# Patient Record
Sex: MALE | Race: WHITE | NOT HISPANIC OR LATINO | Employment: FULL TIME | URBAN - METROPOLITAN AREA
[De-identification: names, ages, dates, MRNs, and addresses within clinical notes are randomized per-mention and may not be internally consistent; named-entity substitution may affect disease eponyms.]

---

## 2017-01-24 ENCOUNTER — OFFICE VISIT (OUTPATIENT)
Dept: ENDOCRINOLOGY | Facility: CLINIC | Age: 57
End: 2017-01-24

## 2017-01-24 VITALS
WEIGHT: 267 LBS | HEIGHT: 72 IN | SYSTOLIC BLOOD PRESSURE: 138 MMHG | DIASTOLIC BLOOD PRESSURE: 90 MMHG | BODY MASS INDEX: 36.16 KG/M2

## 2017-01-24 DIAGNOSIS — E55.9 VITAMIN D DEFICIENCY: ICD-10-CM

## 2017-01-24 DIAGNOSIS — R73.9 HYPERGLYCEMIA: Primary | ICD-10-CM

## 2017-01-24 DIAGNOSIS — E78.2 MIXED HYPERLIPIDEMIA: ICD-10-CM

## 2017-01-24 DIAGNOSIS — I10 ESSENTIAL HYPERTENSION: ICD-10-CM

## 2017-01-24 DIAGNOSIS — M54.41 ACUTE MIDLINE LOW BACK PAIN WITH RIGHT-SIDED SCIATICA: ICD-10-CM

## 2017-01-24 DIAGNOSIS — R07.2 PRECORDIAL PAIN: ICD-10-CM

## 2017-01-24 LAB
ALBUMIN SERPL-MCNC: 4.6 G/DL (ref 3.2–4.8)
ALBUMIN/GLOB SERPL: 1.6 G/DL (ref 1.5–2.5)
ALP SERPL-CCNC: 54 U/L (ref 25–100)
ALT SERPL W P-5'-P-CCNC: 39 U/L (ref 7–40)
ANION GAP SERPL CALCULATED.3IONS-SCNC: 13 MMOL/L (ref 3–11)
ARTICHOKE IGE QN: 110 MG/DL (ref 0–130)
AST SERPL-CCNC: 34 U/L (ref 0–33)
BILIRUB SERPL-MCNC: 0.6 MG/DL (ref 0.3–1.2)
BUN BLD-MCNC: 15 MG/DL (ref 9–23)
BUN/CREAT SERPL: 18.8 (ref 7–25)
CALCIUM SPEC-SCNC: 10.3 MG/DL (ref 8.7–10.4)
CHLORIDE SERPL-SCNC: 103 MMOL/L (ref 99–109)
CHOLEST SERPL-MCNC: 193 MG/DL (ref 0–200)
CO2 SERPL-SCNC: 27 MMOL/L (ref 20–31)
CREAT BLD-MCNC: 0.8 MG/DL (ref 0.6–1.3)
GFR SERPL CREATININE-BSD FRML MDRD: 101 ML/MIN/1.73
GLOBULIN UR ELPH-MCNC: 2.9 GM/DL
GLUCOSE BLD-MCNC: 123 MG/DL (ref 70–100)
HBA1C MFR BLD: 6.4 %
HDLC SERPL-MCNC: 44 MG/DL (ref 40–60)
POTASSIUM BLD-SCNC: 4.4 MMOL/L (ref 3.5–5.5)
PROT SERPL-MCNC: 7.5 G/DL (ref 5.7–8.2)
SODIUM BLD-SCNC: 143 MMOL/L (ref 132–146)
T4 FREE SERPL-MCNC: 1.18 NG/DL (ref 0.89–1.76)
TRIGL SERPL-MCNC: 233 MG/DL (ref 0–150)
TSH SERPL DL<=0.05 MIU/L-ACNC: 2.07 MIU/ML (ref 0.35–5.35)

## 2017-01-24 PROCEDURE — 80053 COMPREHEN METABOLIC PANEL: CPT | Performed by: INTERNAL MEDICINE

## 2017-01-24 PROCEDURE — 84443 ASSAY THYROID STIM HORMONE: CPT | Performed by: INTERNAL MEDICINE

## 2017-01-24 PROCEDURE — 83036 HEMOGLOBIN GLYCOSYLATED A1C: CPT | Performed by: INTERNAL MEDICINE

## 2017-01-24 PROCEDURE — 80061 LIPID PANEL: CPT | Performed by: INTERNAL MEDICINE

## 2017-01-24 PROCEDURE — 99214 OFFICE O/P EST MOD 30 MIN: CPT | Performed by: INTERNAL MEDICINE

## 2017-01-24 PROCEDURE — 84439 ASSAY OF FREE THYROXINE: CPT | Performed by: INTERNAL MEDICINE

## 2017-01-24 RX ORDER — CARVEDILOL 12.5 MG/1
12.5 TABLET ORAL 2 TIMES DAILY WITH MEALS
Qty: 180 TABLET | Refills: 1 | Status: SHIPPED | OUTPATIENT
Start: 2017-01-24 | End: 2017-07-23 | Stop reason: SDUPTHER

## 2017-01-24 RX ORDER — ROSUVASTATIN CALCIUM 10 MG/1
1 TABLET, COATED ORAL DAILY
COMMUNITY
Start: 2016-11-25 | End: 2017-03-06 | Stop reason: SDUPTHER

## 2017-01-24 NOTE — MR AVS SNAPSHOT
Buzz Elmore   1/24/2017 10:30 AM   Office Visit    Dept Phone:  [phone]   Encounter #:  [id number]    Provider:  Maida Rey MD   Department:  [facility] INTERNAL MEDICINE AND ENDOCRINOLOGY                Your Full Care Plan              Today's Medication Changes          These changes are accurate as of: 1/24/17 10:53 AM.  If you have any questions, ask your nurse or doctor.               Medication(s)that have changed:     carvedilol 12.5 MG tablet   Commonly known as:  COREG   Take 1 tablet by mouth 2 (Two) Times a Day With Meals.   What changed:    - medication strength  - how much to take       rosuvastatin 10 MG tablet   Commonly known as:  CRESTOR   Take 1 tablet by mouth Daily.   What changed:  Another medication with the same name was removed. Continue taking this medication, and follow the directions you see here.            Where to Get Your Medications      [facility] [address] [facility]  [address] [facility] [phone] [facility] [phone] [place] [address]     Phone:  [phone]     carvedilol 12.5 MG tablet                  Your Updated Medication List          This list is accurate as of: 1/24/17 10:53 AM.  Always use your most recent med list.                ASPIRIN LOW DOSE 81 MG tablet   Generic drug:  aspirin       carvedilol 12.5 MG tablet   Commonly known as:  COREG   Take 1 tablet by mouth 2 (Two) Times a Day With Meals.       fenofibrate 48 MG tablet   Commonly known as:  TRICOR       losartan-hydrochlorothiazide 100-25 MG per tablet   Commonly known as:  HYZAAR   Take 1 tablet by mouth daily.       pantoprazole 40 MG EC tablet   Commonly known as:  PROTONIX   Take 1 tablet by mouth daily.       prasugrel 10 MG tablet   Commonly known as:  EFFIENT       predniSONE 10 MG tablet   Commonly known as:  DELTASONE       raNITIdine 75 MG tablet    Commonly known as:  ZANTAC       rosuvastatin 10 MG tablet   Commonly known as:  CRESTOR       Vitamin D 2000 UNITS capsule               We Performed the Following     Comprehensive Metabolic Panel     Lipid Panel     POC Glycosylated Hemoglobin (Hb A1C)     T4, Free     TSH       You Were Diagnosed With        Codes Comments    Hyperglycemia    -  Primary ICD-10-CM: R73.9  ICD-9-CM: 790.29     Mixed hyperlipidemia     ICD-10-CM: E78.2  ICD-9-CM: 272.2     Essential hypertension     ICD-10-CM: I10  ICD-9-CM: 401.9     Vitamin D deficiency     ICD-10-CM: E55.9  ICD-9-CM: 268.9     Acute midline low back pain with right-sided sciatica     ICD-10-CM: M54.41  ICD-9-CM: 724.2, 724.3     Precordial pain     ICD-10-CM: R07.2  ICD-9-CM: 786.51       Instructions     None    Patient Instructions History      Upcoming Appointments     Visit Type Date Time Department    OFFICE VISIT 2017 10:30 AM [facility]    OFFICE VISIT 2017 10:30 AM [facility]    FOLLOW UP 2017  9:15 AM [facility]      VCE Signup     [employer] allows you to send messages to your doctor, view your test results, renew your prescriptions, schedule appointments, and more. To sign up, go to [employer] and click on the Sign Up Now link in the New User? box. Enter your VCE Activation Code exactly as it appears below along with the last four digits of your Social Security Number and your Date of Birth () to complete the sign-up process. If you do not sign up before the expiration date, you must request a new code.    VCE Activation Code: [email or web address]  Expires: 2017 10:53 AM    If you have questions, you can email [email or web address] or call [phone] to talk to our VCE staff. Remember, VCE is NOT to be used for urgent needs. For medical emergencies, dial 911.               Other Info from Your Visit           Your Appointments     May 25, 2017 10:30 AM EDT  "  Office Visit with Maida Rey MD   [facility] INTERNAL MEDICINE AND ENDOCRINOLOGY (--)    [address]  [place] [address] [phone]           Arrive 15 minutes prior to appointment.            Sep 14, 2017  9:15 AM EDT   Follow Up with Db Kitchen MD   [facility] CARDIOLOGY (--)    [address] [place] [address] [phone]           Arrive 15 minutes prior to appointment.              Allergies     Atorvastatin  Myalgia      Reason for Visit     Follow-up Hypertension, hyperlipidemia, hyperglycemia, stress test      Vital Signs     Blood Pressure Height Weight Body Mass Index Smoking Status       138/90 72\" (182.9 cm) 267 lb (121 kg) 36.21 kg/m2 Never Smoker       Problems and Diagnoses Noted     Chest pain    Hyperglycemia    High cholesterol or triglycerides    High blood pressure    Low back pain    Vitamin D deficiency      Results     POC Glycosylated Hemoglobin (Hb A1C)      Component Value Standard Range & Units    Hemoglobin A1C 6.4 %                    "

## 2017-01-24 NOTE — PROGRESS NOTES
Buzz Elmore 56 y.o.  [place]: Follow-up (Hypertension, hyperlipidemia, hyperglycemia, stress test)    [place]: Follow-up (Hypertension, hyperlipidemia, hyperglycemia, stress test)    bp is high today   Has had headache for last 2 days  Weight gain 10 lbs over last 2 months  Is off work now and has had increased lower back pain - worse with walking   Radiates down right hip   Discussed PT - he will start with chiropracter and more walking   He will let us know if he wants PT order  Blood sugar and 90 day average sugar reviewed  Results for orders placed or performed in visit on 01/24/17   POC Glycosylated Hemoglobin (Hb A1C)   Result Value Ref Range    Hemoglobin A1C 6.4 %     Has increased significantly   Discussed goals with diet, need for him to avoid processed carbs  Whole food diet encouraged, with increased activity and goal of improved fitness, weight loss     Allergies   Allergen Reactions   • Atorvastatin Myalgia       Current Outpatient Prescriptions:   •  aspirin (ASPIRIN LOW DOSE) 81 MG tablet, Take  by mouth daily., Disp: , Rfl:   •  carvedilol (COREG) 6.25 MG tablet, Take 1 tablet by mouth 2 (two) times a day with meals., Disp: 180 tablet, Rfl: 3  •  Cholecalciferol (VITAMIN D) 2000 UNITS capsule, Take  by mouth., Disp: , Rfl:   •  fenofibrate (TRICOR) 48 MG tablet, Take 48 mg by mouth Daily., Disp: , Rfl:   •  losartan-hydrochlorothiazide (HYZAAR) 100-25 MG per tablet, Take 1 tablet by mouth daily., Disp: 90 tablet, Rfl: 3  •  pantoprazole (PROTONIX) 40 MG EC tablet, Take 1 tablet by mouth daily., Disp: 30 tablet, Rfl: 5  •  prasugrel (EFFIENT) 10 MG tablet, Take 10 mg by mouth Daily., Disp: , Rfl:   •  predniSONE (DELTASONE) 10 MG tablet, Take  by mouth., Disp: , Rfl:   •  ranitidine (ZANTAC) 75 MG tablet, Take 1 tablet by mouth 2 (two) times a day., Disp: , Rfl:   •  rosuvastatin (CRESTOR) 10 MG tablet, Take 1 tablet by mouth Daily., Disp: , Rfl:   Patient Active Problem List    Diagnosis   • IHD  (ischemic heart disease) [I25.9]     Overview Note:     1. Ischemic heart disease:  a. [facility] for ACS, 07/28/2011:  PTCA/PCI to proximal RCA  LVEF 60%;    b. [facility] Cardiolite stress test, 11/22/2011.  i. Patient walked 10:00 m/s to Stage IV of Brian protocol.  ii. Achieved 12.8 METS.  iii. Negative for ischemia or scar.  iv. LVEF 76%.       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.698A]   • Atherosclerosis of coronary artery [I25.10]     Overview Note:     Impression: 06/27/2014 - sees Dr Mcgraw this fall; Description: spenser 8/13     • Hyperglycemia [R73.9]     Overview Note:     Impression: 01/28/2016 - check hgn a1c;      • Gastroesophageal reflux disease [K21.9]   • Hyperlipidemia [E78.5]     Overview Note:     Impression: 01/28/2016 - check flp  Impression: 06/08/2015 - check flp  Impression: 12/08/2014 - check flp- is back on crestor without problems  Impression: 08/21/2014 - known cad- on and off statin therapy  will need rx at some point  Impression: 06/27/2014 - check flp;      • Hypertension [I10]     Overview Note:     Impression: 01/28/2016 - bp is good now, in cardio appt   continue to monitor  Impression: 06/08/2015 - bp is good  Impression: 12/08/2014 - bp is good  Impression: 06/27/2014 - bp is higher- continue current medications and add norvasc 5 mg daily to current regimen;      • Knee pain [M25.569]     Overview Note:     Impression: 12/08/2014 - better overall, continue to monitor  Impression: 06/27/2014 - getting supartz injections; Description: Emanuel 2/14 MRI  s/p joint injection- kenalog 2/14, synvisc 5/14  Emanuel- meniscus tear post op- doing well     • Lipoma [D17.9]   • Low back pain [M54.5]     Overview Note:     Impression: 08/21/2014 - stable to improving;      • Calculus of kidney [N20.0]   • Muscle pain [M79.1]     Overview Note:     Impression: 06/08/2015 - pain gone postop knee  Impression: 12/08/2014 - update - no muscle pain currently but does have joint pain  Impression: 08/21/2014 - update cmp and ck  on high dose atorvastatin and fenofibrate  d/c atorvastatin for now  has ov 12/14- can hold until then, then try cresor alone  f/u as scheduled;      • Vitamin D deficiency [E55.9]     Overview Note:     Impression: 06/08/2015 - update levels  Impression: 12/08/2014 - update levels;        Review of Systems   Constitutional: Positive for unexpected weight change. Negative for activity change, appetite change, chills, diaphoresis, fatigue and fever.   HENT: Negative for congestion, dental problem, drooling, ear discharge, ear pain, facial swelling, hearing loss, mouth sores, nosebleeds, postnasal drip, rhinorrhea, sinus pressure, sneezing, sore throat, tinnitus, trouble swallowing and voice change.    Eyes: Negative for photophobia, pain, discharge, redness, itching and visual disturbance.   Respiratory: Negative for apnea, cough, choking, chest tightness, shortness of breath, wheezing and stridor.    Cardiovascular: Negative for chest pain, palpitations and leg swelling.        Higher bp with recent normal stress test    Gastrointestinal: Negative for abdominal distention, abdominal pain, anal bleeding, blood in stool, constipation, diarrhea, nausea, rectal pain and vomiting.   Endocrine: Negative for cold intolerance, heat intolerance, polydipsia, polyphagia and polyuria.   Genitourinary: Negative for decreased urine volume, difficulty urinating, dysuria, enuresis, flank pain, frequency, genital sores, hematuria and urgency.   Musculoskeletal: Positive for back pain. Negative for arthralgias, gait problem,  "joint swelling, myalgias, neck pain and neck stiffness.   Skin: Negative for color change, pallor, rash and wound.   Allergic/Immunologic: Negative for environmental allergies, food allergies and immunocompromised state.   Neurological: Negative for dizziness, tremors, seizures, syncope, facial asymmetry, speech difficulty, weakness, light-headedness, numbness and headaches.   Hematological: Negative for adenopathy. Does not bruise/bleed easily.   Psychiatric/Behavioral: Negative for agitation, behavioral problems, confusion, decreased concentration, dysphoric mood, hallucinations, self-injury, sleep disturbance and suicidal ideas. The patient is not nervous/anxious and is not hyperactive.      Social History     Social History   • Marital status:      Spouse name: N/A   • Number of children: N/A   • Years of education: N/A     Occupational History   • Not on file.     Social History Main Topics   • Smoking status: Never Smoker   • Smokeless tobacco: Never Used   • Alcohol use No   • Drug use: No   • Sexual activity: Defer     Other Topics Concern   • Not on file     Social History Narrative     Family History   Problem Relation Age of Onset   • Breast cancer Other    • Colon cancer Other    • Heart disease Other    • Breast cancer Mother    • Heart disease Mother    • Colon cancer Father    • Heart disease Paternal Grandfather      Visit Vitals   • /90   • Ht 72\" (182.9 cm)   • Wt 267 lb (121 kg)   • BMI 36.21 kg/m2     Physical Exam   Constitutional: He is oriented to person, place, and time. He appears well-developed and well-nourished.   HENT:   Head: Normocephalic and atraumatic.   Nose: Nose normal.   Mouth/Throat: Oropharynx is clear and moist.   Eyes: Conjunctivae, EOM and lids are normal. Pupils are equal, round, and reactive to light.   Neck: Trachea normal and normal range of motion. Neck supple. Carotid bruit is not present. No tracheal deviation present. No thyroid mass and no thyromegaly " present.   Cardiovascular: Normal rate, regular rhythm, normal heart sounds and intact distal pulses.  Exam reveals no gallop and no friction rub.    No murmur heard.  Pulmonary/Chest: Effort normal and breath sounds normal. No respiratory distress. He has no wheezes.   Musculoskeletal: Normal range of motion. He exhibits no edema or deformity.   Lymphadenopathy:     He has no cervical adenopathy.   Neurological: He is alert and oriented to person, place, and time. He has normal reflexes. He displays normal reflexes. No cranial nerve deficit.   Skin: Skin is warm and dry. No rash noted. No cyanosis or erythema. Nails show no clubbing.   Psychiatric: He has a normal mood and affect. His speech is normal and behavior is normal. Judgment and thought content normal. Cognition and memory are normal.   Nursing note and vitals reviewed.    Results for orders placed or performed during the hospital encounter of 11/30/16   Stress test with myocardial perfusion   Result Value Ref Range    BH CV STRESS PROTOCOL 1 Brian     Stage 1 1     Duration Min Stage 1 3     Duration Sec Stage 1 0     Grade Stage 1 10     Speed Stage 1 1.7     BH CV STRESS METS STAGE 1 5     Baseline HR 66 bpm    Baseline /90 mmHg    Target HR (85%) 141 bpm    Max. Pred. HR (100%) 166 bpm    HR Stage 1 101     BP Stage 1 160/94     Stage 2 2     HR Stage 2 118     BP Stage 2 166/98     Duration Min Stage 2 3     Duration Sec Stage 2 0     Grade Stage 2 12     Speed Stage 2 2.5     BH CV STRESS METS STAGE 2 7.5     Stage 3 3     HR Stage 3 137     BP Stage 3 180/100     Duration Min Stage 3 3     Duration Sec Stage 3 0     Grade Stage 3 14     Speed Stage 3 3.4     BH CV STRESS METS STAGE 3 10.0     Stage 4 4     HR Stage 4 146     BP Stage 4 200/60     Duration Min Stage 4 1     Duration Sec Stage 4 20     Grade Stage 4 16     Speed Stage 4 4.2     BH CV STRESS METS STAGE 4 13.5     Peak /60 mmHg    Exercise duration (min) 10 min    Exercise  duration (sec) 20 sec    Estimated workload 10.8 METS    Peak  bpm    Percent Max Pred HR 87.95 %    Percent Target  %    Recovery  bpm    Recovery /80 mmHg    Nuc Stress EF 71 %     Problem List Items Addressed This Visit        Cardiovascular and Mediastinum    Hyperlipidemia     Is on statin therapy, known cad  Check flp and ast          Relevant Medications    rosuvastatin (CRESTOR) 10 MG tablet    Other Relevant Orders    TSH    T4, Free    Lipid Panel    Hypertension     High bp - increase coreg to 12.5 mg twice daily   Check bp at home and call if over 145/85            Relevant Medications    carvedilol (COREG) 12.5 MG tablet       Digestive    Vitamin D deficiency     On supplement  Check levels yearly             Nervous and Auditory    Chest pain     Recent neg stress test Dr Kitchen  Is 6 years out from stent          Low back pain     Due to inactivity and weight gain  Discussed cleaning up diet, work on weight loss  Discussed increase walking/ physical activity  Offered referral to PT but he would prefer to try chiropracter and above measures  F/u 3-4 month s            Other    Hyperglycemia - Primary     Blood sugar and 90 day average sugar discussed with him  Results for orders placed or performed in visit on 01/24/17   POC Glycosylated Hemoglobin (Hb A1C)   Result Value Ref Range    Hemoglobin A1C 6.4 %     Is near diabetic   See above plan for increased activity and weight loss  If not consider adding metformin          Relevant Orders    POC Glycosylated Hemoglobin (Hb A1C) (Completed)    Comprehensive Metabolic Panel        Return in about 4 months (around 5/24/2017) for Recheck 30 min .      Blanca Chavez MA

## 2017-03-07 RX ORDER — ROSUVASTATIN CALCIUM 10 MG/1
TABLET, COATED ORAL
Qty: 90 TABLET | Refills: 4 | Status: SHIPPED | OUTPATIENT
Start: 2017-03-07 | End: 2018-03-09 | Stop reason: SDUPTHER

## 2017-03-20 RX ORDER — PANTOPRAZOLE SODIUM 40 MG/1
TABLET, DELAYED RELEASE ORAL
Qty: 30 TABLET | Refills: 5 | Status: SHIPPED | OUTPATIENT
Start: 2017-03-20 | End: 2017-05-17 | Stop reason: SDUPTHER

## 2017-03-21 RX ORDER — FENOFIBRATE 48 MG/1
TABLET, COATED ORAL
Qty: 30 TABLET | Refills: 5 | Status: SHIPPED | OUTPATIENT
Start: 2017-03-21 | End: 2017-09-14 | Stop reason: SDUPTHER

## 2017-05-19 RX ORDER — PANTOPRAZOLE SODIUM 40 MG/1
TABLET, DELAYED RELEASE ORAL
Qty: 30 TABLET | Refills: 5 | Status: SHIPPED | OUTPATIENT
Start: 2017-05-19 | End: 2017-10-30 | Stop reason: SDUPTHER

## 2017-07-24 RX ORDER — CARVEDILOL 12.5 MG/1
TABLET ORAL
Qty: 180 TABLET | Refills: 0 | Status: SHIPPED | OUTPATIENT
Start: 2017-07-24 | End: 2017-10-19 | Stop reason: SDUPTHER

## 2017-09-14 RX ORDER — FENOFIBRATE 48 MG/1
TABLET, COATED ORAL
Qty: 30 TABLET | Refills: 6 | Status: SHIPPED | OUTPATIENT
Start: 2017-09-14 | End: 2018-04-16 | Stop reason: SDUPTHER

## 2017-10-04 RX ORDER — PRASUGREL HCL 10 MG
TABLET ORAL
Qty: 30 TABLET | Refills: 0 | Status: SHIPPED | OUTPATIENT
Start: 2017-10-04 | End: 2017-11-02 | Stop reason: SDUPTHER

## 2017-10-19 RX ORDER — CARVEDILOL 12.5 MG/1
TABLET ORAL
Qty: 30 TABLET | Refills: 0 | Status: SHIPPED | OUTPATIENT
Start: 2017-10-19 | End: 2017-11-04 | Stop reason: SDUPTHER

## 2017-10-30 ENCOUNTER — OFFICE VISIT (OUTPATIENT)
Dept: ENDOCRINOLOGY | Facility: CLINIC | Age: 57
End: 2017-10-30

## 2017-10-30 ENCOUNTER — OFFICE VISIT (OUTPATIENT)
Dept: CARDIOLOGY | Facility: CLINIC | Age: 57
End: 2017-10-30

## 2017-10-30 VITALS
OXYGEN SATURATION: 98 % | SYSTOLIC BLOOD PRESSURE: 144 MMHG | BODY MASS INDEX: 34.43 KG/M2 | DIASTOLIC BLOOD PRESSURE: 96 MMHG | HEIGHT: 73 IN | WEIGHT: 259.8 LBS | HEART RATE: 63 BPM

## 2017-10-30 VITALS
HEART RATE: 64 BPM | DIASTOLIC BLOOD PRESSURE: 72 MMHG | OXYGEN SATURATION: 98 % | BODY MASS INDEX: 34.59 KG/M2 | SYSTOLIC BLOOD PRESSURE: 124 MMHG | WEIGHT: 261 LBS | HEIGHT: 73 IN

## 2017-10-30 DIAGNOSIS — I10 ESSENTIAL HYPERTENSION: ICD-10-CM

## 2017-10-30 DIAGNOSIS — I25.118 ATHEROSCLEROSIS OF NATIVE CORONARY ARTERY OF NATIVE HEART WITH OTHER FORM OF ANGINA PECTORIS (HCC): ICD-10-CM

## 2017-10-30 DIAGNOSIS — E55.9 VITAMIN D DEFICIENCY: ICD-10-CM

## 2017-10-30 DIAGNOSIS — I25.9 IHD (ISCHEMIC HEART DISEASE): Primary | ICD-10-CM

## 2017-10-30 DIAGNOSIS — E78.2 MIXED HYPERLIPIDEMIA: ICD-10-CM

## 2017-10-30 DIAGNOSIS — R73.9 HYPERGLYCEMIA: Primary | ICD-10-CM

## 2017-10-30 LAB
25(OH)D3 SERPL-MCNC: 27.4 NG/ML
ALBUMIN SERPL-MCNC: 4.5 G/DL (ref 3.2–4.8)
ALBUMIN/GLOB SERPL: 1.7 G/DL (ref 1.5–2.5)
ALP SERPL-CCNC: 52 U/L (ref 25–100)
ALT SERPL W P-5'-P-CCNC: 44 U/L (ref 7–40)
ANION GAP SERPL CALCULATED.3IONS-SCNC: 6 MMOL/L (ref 3–11)
ARTICHOKE IGE QN: 109 MG/DL (ref 0–130)
AST SERPL-CCNC: 36 U/L (ref 0–33)
BILIRUB SERPL-MCNC: 0.8 MG/DL (ref 0.3–1.2)
BUN BLD-MCNC: 17 MG/DL (ref 9–23)
BUN/CREAT SERPL: 15.5 (ref 7–25)
CALCIUM SPEC-SCNC: 9.5 MG/DL (ref 8.7–10.4)
CHLORIDE SERPL-SCNC: 102 MMOL/L (ref 99–109)
CHOLEST SERPL-MCNC: 164 MG/DL (ref 0–200)
CO2 SERPL-SCNC: 28 MMOL/L (ref 20–31)
CREAT BLD-MCNC: 1.1 MG/DL (ref 0.6–1.3)
GFR SERPL CREATININE-BSD FRML MDRD: 69 ML/MIN/1.73
GLOBULIN UR ELPH-MCNC: 2.7 GM/DL
GLUCOSE BLD-MCNC: 86 MG/DL (ref 70–100)
GLUCOSE BLDC GLUCOMTR-MCNC: 80 MG/DL (ref 70–130)
HBA1C MFR BLD: 6.1 %
HDLC SERPL-MCNC: 44 MG/DL (ref 40–60)
POTASSIUM BLD-SCNC: 4.1 MMOL/L (ref 3.5–5.5)
PROT SERPL-MCNC: 7.2 G/DL (ref 5.7–8.2)
SODIUM BLD-SCNC: 136 MMOL/L (ref 132–146)
T4 FREE SERPL-MCNC: 1.24 NG/DL (ref 0.89–1.76)
TRIGL SERPL-MCNC: 167 MG/DL (ref 0–150)
TSH SERPL DL<=0.05 MIU/L-ACNC: 1.65 MIU/ML (ref 0.35–5.35)

## 2017-10-30 PROCEDURE — 82306 VITAMIN D 25 HYDROXY: CPT | Performed by: INTERNAL MEDICINE

## 2017-10-30 PROCEDURE — 84443 ASSAY THYROID STIM HORMONE: CPT | Performed by: INTERNAL MEDICINE

## 2017-10-30 PROCEDURE — 80061 LIPID PANEL: CPT | Performed by: INTERNAL MEDICINE

## 2017-10-30 PROCEDURE — 99213 OFFICE O/P EST LOW 20 MIN: CPT | Performed by: INTERNAL MEDICINE

## 2017-10-30 PROCEDURE — 83036 HEMOGLOBIN GLYCOSYLATED A1C: CPT | Performed by: INTERNAL MEDICINE

## 2017-10-30 PROCEDURE — 99214 OFFICE O/P EST MOD 30 MIN: CPT | Performed by: INTERNAL MEDICINE

## 2017-10-30 PROCEDURE — 82947 ASSAY GLUCOSE BLOOD QUANT: CPT | Performed by: INTERNAL MEDICINE

## 2017-10-30 PROCEDURE — 84439 ASSAY OF FREE THYROXINE: CPT | Performed by: INTERNAL MEDICINE

## 2017-10-30 PROCEDURE — 80053 COMPREHEN METABOLIC PANEL: CPT | Performed by: INTERNAL MEDICINE

## 2017-10-30 RX ORDER — FLUOROURACIL 50 MG/G
CREAM TOPICAL AS NEEDED
COMMUNITY
Start: 2017-08-29 | End: 2019-04-08

## 2017-10-30 RX ORDER — PANTOPRAZOLE SODIUM 40 MG/1
TABLET, DELAYED RELEASE ORAL
Qty: 90 TABLET | Refills: 3 | Status: SHIPPED | OUTPATIENT
Start: 2017-10-30 | End: 2018-11-05 | Stop reason: SDUPTHER

## 2017-10-30 RX ORDER — TRIAMCINOLONE ACETONIDE 1 MG/G
CREAM TOPICAL AS NEEDED
COMMUNITY
Start: 2017-08-29 | End: 2019-04-08

## 2017-10-30 NOTE — PROGRESS NOTES
Subjective:     Encounter Date:10/30/2017      Patient ID: Buzz Elmore is a 57 y.o. male.    Chief Complaint: ischemic heart disease and Hypertension    PROBLEM LIST:  1. Ischemic heart disease  a. [facility] for ACS, 07/28/2011: PTCA/PCI to  proximal RCA  LVEF 60%.  2. Coronary artery disease  a. Stress Test, 12/02/2016: EF > 70%. No ischemia  3. Hyperlipidemia  4. Hypertension  5. Chronic low back pain  6. GERD  7. Obesity    History of Present Illness  Buzz Elmore returns today for a 12 month follow up with a history of coronary artery disease among other cardiac risk factors. Since last visit he has been doing well from a cardiovascular standpoint. He has been stable over the past year, and presents today with no new medical problems. Denies any exertional chest pain, shortness of breath, orthopnea, PND, or palpitations. 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     Allergies   Allergen Reactions   • Atorvastatin Myalgia       Current Outpatient Prescriptions:   •  aspirin (ASPIRIN LOW DOSE) 81 MG tablet, Take  by mouth daily., Disp: , Rfl:   •  carvedilol (COREG) 12.5 MG tablet, TAKE ONE TABLET BY MOUTH TWICE A DAY WITH MEALS, Disp: 30 tablet, Rfl: 0  •  Cholecalciferol (VITAMIN D) 2000 UNITS capsule, Take  by mouth Daily., Disp: , Rfl:   •  EFFIENT 10 MG tablet, TAKE ONE TABLET BY MOUTH DAILY, Disp: 30 tablet, Rfl: 0  •  fenofibrate (TRICOR) 48 MG tablet, TAKE ONE TABLET BY MOUTH DAILY, Disp: 30 tablet, Rfl: 6  •  fluorouracil (EFUDEX) 5 % cream, As Needed., Disp: , Rfl:   •  losartan-hydrochlorothiazide (HYZAAR) 100-25 MG per tablet, Take 1 tablet by mouth daily., Disp: 90 tablet, Rfl: 3  •  pantoprazole (PROTONIX) 40 MG EC tablet, Take 1 tablet by mouth once daily., Disp: 30 tablet, Rfl: 5  •  rosuvastatin (CRESTOR) 10 MG tablet, TAKE ONE TABLET BY MOUTH DAILY, Disp: 90 tablet, Rfl: 4  •  triamcinolone (KENALOG) 0.1 % cream, As Needed., Disp: , Rfl:     The following portions of the  "patient's history were reviewed and updated as appropriate: allergies, current medications, past family history, past medical history, past social history, past surgical history and problem list.    Review of Systems   Constitution: Negative.   Cardiovascular: Negative.    Respiratory: Negative.    Hematologic/Lymphatic: Negative for bleeding problem. Does not bruise/bleed easily.   Skin: Negative for rash.   Musculoskeletal: Positive for joint pain. Negative for muscle weakness and myalgias.   Gastrointestinal: Negative for heartburn, nausea and vomiting.   Neurological: Negative.         Objective:     Vitals:    10/30/17 1117   BP: 144/96   BP Location: Right arm   Patient Position: Sitting   Pulse: 63   SpO2: 98%   Weight: 259 lb 12.8 oz (118 kg)   Height: 73\" (185.4 cm)       Physical Exam   Constitutional: He is oriented to person, place, and time. He appears well-developed and well-nourished.   HENT:   Mouth/Throat: Oropharynx is clear and moist.   Neck: No JVD present. Carotid bruit is not present. No thyromegaly present.   Cardiovascular: Regular rhythm, S1 normal, S2 normal, normal heart sounds and intact distal pulses.  Exam reveals no gallop, no S3 and no S4.    No murmur heard.  Pulses:       Carotid pulses are 2+ on the right side, and 2+ on the left side.       Radial pulses are 2+ on the right side, and 2+ on the left side.   Pulmonary/Chest: Breath sounds normal.   Abdominal: Soft. Bowel sounds are normal. He exhibits no mass. There is no tenderness.   Musculoskeletal: He exhibits no edema.   Neurological: He is alert and oriented to person, place, and time.   Skin: Skin is warm and dry. No rash noted.     Lab Review:    Procedures        Assessment:   Buzz was seen today for ischemic heart disease and hypertension.    Diagnoses and all orders for this visit:    IHD (ischemic heart disease)    Mixed hyperlipidemia    Essential hypertension    Atherosclerosis of native coronary artery of native " heart with other form of angina pectoris      Impression:  1. Ischemic heart disease, stable without angina  2. Hyperlipidemia is controlled with statin therapy.  3. Hypertension is well-controlled.    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 Continue current medications.  3. Revisit in 12 MO, or sooner as needed.    Scribed for Db Kitchen MD by Jennifer Crowe. 10/30/2017  11:36 AM    I, Db Kitchen MD, personally performed the services described in this documentation as scribed by the above individual in my presence, and it is both accurate and complete      Please note that portions of this note may have been completed with a voice recognition program. Efforts were made to edit the dictations, but occasionally words are mistranscribed.

## 2017-10-30 NOTE — ASSESSMENT & PLAN NOTE
Blood sugar and 90 day average sugar reviewed   Results for orders placed or performed in visit on 10/30/17   POC Glycosylated Hemoglobin (Hb A1C)   Result Value Ref Range    Hemoglobin A1C 6.1 %   POC Glucose Fingerstick   Result Value Ref Range    Glucose 80 70 - 130 mg/dL     Discussed diet and exercise  Goals less than 5.7% discussed   Recommended he give up sugared drinks

## 2017-10-30 NOTE — PROGRESS NOTES
Buzz Elmore 57 y.o.  CC:Follow-up; Hypertension; Hyperlipidemia; and Hyperglycemia      [place]: Follow-up; Hypertension; Hyperlipidemia; and Hyperglycemia    Blood sugar and 90 day average sugar reviewed  Results for orders placed or performed in visit on 10/30/17   POC Glycosylated Hemoglobin (Hb A1C)   Result Value Ref Range    Hemoglobin A1C 6.1 %   POC Glucose Fingerstick   Result Value Ref Range    Glucose 80 70 - 130 mg/dL     Recent visit Dr Elisa davalos  Discussed diet with him - avoid sugared drinks   bp is good   Is following a low fat diet   In interim has seen derm  - ointment for peeling hands, also gave him effudex for ears      Allergies   Allergen Reactions   • Atorvastatin Myalgia       Current Outpatient Prescriptions:   •  aspirin (ASPIRIN LOW DOSE) 81 MG tablet, Take  by mouth daily., Disp: , Rfl:   •  carvedilol (COREG) 12.5 MG tablet, TAKE ONE TABLET BY MOUTH TWICE A DAY WITH MEALS, Disp: 30 tablet, Rfl: 0  •  Cholecalciferol (VITAMIN D) 2000 UNITS capsule, Take  by mouth Daily., Disp: , Rfl:   •  EFFIENT 10 MG tablet, TAKE ONE TABLET BY MOUTH DAILY, Disp: 30 tablet, Rfl: 0  •  fenofibrate (TRICOR) 48 MG tablet, TAKE ONE TABLET BY MOUTH DAILY, Disp: 30 tablet, Rfl: 6  •  losartan-hydrochlorothiazide (HYZAAR) 100-25 MG per tablet, Take 1 tablet by mouth daily., Disp: 90 tablet, Rfl: 3  •  pantoprazole (PROTONIX) 40 MG EC tablet, Take 1 tablet by mouth once daily., Disp: 30 tablet, Rfl: 5  •  rosuvastatin (CRESTOR) 10 MG tablet, TAKE ONE TABLET BY MOUTH DAILY, Disp: 90 tablet, Rfl: 4  •  triamcinolone (KENALOG) 0.1 % cream, As Needed., Disp: , Rfl:   •  fluorouracil (EFUDEX) 5 % cream, As Needed., Disp: , Rfl:   Patient Active Problem List    Diagnosis   • IHD (ischemic heart disease) [I25.9]     Overview Note:     1. Ischemic heart disease:  a. [facility] for ACS, 07/28/2011:  PTCA/PCI to proximal RCA  LVEF 60%;    b. GXT Cardiolite stress test, 11/22/2011.  i. Patient walked 10:00 m/s to Stage IV  of Brian protocol.  ii. Achieved 12.8 METS.  iii. Negative for ischemia or scar.  iv. LVEF 76%.       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]   • Atherosclerosis of coronary artery [I25.10]     Overview Note:     Impression: 06/27/2014 - sees Dr Mcgraw this fall; Description: [facility] 8/13     • Hyperglycemia [R73.9]     Overview Note:     Impression: 01/28/2016 - check hgn a1c;      • Gastroesophageal reflux disease [K21.9]   • Hyperlipidemia [E78.5]     Overview Note:     Impression: 01/28/2016 - check flp  Impression: 06/08/2015 - check flp  Impression: 12/08/2014 - check flp- is back on crestor without problems  Impression: 08/21/2014 - known cad- on and off statin therapy  will need rx at some point  Impression: 06/27/2014 - check flp;      • Hypertension [I10]     Overview Note:     Impression: 01/28/2016 - bp is good now, in cardio appt   continue to monitor  Impression: 06/08/2015 - bp is good  Impression: 12/08/2014 - bp is good  Impression: 06/27/2014 - bp is higher- continue current medications and add norvasc 5 mg daily to current regimen;      • Knee pain [M25.569]     Overview Note:     Impression: 12/08/2014 - better overall, continue to monitor  Impression: 06/27/2014 - getting supartz injections; Description: [facility] 2/14 MRI  s/p joint injection- kenalog 2/14, synvisc 5/14 [facility]- meniscus tear post op- doing well     • Lipoma [D17.9]   • Low back pain [M54.5]     Overview Note:     Impression: 08/21/2014 - stable to improving;      • Calculus of kidney [N20.0]   • Muscle pain [M79.1]     Overview Note:      Impression: 06/08/2015 - pain gone postop knee  Impression: 12/08/2014 - update - no muscle pain currently but does have joint pain  Impression: 08/21/2014 - update cmp and ck  on high dose atorvastatin and fenofibrate  d/c atorvastatin for now  has ov 12/14- can hold until then, then try cresor alone  f/u as scheduled;      • Vitamin D deficiency [E55.9]     Overview Note:     Impression: 06/08/2015 - update levels  Impression: 12/08/2014 - update levels;        Review of Systems   Constitutional: Negative for activity change, appetite change, chills, diaphoresis, fatigue, fever and unexpected weight change.   HENT: Negative for congestion, dental problem, drooling, ear discharge, ear pain, facial swelling, hearing loss, mouth sores, nosebleeds, postnasal drip, rhinorrhea, sinus pressure, sneezing, sore throat, tinnitus, trouble swallowing and voice change.    Eyes: Negative for photophobia, pain, discharge, redness, itching and visual disturbance.   Respiratory: Negative for apnea, cough, choking, chest tightness, shortness of breath, wheezing and stridor.    Cardiovascular: Negative for chest pain, palpitations and leg swelling.   Gastrointestinal: Negative for abdominal distention, abdominal pain, anal bleeding, blood in stool, constipation, diarrhea, nausea, rectal pain and vomiting.   Endocrine: Negative for cold intolerance, heat intolerance, polydipsia, polyphagia and polyuria.   Genitourinary: Negative for decreased urine volume, difficulty urinating, dysuria, enuresis, flank pain, frequency, genital sores, hematuria and urgency.   Musculoskeletal: Negative for arthralgias, back pain, gait problem, joint swelling, myalgias, neck pain and neck stiffness.   Skin: Positive for rash. Negative for color change, pallor and wound.   Allergic/Immunologic: Negative for environmental allergies, food allergies and immunocompromised state.   Neurological: Negative for dizziness, tremors, seizures, syncope, facial  "asymmetry, speech difficulty, weakness, light-headedness, numbness and headaches.   Hematological: Negative for adenopathy. Does not bruise/bleed easily.   Psychiatric/Behavioral: Negative for agitation, behavioral problems, confusion, decreased concentration, dysphoric mood, hallucinations, self-injury, sleep disturbance and suicidal ideas. The patient is not nervous/anxious and is not hyperactive.      Social History     Social History   • Marital status:      Spouse name: N/A   • Number of children: N/A   • Years of education: N/A     Occupational History   • Not on file.     Social History Main Topics   • Smoking status: Never Smoker   • Smokeless tobacco: Never Used   • Alcohol use No   • Drug use: No   • Sexual activity: Defer     Other Topics Concern   • Not on file     Social History Narrative     Family History   Problem Relation Age of Onset   • Breast cancer Other    • Colon cancer Other    • Heart disease Other    • Breast cancer Mother    • Heart disease Mother    • Colon cancer Father    • Heart disease Paternal Grandfather      /72  Pulse 64  Ht 73\" (185.4 cm)  Wt 261 lb (118 kg)  SpO2 98%  BMI 34.43 kg/m2  Physical Exam   Constitutional: He is oriented to person, place, and time. He appears well-developed and well-nourished.   HENT:   Head: Normocephalic and atraumatic.   Nose: Nose normal.   Mouth/Throat: Oropharynx is clear and moist.   Eyes: Conjunctivae, EOM and lids are normal. Pupils are equal, round, and reactive to light.   Neck: Trachea normal and normal range of motion. Neck supple. Carotid bruit is not present. No tracheal deviation present. No thyroid mass and no thyromegaly present.   Cardiovascular: Normal rate, regular rhythm, normal heart sounds and intact distal pulses.  Exam reveals no gallop and no friction rub.    No murmur heard.  Pulmonary/Chest: Effort normal and breath sounds normal. No respiratory distress. He has no wheezes.   Musculoskeletal: Normal range " of motion. He exhibits no edema or deformity.   Lymphadenopathy:     He has no cervical adenopathy.   Neurological: He is alert and oriented to person, place, and time. He has normal reflexes. He displays normal reflexes. No cranial nerve deficit.   Skin: Skin is warm and dry. No rash noted. No cyanosis or erythema. Nails show no clubbing.   Psychiatric: He has a normal mood and affect. His speech is normal and behavior is normal. Judgment and thought content normal. Cognition and memory are normal.   Nursing note and vitals reviewed.    Results for orders placed or performed in visit on 01/24/17   TSH   Result Value Ref Range    TSH 2.069 0.350 - 5.350 mIU/mL   T4, Free   Result Value Ref Range    Free T4 1.18 0.89 - 1.76 ng/dL   Comprehensive Metabolic Panel   Result Value Ref Range    Glucose 123 (H) 70 - 100 mg/dL    BUN 15 9 - 23 mg/dL    Creatinine 0.80 0.60 - 1.30 mg/dL    Sodium 143 132 - 146 mmol/L    Potassium 4.4 3.5 - 5.5 mmol/L    Chloride 103 99 - 109 mmol/L    CO2 27.0 20.0 - 31.0 mmol/L    Calcium 10.3 8.7 - 10.4 mg/dL    Total Protein 7.5 5.7 - 8.2 g/dL    Albumin 4.60 3.20 - 4.80 g/dL    ALT (SGPT) 39 7 - 40 U/L    AST (SGOT) 34 (H) 0 - 33 U/L    Alkaline Phosphatase 54 25 - 100 U/L    Total Bilirubin 0.6 0.3 - 1.2 mg/dL    eGFR Non African Amer 101 >60 mL/min/1.73    Globulin 2.9 gm/dL    A/G Ratio 1.6 1.5 - 2.5 g/dL    BUN/Creatinine Ratio 18.8 7.0 - 25.0    Anion Gap 13.0 (H) 3.0 - 11.0 mmol/L   Lipid Panel   Result Value Ref Range    Total Cholesterol 193 0 - 200 mg/dL    Triglycerides 233 (H) 0 - 150 mg/dL    HDL Cholesterol 44 40 - 60 mg/dL    LDL Cholesterol  110 0 - 130 mg/dL   POC Glycosylated Hemoglobin (Hb A1C)   Result Value Ref Range    Hemoglobin A1C 6.4 %     Buzz was seen today for follow-up, hypertension, hyperlipidemia and hyperglycemia.    Diagnoses and all orders for this visit:    Hyperglycemia  -     POC Glycosylated Hemoglobin (Hb A1C)  -     POC Glucose  Fingerstick      Problem List Items Addressed This Visit        Cardiovascular and Mediastinum    Hyperlipidemia     Check flp          Relevant Orders    TSH    T4, Free    Comprehensive Metabolic Panel    Lipid Panel    Vitamin D 25 Hydroxy    Hypertension     bp is good          Relevant Orders    TSH    T4, Free    Comprehensive Metabolic Panel    Lipid Panel    Vitamin D 25 Hydroxy       Digestive    Vitamin D deficiency     Update vitamin d         Relevant Orders    TSH    T4, Free    Comprehensive Metabolic Panel    Lipid Panel    Vitamin D 25 Hydroxy       Other    Hyperglycemia - Primary     Blood sugar and 90 day average sugar reviewed   Results for orders placed or performed in visit on 10/30/17   POC Glycosylated Hemoglobin (Hb A1C)   Result Value Ref Range    Hemoglobin A1C 6.1 %   POC Glucose Fingerstick   Result Value Ref Range    Glucose 80 70 - 130 mg/dL     Discussed diet and exercise  Goals less than 5.7% discussed   Recommended he give up sugared drinks          Relevant Orders    POC Glycosylated Hemoglobin (Hb A1C) (Completed)    POC Glucose Fingerstick (Completed)    TSH    T4, Free    Comprehensive Metabolic Panel    Lipid Panel    Vitamin D 25 Hydroxy        Eczema hands   Return in about 6 months (around 4/30/2018) for Recheck 30 min .    Mayte Herrera MA  Signed Maida Rey MD

## 2017-11-02 RX ORDER — PRASUGREL 10 MG/1
10 TABLET, FILM COATED ORAL DAILY
Qty: 90 TABLET | Refills: 3 | Status: SHIPPED | OUTPATIENT
Start: 2017-11-02 | End: 2018-10-25 | Stop reason: SDUPTHER

## 2017-11-06 RX ORDER — CARVEDILOL 12.5 MG/1
TABLET ORAL
Qty: 60 TABLET | Refills: 5 | Status: SHIPPED | OUTPATIENT
Start: 2017-11-06 | End: 2018-05-13 | Stop reason: SDUPTHER

## 2017-11-27 RX ORDER — LOSARTAN POTASSIUM AND HYDROCHLOROTHIAZIDE 25; 100 MG/1; MG/1
TABLET ORAL
Qty: 90 TABLET | Refills: 4 | Status: SHIPPED | OUTPATIENT
Start: 2017-11-27 | End: 2019-01-04 | Stop reason: SDUPTHER

## 2017-12-14 ENCOUNTER — TELEPHONE (OUTPATIENT)
Dept: INTERNAL MEDICINE | Facility: CLINIC | Age: 57
End: 2017-12-14

## 2017-12-15 RX ORDER — OSELTAMIVIR PHOSPHATE 75 MG/1
75 CAPSULE ORAL DAILY
Qty: 5 CAPSULE | Refills: 0 | Status: SHIPPED | OUTPATIENT
Start: 2017-12-15 | End: 2018-04-14

## 2018-03-09 ENCOUNTER — TELEPHONE (OUTPATIENT)
Dept: INTERNAL MEDICINE | Facility: CLINIC | Age: 58
End: 2018-03-09

## 2018-03-09 RX ORDER — ROSUVASTATIN CALCIUM 10 MG/1
TABLET, COATED ORAL
Qty: 90 TABLET | Refills: 3 | Status: SHIPPED | OUTPATIENT
Start: 2018-03-09 | End: 2018-12-03

## 2018-03-09 NOTE — TELEPHONE ENCOUNTER
PHARMACY CALLED REGARDING PATIENTS MEDICATIONS... THEY SAID THE FDA DOESN'T RECOMMEND THAT THE PATIENT IS ON CRESTOR AND FENOFIBRATE AT THE SAME TIME AND THE PHARMACY WANTS TO MAKE SURE IT IS OKAY WITH DR. LUQUE THAT THE PATIENT IS OKAY TO BE TAKING BOTH MEDICATIONS. PLEASE ADVISE AND GIVE PHARMACY A CALL. THANKS.

## 2018-03-09 NOTE — TELEPHONE ENCOUNTER
Yes, I am ok with this.  We will let him know warning signs of liver or muscle problems   He has been on both for a year without issue  Thanks, amarjit

## 2018-04-13 ENCOUNTER — TELEPHONE (OUTPATIENT)
Dept: INTERNAL MEDICINE | Facility: CLINIC | Age: 58
End: 2018-04-13

## 2018-04-13 NOTE — TELEPHONE ENCOUNTER
Gabrielle states pt has been having dysuria and some bleeding with urination.  States he is scheduled to come into tomorrow here but states this is the second occurrence and the first time he had a prostate infection and she thinks he may need a urologist  She was offered OV today with APRN however she states he is working and does not know what time he will be home so she will keep OV tomorrow  She was advised he will need to be seen here first and then referral to urology can be made if needed

## 2018-04-13 NOTE — TELEPHONE ENCOUNTER
PT'S WIFE WOULD LIKE TO KNOW A UROLOGIST THAT DR. LUQUE RECOMMENDS HER  GO SEE. PLEASE GIVE PT A CALL. THANKS.

## 2018-04-14 ENCOUNTER — OFFICE VISIT (OUTPATIENT)
Dept: INTERNAL MEDICINE | Facility: CLINIC | Age: 58
End: 2018-04-14

## 2018-04-14 VITALS
HEART RATE: 68 BPM | BODY MASS INDEX: 34.19 KG/M2 | SYSTOLIC BLOOD PRESSURE: 130 MMHG | HEIGHT: 73 IN | WEIGHT: 258 LBS | OXYGEN SATURATION: 98 % | DIASTOLIC BLOOD PRESSURE: 78 MMHG

## 2018-04-14 DIAGNOSIS — R30.9 PAINFUL URINATION: ICD-10-CM

## 2018-04-14 DIAGNOSIS — R30.0 DYSURIA: Primary | ICD-10-CM

## 2018-04-14 DIAGNOSIS — Z87.438 HISTORY OF PROSTATITIS: ICD-10-CM

## 2018-04-14 PROCEDURE — 87086 URINE CULTURE/COLONY COUNT: CPT | Performed by: NURSE PRACTITIONER

## 2018-04-14 PROCEDURE — 99213 OFFICE O/P EST LOW 20 MIN: CPT | Performed by: NURSE PRACTITIONER

## 2018-04-14 PROCEDURE — 81003 URINALYSIS AUTO W/O SCOPE: CPT | Performed by: NURSE PRACTITIONER

## 2018-04-14 RX ORDER — PHENAZOPYRIDINE HYDROCHLORIDE 200 MG/1
200 TABLET, FILM COATED ORAL 3 TIMES DAILY PRN
Qty: 6 TABLET | Refills: 0 | Status: SHIPPED | OUTPATIENT
Start: 2018-04-14 | End: 2018-04-16

## 2018-04-14 NOTE — PROGRESS NOTES
"CHIEF COMPLAINT  Chief Complaint   Patient presents with   • Urinary Tract Infection     burning with urinationg.little blood as well. pt states that this has been going on for about a week.        HPI  Buzz Elmore is a 57 y.o. male  presents with complaint of 1 week burning with urination; has seen small spot of blood on underwear; denies frequency, urgency, flank pain, fever; hx of prostatitis--has not seen urology        Past Medical History:   Diagnosis Date   • Chronic low back pain    • GERD (gastroesophageal reflux disease)    • Hyperlipidemia    • Hypertension    • IHD (ischemic heart disease)    • Obesity        Family History   Problem Relation Age of Onset   • Breast cancer Other    • Colon cancer Other    • Heart disease Other    • Breast cancer Mother    • Heart disease Mother    • Colon cancer Father    • Heart disease Paternal Grandfather        Social History     Social History   • Marital status:      Spouse name: N/A   • Number of children: N/A   • Years of education: N/A     Occupational History   • Not on file.     Social History Main Topics   • Smoking status: Never Smoker   • Smokeless tobacco: Never Used   • Alcohol use No   • Drug use: No   • Sexual activity: Defer     Other Topics Concern   • Not on file     Social History Narrative   • No narrative on file       ROS  Review of Systems   Constitutional: Negative for activity change, appetite change and fever.   Genitourinary: Positive for dysuria. Negative for decreased urine volume, discharge, flank pain, frequency, hematuria, scrotal swelling, testicular pain and urgency.   All other systems reviewed and are negative.      /78   Pulse 68   Ht 185.4 cm (73\")   Wt 117 kg (258 lb)   SpO2 98%   BMI 34.04 kg/m²     PHYSICAL EXAM  Physical Exam   Constitutional: He is oriented to person, place, and time. He appears well-developed and well-nourished.   HENT:   Head: Normocephalic and atraumatic.   Eyes: Conjunctivae are " normal.   Neck: Normal range of motion. Neck supple.   Cardiovascular: Normal rate, regular rhythm and normal heart sounds.    No murmur heard.  Pulmonary/Chest: Effort normal and breath sounds normal.   Abdominal: Soft. Normal appearance and bowel sounds are normal. He exhibits no distension. There is no hepatosplenomegaly. There is tenderness in the left lower quadrant. There is no rigidity, no rebound, no guarding, no CVA tenderness, no tenderness at McBurney's point and negative Carrion's sign. No hernia.   Genitourinary:   Genitourinary Comments: Patient refused prostate exam--requests urology consult instead   Neurological: He is alert and oriented to person, place, and time.   Skin: Skin is warm, dry and intact.   Vitals reviewed.    Brief Urine Lab Results  (Last result in the past 365 days)      Color   Clarity   Blood   Leuk Est   Nitrite   Protein   CREAT   Urine HCG        04/22/18 1026 Yellow Clear Negative Negative Negative Negative                 ASSESSMENT/PLAN  1. Dysuria  -will notify when result of urine culture is back  - Ambulatory Referral to Urology    2. Painful urination  -referral to urology for further evaluation  - POCT urinalysis dipstick, automated  - Urine Culture - Urine, Urine, Clean Catch    3. History of prostatitis  -refused prostate exam  - Ambulatory Referral to Urology      FOLLOW-UP  Next scheduled appt with Dr. Rey    RTC sooner as needed.    Patient verbalizes understanding of medication dosage, comfort measures, instructions for treatment and follow-up.    Ama Borja, TYSON  04/14/2018

## 2018-04-16 LAB — BACTERIA SPEC AEROBE CULT: NORMAL

## 2018-04-16 RX ORDER — FENOFIBRATE 48 MG/1
TABLET, COATED ORAL
Qty: 30 TABLET | Refills: 5 | Status: SHIPPED | OUTPATIENT
Start: 2018-04-16 | End: 2018-10-16 | Stop reason: SDUPTHER

## 2018-04-22 LAB
BILIRUB BLD-MCNC: NEGATIVE MG/DL
CLARITY, POC: CLEAR
COLOR UR: YELLOW
GLUCOSE UR STRIP-MCNC: NEGATIVE MG/DL
KETONES UR QL: NEGATIVE
LEUKOCYTE EST, POC: NEGATIVE
NITRITE UR-MCNC: NEGATIVE MG/ML
PH UR: 6 [PH] (ref 5–8)
PROT UR STRIP-MCNC: NEGATIVE MG/DL
RBC # UR STRIP: NEGATIVE /UL
SP GR UR: 1.02 (ref 1–1.03)
UROBILINOGEN UR QL: NORMAL

## 2018-05-13 RX ORDER — CARVEDILOL 12.5 MG/1
TABLET ORAL
Qty: 60 TABLET | Refills: 4 | Status: SHIPPED | OUTPATIENT
Start: 2018-05-13 | End: 2018-11-20 | Stop reason: SDUPTHER

## 2018-10-16 RX ORDER — FENOFIBRATE 48 MG/1
TABLET, COATED ORAL
Qty: 30 TABLET | Refills: 4 | Status: SHIPPED | OUTPATIENT
Start: 2018-10-16 | End: 2018-12-03 | Stop reason: ALTCHOICE

## 2018-10-25 RX ORDER — PRASUGREL 10 MG/1
TABLET, FILM COATED ORAL
Qty: 90 TABLET | Refills: 1 | Status: SHIPPED | OUTPATIENT
Start: 2018-10-25 | End: 2019-04-22 | Stop reason: SDUPTHER

## 2018-11-05 RX ORDER — PANTOPRAZOLE SODIUM 40 MG/1
TABLET, DELAYED RELEASE ORAL
Qty: 90 TABLET | Refills: 0 | Status: SHIPPED | OUTPATIENT
Start: 2018-11-05 | End: 2019-02-15 | Stop reason: SDUPTHER

## 2018-11-20 RX ORDER — CARVEDILOL 12.5 MG/1
TABLET ORAL
Qty: 60 TABLET | Refills: 3 | Status: SHIPPED | OUTPATIENT
Start: 2018-11-20 | End: 2019-03-20 | Stop reason: SDUPTHER

## 2018-11-26 ENCOUNTER — OFFICE VISIT (OUTPATIENT)
Dept: ENDOCRINOLOGY | Facility: CLINIC | Age: 58
End: 2018-11-26

## 2018-11-26 VITALS
WEIGHT: 253.1 LBS | OXYGEN SATURATION: 99 % | HEIGHT: 73 IN | DIASTOLIC BLOOD PRESSURE: 80 MMHG | HEART RATE: 59 BPM | SYSTOLIC BLOOD PRESSURE: 122 MMHG | BODY MASS INDEX: 33.54 KG/M2

## 2018-11-26 DIAGNOSIS — E78.2 MIXED HYPERLIPIDEMIA: ICD-10-CM

## 2018-11-26 DIAGNOSIS — I10 ESSENTIAL HYPERTENSION: ICD-10-CM

## 2018-11-26 DIAGNOSIS — R73.9 HYPERGLYCEMIA: Primary | ICD-10-CM

## 2018-11-26 DIAGNOSIS — E55.9 VITAMIN D DEFICIENCY: ICD-10-CM

## 2018-11-26 LAB
ALBUMIN SERPL-MCNC: 4.51 G/DL (ref 3.2–4.8)
ALBUMIN/GLOB SERPL: 1.7 G/DL (ref 1.5–2.5)
ALP SERPL-CCNC: 47 U/L (ref 25–100)
ALT SERPL W P-5'-P-CCNC: 28 U/L (ref 7–40)
ANION GAP SERPL CALCULATED.3IONS-SCNC: 7 MMOL/L (ref 3–11)
ARTICHOKE IGE QN: 130 MG/DL (ref 0–130)
AST SERPL-CCNC: 27 U/L (ref 0–33)
BASOPHILS # BLD AUTO: 0.02 10*3/MM3 (ref 0–0.2)
BASOPHILS NFR BLD AUTO: 0.2 % (ref 0–1)
BILIRUB SERPL-MCNC: 0.6 MG/DL (ref 0.3–1.2)
BUN BLD-MCNC: 25 MG/DL (ref 9–23)
BUN/CREAT SERPL: 24.5 (ref 7–25)
CALCIUM SPEC-SCNC: 9.8 MG/DL (ref 8.7–10.4)
CHLORIDE SERPL-SCNC: 102 MMOL/L (ref 99–109)
CHOLEST SERPL-MCNC: 185 MG/DL (ref 0–200)
CO2 SERPL-SCNC: 30 MMOL/L (ref 20–31)
CREAT BLD-MCNC: 1.02 MG/DL (ref 0.6–1.3)
DEPRECATED RDW RBC AUTO: 46.3 FL (ref 37–54)
EOSINOPHIL # BLD AUTO: 0.12 10*3/MM3 (ref 0–0.3)
EOSINOPHIL NFR BLD AUTO: 1.4 % (ref 0–3)
ERYTHROCYTE [DISTWIDTH] IN BLOOD BY AUTOMATED COUNT: 14 % (ref 11.3–14.5)
GFR SERPL CREATININE-BSD FRML MDRD: 76 ML/MIN/1.73
GLOBULIN UR ELPH-MCNC: 2.6 GM/DL
GLUCOSE BLD-MCNC: 127 MG/DL (ref 70–100)
GLUCOSE BLDC GLUCOMTR-MCNC: 144 MG/DL (ref 70–130)
HBA1C MFR BLD: 6.2 %
HCT VFR BLD AUTO: 44.9 % (ref 38.9–50.9)
HDLC SERPL-MCNC: 48 MG/DL (ref 40–60)
HGB BLD-MCNC: 15.1 G/DL (ref 13.1–17.5)
IMM GRANULOCYTES # BLD: 0.02 10*3/MM3 (ref 0–0.03)
IMM GRANULOCYTES NFR BLD: 0.2 % (ref 0–0.6)
LYMPHOCYTES # BLD AUTO: 4.04 10*3/MM3 (ref 0.6–4.8)
LYMPHOCYTES NFR BLD AUTO: 46 % (ref 24–44)
MCH RBC QN AUTO: 31.4 PG (ref 27–31)
MCHC RBC AUTO-ENTMCNC: 33.6 G/DL (ref 32–36)
MCV RBC AUTO: 93.3 FL (ref 80–99)
MONOCYTES # BLD AUTO: 0.68 10*3/MM3 (ref 0–1)
MONOCYTES NFR BLD AUTO: 7.7 % (ref 0–12)
NEUTROPHILS # BLD AUTO: 3.92 10*3/MM3 (ref 1.5–8.3)
NEUTROPHILS NFR BLD AUTO: 44.7 % (ref 41–71)
PLATELET # BLD AUTO: 208 10*3/MM3 (ref 150–450)
PMV BLD AUTO: 10.6 FL (ref 6–12)
POTASSIUM BLD-SCNC: 4.3 MMOL/L (ref 3.5–5.5)
PROT SERPL-MCNC: 7.1 G/DL (ref 5.7–8.2)
RBC # BLD AUTO: 4.81 10*6/MM3 (ref 4.2–5.76)
SODIUM BLD-SCNC: 139 MMOL/L (ref 132–146)
T4 FREE SERPL-MCNC: 1.03 NG/DL (ref 0.89–1.76)
TRIGL SERPL-MCNC: 139 MG/DL (ref 0–150)
TSH SERPL DL<=0.05 MIU/L-ACNC: 2.46 MIU/ML (ref 0.35–5.35)
WBC NRBC COR # BLD: 8.78 10*3/MM3 (ref 3.5–10.8)

## 2018-11-26 PROCEDURE — 85025 COMPLETE CBC W/AUTO DIFF WBC: CPT | Performed by: INTERNAL MEDICINE

## 2018-11-26 PROCEDURE — 80053 COMPREHEN METABOLIC PANEL: CPT | Performed by: INTERNAL MEDICINE

## 2018-11-26 PROCEDURE — 84443 ASSAY THYROID STIM HORMONE: CPT | Performed by: INTERNAL MEDICINE

## 2018-11-26 PROCEDURE — 90471 IMMUNIZATION ADMIN: CPT | Performed by: INTERNAL MEDICINE

## 2018-11-26 PROCEDURE — 82962 GLUCOSE BLOOD TEST: CPT | Performed by: INTERNAL MEDICINE

## 2018-11-26 PROCEDURE — 84439 ASSAY OF FREE THYROXINE: CPT | Performed by: INTERNAL MEDICINE

## 2018-11-26 PROCEDURE — 82043 UR ALBUMIN QUANTITATIVE: CPT | Performed by: INTERNAL MEDICINE

## 2018-11-26 PROCEDURE — 80061 LIPID PANEL: CPT | Performed by: INTERNAL MEDICINE

## 2018-11-26 PROCEDURE — 99214 OFFICE O/P EST MOD 30 MIN: CPT | Performed by: INTERNAL MEDICINE

## 2018-11-26 PROCEDURE — 90472 IMMUNIZATION ADMIN EACH ADD: CPT | Performed by: INTERNAL MEDICINE

## 2018-11-26 PROCEDURE — 90632 HEPA VACCINE ADULT IM: CPT | Performed by: INTERNAL MEDICINE

## 2018-11-26 PROCEDURE — 82570 ASSAY OF URINE CREATININE: CPT | Performed by: INTERNAL MEDICINE

## 2018-11-26 PROCEDURE — 90674 CCIIV4 VAC NO PRSV 0.5 ML IM: CPT | Performed by: INTERNAL MEDICINE

## 2018-11-26 PROCEDURE — 83036 HEMOGLOBIN GLYCOSYLATED A1C: CPT | Performed by: INTERNAL MEDICINE

## 2018-11-26 RX ORDER — LORATADINE 10 MG/1
10 TABLET ORAL DAILY
COMMUNITY
End: 2018-11-26 | Stop reason: SDUPTHER

## 2018-11-26 RX ORDER — FLUTICASONE PROPIONATE 50 MCG
2 SPRAY, SUSPENSION (ML) NASAL DAILY
Qty: 3 BOTTLE | Refills: 3 | Status: SHIPPED | OUTPATIENT
Start: 2018-11-26 | End: 2019-12-09 | Stop reason: SDUPTHER

## 2018-11-26 RX ORDER — FLUTICASONE PROPIONATE 50 MCG
2 SPRAY, SUSPENSION (ML) NASAL DAILY
COMMUNITY
End: 2018-11-26 | Stop reason: SDUPTHER

## 2018-11-26 RX ORDER — LORATADINE 10 MG/1
10 TABLET ORAL DAILY
Qty: 90 TABLET | Refills: 3 | Status: SHIPPED | OUTPATIENT
Start: 2018-11-26 | End: 2019-12-09 | Stop reason: SDUPTHER

## 2018-11-26 NOTE — PROGRESS NOTES
Buzz A Catarina 58 y.o.  CC:Hyperglycemia; Hypertension; and Hyperlipidemia        [place]: Hyperglycemia; Hypertension; and Hyperlipidemia    Blood sugar and 90 day average sugar reviewed  Results for orders placed or performed in visit on 11/26/18   POC Glycosylated Hemoglobin (Hb A1C)   Result Value Ref Range    Hemoglobin A1C 6.2 %   POC Glucose Fingerstick   Result Value Ref Range    Glucose 144 (A) 70 - 130 mg/dL     Average sugar is 120  Is on low fat diet  Has lost 5 lbs   Commended effort at weight loss  No longer drinking sweet tea  Working full time  Wife has put him on diet    Allergies   Allergen Reactions   • Atorvastatin Myalgia       Current Outpatient Medications:   •  aspirin (ASPIRIN LOW DOSE) 81 MG tablet, Take  by mouth daily., Disp: , Rfl:   •  carvedilol (COREG) 12.5 MG tablet, TAKE ONE TABLET BY MOUTH TWICE A DAY WITH MEALS, Disp: 60 tablet, Rfl: 3  •  Cholecalciferol (VITAMIN D) 2000 UNITS capsule, Take  by mouth Daily., Disp: , Rfl:   •  fenofibrate (TRICOR) 48 MG tablet, TAKE ONE TABLET BY MOUTH DAILY, Disp: 30 tablet, Rfl: 4  •  fluticasone (FLONASE) 50 MCG/ACT nasal spray, 2 sprays into the nostril(s) as directed by provider Daily., Disp: 3 bottle, Rfl: 3  •  loratadine (CLARITIN) 10 MG tablet, Take 1 tablet by mouth Daily., Disp: 90 tablet, Rfl: 3  •  losartan-hydrochlorothiazide (HYZAAR) 100-25 MG per tablet, TAKE ONE TABLET BY MOUTH DAILY, Disp: 90 tablet, Rfl: 4  •  pantoprazole (PROTONIX) 40 MG EC tablet, TAKE ONE TABLET BY MOUTH DAILY, Disp: 90 tablet, Rfl: 0  •  prasugrel (EFFIENT) 10 MG tablet, TAKE ONE TABLET BY MOUTH DAILY, Disp: 90 tablet, Rfl: 1  •  rosuvastatin (CRESTOR) 10 MG tablet, TAKE ONE TABLET BY MOUTH DAILY, Disp: 90 tablet, Rfl: 3  •  fluorouracil (EFUDEX) 5 % cream, As Needed., Disp: , Rfl:   •  triamcinolone (KENALOG) 0.1 % cream, As Needed., Disp: , Rfl:   Patient Active Problem List    Diagnosis   • IHD (ischemic heart disease) [I25.9]   • Obesity [E66.9]   • GERD  (gastroesophageal reflux disease) [K21.9]   • Chronic low back pain [M54.5, G89.29]   • Coagulation disorder (CMS/HCC) [D68.9]   • Benign prostatic hyperplasia with urinary obstruction [N40.1, N13.8]   • Chest pain [R07.9]   • Mechanical complication due to tissue graft [T85.698A]   • Atherosclerosis of coronary artery [I25.10]   • Hyperglycemia [R73.9]   • Gastroesophageal reflux disease [K21.9]   • Hyperlipidemia [E78.5]   • Hypertension [I10]   • Knee pain [M25.569]   • Lipoma [D17.9]   • Low back pain [M54.5]   • Calculus of kidney [N20.0]   • Muscle pain [M79.10]   • Vitamin D deficiency [E55.9]     Review of Systems   Constitutional: Negative for activity change, appetite change, chills, diaphoresis, fatigue, fever and unexpected weight change.   HENT: Negative for congestion, dental problem, drooling, ear discharge, ear pain, facial swelling, hearing loss, mouth sores, nosebleeds, postnasal drip, rhinorrhea, sinus pressure, sneezing, sore throat, tinnitus, trouble swallowing and voice change.    Eyes: Negative for photophobia, pain, discharge, redness, itching and visual disturbance.   Respiratory: Negative for apnea, cough, choking, chest tightness, shortness of breath, wheezing and stridor.    Cardiovascular: Negative for chest pain, palpitations and leg swelling.   Gastrointestinal: Negative for abdominal distention, abdominal pain, anal bleeding, blood in stool, constipation, diarrhea, nausea, rectal pain and vomiting.   Endocrine: Negative for cold intolerance, heat intolerance, polydipsia, polyphagia and polyuria.   Genitourinary: Negative for decreased urine volume, difficulty urinating, dysuria, enuresis, flank pain, frequency, genital sores, hematuria and urgency.   Musculoskeletal: Positive for arthralgias. Negative for back pain, gait problem, joint swelling, myalgias, neck pain and neck stiffness.   Skin: Negative for color change, pallor, rash and wound.   Allergic/Immunologic: Negative for  "environmental allergies, food allergies and immunocompromised state.   Neurological: Negative for dizziness, tremors, seizures, syncope, facial asymmetry, speech difficulty, weakness, light-headedness, numbness and headaches.   Hematological: Negative for adenopathy. Does not bruise/bleed easily.   Psychiatric/Behavioral: Negative for agitation, behavioral problems, confusion, decreased concentration, dysphoric mood, hallucinations, self-injury, sleep disturbance and suicidal ideas. The patient is not nervous/anxious and is not hyperactive.      Social History     Socioeconomic History   • Marital status:      Spouse name: Not on file   • Number of children: Not on file   • Years of education: Not on file   • Highest education level: Not on file   Social Needs   • Financial resource strain: Not on file   • Food insecurity - worry: Not on file   • Food insecurity - inability: Not on file   • Transportation needs - medical: Not on file   • Transportation needs - non-medical: Not on file   Occupational History   • Not on file   Tobacco Use   • Smoking status: Never Smoker   • Smokeless tobacco: Never Used   Substance and Sexual Activity   • Alcohol use: No   • Drug use: No   • Sexual activity: Defer   Other Topics Concern   • Not on file   Social History Narrative   • Not on file     Family History   Problem Relation Age of Onset   • Breast cancer Other    • Colon cancer Other    • Heart disease Other    • Breast cancer Mother    • Heart disease Mother    • Colon cancer Father    • Heart disease Paternal Grandfather      /80   Pulse 59   Ht 185.4 cm (73\")   Wt 115 kg (253 lb 1.6 oz)   SpO2 99%   BMI 33.39 kg/m²   Physical Exam   Constitutional: He is oriented to person, place, and time. He appears well-developed and well-nourished.   HENT:   Head: Normocephalic and atraumatic.   Nose: Nose normal.   Mouth/Throat: Oropharynx is clear and moist.   Eyes: Conjunctivae, EOM and lids are normal. Pupils are " equal, round, and reactive to light.   Neck: Trachea normal and normal range of motion. Neck supple. Carotid bruit is not present. No tracheal deviation present. No thyroid mass and no thyromegaly present.   Cardiovascular: Normal rate, regular rhythm, normal heart sounds and intact distal pulses. Exam reveals no gallop and no friction rub.   No murmur heard.  Pulmonary/Chest: Effort normal and breath sounds normal. No respiratory distress. He has no wheezes.   Musculoskeletal: Normal range of motion. He exhibits no edema or deformity.   Lymphadenopathy:     He has no cervical adenopathy.   Neurological: He is alert and oriented to person, place, and time. He has normal reflexes. He displays normal reflexes. No cranial nerve deficit.   Skin: Skin is warm and dry. No rash noted. No cyanosis or erythema. Nails show no clubbing.   Psychiatric: He has a normal mood and affect. His speech is normal and behavior is normal. Judgment and thought content normal. Cognition and memory are normal.   Nursing note and vitals reviewed.    Results for orders placed or performed in visit on 04/14/18   Urine Culture - Urine, Urine, Clean Catch   Result Value Ref Range    Urine Culture No growth at 2 days    POCT urinalysis dipstick, automated   Result Value Ref Range    Color Yellow Yellow, Straw, Dark Yellow, [place]    Clarity, UA Clear Clear    Glucose, UA Negative Negative, 1000 mg/dL (3+) mg/dL    Bilirubin Negative Negative    Ketones, UA Negative Negative    Specific Gravity  1.020 1.005 - 1.030    Blood, UA Negative Negative    pH, Urine 6.0 5.0 - 8.0    Protein, POC Negative Negative mg/dL    Urobilinogen, UA Normal Normal    Leukocytes Negative Negative    Nitrite, UA Negative Negative     Diagnoses and all orders for this visit:    Hyperglycemia  -     POC Glycosylated Hemoglobin (Hb A1C)  -     POC Glucose Fingerstick    Other orders  -     fluticasone (FLONASE) 50 MCG/ACT nasal spray; 2 sprays into the nostril(s) as  directed by provider Daily.  -     loratadine (CLARITIN) 10 MG tablet; Take 1 tablet by mouth Daily.      Problem List Items Addressed This Visit        Cardiovascular and Mediastinum    Hypertension     bp well controlled today- continue to monitor          Relevant Orders    CBC Auto Differential    Hyperlipidemia     Check flp          Relevant Orders    Lipid Panel    TSH    T4, Free       Digestive    Vitamin D deficiency     Continue supplement             Other    Hyperglycemia - Primary     Blood sugar and 90 day average sugar reviewed  Results for orders placed or performed in visit on 11/26/18   POC Glycosylated Hemoglobin (Hb A1C)   Result Value Ref Range    Hemoglobin A1C 6.2 %   POC Glucose Fingerstick   Result Value Ref Range    Glucose 144 (A) 70 - 130 mg/dL     Average sugar is 120  Is utd with eye exam  No neuropathy or foot lesion   Ur alb due today   F/u 4 months commended weight loss and he has goal to lose another 20 lbs  Has given up sweet drinks   Continue to watch diet, exercise  F/u 3-4 months          Relevant Orders    POC Glycosylated Hemoglobin (Hb A1C) (Completed)    POC Glucose Fingerstick (Completed)    Comprehensive Metabolic Panel    Microalbumin / Creatinine Urine Ratio - Urine, Clean Catch        Return in about 4 months (around 3/26/2019) for Recheck 30 min .  Hep a vaccine today- booster     Mayte Herrera MA  Signed Maida Rey MD

## 2018-11-26 NOTE — ASSESSMENT & PLAN NOTE
Blood sugar and 90 day average sugar reviewed  Results for orders placed or performed in visit on 11/26/18   POC Glycosylated Hemoglobin (Hb A1C)   Result Value Ref Range    Hemoglobin A1C 6.2 %   POC Glucose Fingerstick   Result Value Ref Range    Glucose 144 (A) 70 - 130 mg/dL     Average sugar is 120  Is utd with eye exam  No neuropathy or foot lesion   Ur alb due today   F/u 4 months commended weight loss and he has goal to lose another 20 lbs  Has given up sweet drinks   Continue to watch diet, exercise  F/u 3-4 months

## 2018-11-27 LAB
CREAT 24H UR-MCNC: 118.6 MG/DL
MICROALBUMIN UR-MCNC: <3 UG/ML
MICROALBUMIN/CREAT UR: <2.5 MG/G CREAT (ref 0–30)

## 2018-12-03 ENCOUNTER — OFFICE VISIT (OUTPATIENT)
Dept: CARDIOLOGY | Facility: CLINIC | Age: 58
End: 2018-12-03

## 2018-12-03 VITALS
HEART RATE: 58 BPM | SYSTOLIC BLOOD PRESSURE: 122 MMHG | DIASTOLIC BLOOD PRESSURE: 82 MMHG | BODY MASS INDEX: 33.66 KG/M2 | WEIGHT: 254 LBS | HEIGHT: 73 IN

## 2018-12-03 DIAGNOSIS — I25.9 IHD (ISCHEMIC HEART DISEASE): Primary | ICD-10-CM

## 2018-12-03 DIAGNOSIS — I10 ESSENTIAL HYPERTENSION: ICD-10-CM

## 2018-12-03 DIAGNOSIS — E78.2 MIXED HYPERLIPIDEMIA: ICD-10-CM

## 2018-12-03 PROCEDURE — 99213 OFFICE O/P EST LOW 20 MIN: CPT | Performed by: INTERNAL MEDICINE

## 2018-12-03 RX ORDER — ROSUVASTATIN CALCIUM 40 MG/1
40 TABLET, COATED ORAL DAILY
Qty: 90 TABLET | Refills: 3 | Status: SHIPPED | OUTPATIENT
Start: 2018-12-03 | End: 2019-12-10 | Stop reason: SDUPTHER

## 2018-12-03 NOTE — PROGRESS NOTES
Subjective:     Encounter Date:12/03/2018      Patient ID: Buzz Elmore is a 58 y.o. male.    Chief Complaint: IHD (ischemic heart disease) and Atherosclerosis of coronary artery      PROBLEM LIST:  1. Ischemic heart disease  a. [facility] for ACS, 07/28/2011: PTCA/PCI to proximal RCA. EF 60%.  b. Stress Test, 12/02/2016: EF > 70%. No ischemia  2. Hyperlipidemia  3. Hypertension  4. Chronic low back pain  5. GERD  6. Obesity    History of Present Illness  Buzz Elmore returns today for annual follow up with a history of ischemic heart disease and cardiac risk factors. Since last visit, he has been doing well overall from a cardiovascular standpoint. He reports he gained some weight last winter, but has lost 14 lbs. Denies any exertional chest pain, shortness of breath, edema, orthopnea, PND, or palpitations. He remains physically active without limitations.    Allergies   Allergen Reactions   • Atorvastatin Myalgia         Current Outpatient Medications:   •  aspirin (ASPIRIN LOW DOSE) 81 MG tablet, Take  by mouth daily., Disp: , Rfl:   •  carvedilol (COREG) 12.5 MG tablet, TAKE ONE TABLET BY MOUTH TWICE A DAY WITH MEALS, Disp: 60 tablet, Rfl: 3  •  Cholecalciferol (VITAMIN D) 2000 UNITS capsule, Take  by mouth Daily., Disp: , Rfl:   •  fenofibrate (TRICOR) 48 MG tablet, TAKE ONE TABLET BY MOUTH DAILY, Disp: 30 tablet, Rfl: 4  •  fluorouracil (EFUDEX) 5 % cream, As Needed., Disp: , Rfl:   •  fluticasone (FLONASE) 50 MCG/ACT nasal spray, 2 sprays into the nostril(s) as directed by provider Daily., Disp: 3 bottle, Rfl: 3  •  loratadine (CLARITIN) 10 MG tablet, Take 1 tablet by mouth Daily., Disp: 90 tablet, Rfl: 3  •  losartan-hydrochlorothiazide (HYZAAR) 100-25 MG per tablet, TAKE ONE TABLET BY MOUTH DAILY, Disp: 90 tablet, Rfl: 4  •  pantoprazole (PROTONIX) 40 MG EC tablet, TAKE ONE TABLET BY MOUTH DAILY, Disp: 90 tablet, Rfl: 0  •  prasugrel (EFFIENT) 10 MG tablet, TAKE ONE TABLET BY MOUTH DAILY, Disp: 90  "tablet, Rfl: 1  •  rosuvastatin (CRESTOR) 10 MG tablet, TAKE ONE TABLET BY MOUTH DAILY, Disp: 90 tablet, Rfl: 3  •  triamcinolone (KENALOG) 0.1 % cream, As Needed., Disp: , Rfl:     The following portions of the patient's history were reviewed and updated as appropriate: allergies, current medications, past family history, past medical history, past social history, past surgical history and problem list.    Review of Systems   Constitution: Negative.   Cardiovascular: Negative.    Respiratory: Negative.    Hematologic/Lymphatic: Negative for bleeding problem. Does not bruise/bleed easily.   Skin: Negative for rash.   Musculoskeletal: Negative for muscle weakness and myalgias.   Gastrointestinal: Negative for heartburn, nausea and vomiting.   Neurological: Negative.           Objective:     Vitals:    12/03/18 0910   BP: 122/82   BP Location: Right arm   Patient Position: Sitting   Pulse: 58   Weight: 115 kg (254 lb)   Height: 185.4 cm (73\")         Physical Exam   Constitutional: He is oriented to person, place, and time. He appears well-developed and well-nourished.   HENT:   Mouth/Throat: Oropharynx is clear and moist.   Neck: No JVD present. Carotid bruit is not present. No thyromegaly present.   Cardiovascular: Regular rhythm, S1 normal, S2 normal, normal heart sounds and intact distal pulses. Exam reveals no gallop, no S3 and no S4.   No murmur heard.  Pulses:       Carotid pulses are 2+ on the right side, and 2+ on the left side.       Radial pulses are 2+ on the right side, and 2+ on the left side.   Pulmonary/Chest: Breath sounds normal.   Abdominal: Soft. Bowel sounds are normal. He exhibits no mass. There is no tenderness.   Musculoskeletal: He exhibits no edema.   Neurological: He is alert and oriented to person, place, and time.   Skin: Skin is warm and dry. No rash noted.       Lab Review:  Lab Results   Component Value Date    GLUCOSE 127 (H) 11/26/2018    BUN 25 (H) 11/26/2018    CREATININE 1.02 " 11/26/2018    EGFRIFNONA 76 11/26/2018    EGFRIFAFRI 106 12/08/2014    BCR 24.5 11/26/2018    K 4.3 11/26/2018    CO2 30.0 11/26/2018    CALCIUM 9.8 11/26/2018    PROTENTOTREF 7.2 12/08/2014    ALBUMIN 4.51 11/26/2018    LABIL2 1.7 12/08/2014    AST 27 11/26/2018    ALT 28 11/26/2018     Lab Results   Component Value Date    CHOL 185 11/26/2018    CHLPL 194 01/28/2016    TRIG 139 11/26/2018    HDL 48 11/26/2018     11/26/2018     Lab Results   Component Value Date    WBC 8.78 11/26/2018    HGB 15.1 11/26/2018    HCT 44.9 11/26/2018    MCV 93.3 11/26/2018     11/26/2018     Lab Results   Component Value Date    TSH 2.464 11/26/2018     Procedures        Assessment:   Buzz was seen today for ihd (ischemic heart disease) and atherosclerosis of coronary artery.    Diagnoses and all orders for this visit:    IHD (ischemic heart disease)    Essential hypertension    Mixed hyperlipidemia        Impression  1. Ischemic heart disease, stable without angina.  2. Hyperlipidemia, not optimally control on rosuvastatin 10 mg and fenofibrate 48 mg.  1. LDL performed last week was 1:30.  HDL 48.  Triglycerides 149.  3. Hypertension, well-controlled.    Plan:  1. Discontinue fenofibrate, and increase rosuvastatin from 10 mg to 40 mg for better management of hyperlipidemia.  2. Continue all other current medications.  3. Revisit in 12 MO, or sooner as needed.    Scribed for Db Kitchen MD by Elsi Cassidy. 12/3/2018  9:35 AM    I, Db Kitchen MD, personally performed the services described in this documentation as scribed by the above individual in my presence, and it is both accurate and complete      Please note that portions of this note may have been completed with a voice recognition program. Efforts were made to edit the dictations, but occasionally words are mistranscribed.

## 2019-01-04 RX ORDER — LOSARTAN POTASSIUM AND HYDROCHLOROTHIAZIDE 25; 100 MG/1; MG/1
TABLET ORAL
Qty: 90 TABLET | Refills: 3 | Status: SHIPPED | OUTPATIENT
Start: 2019-01-04 | End: 2019-02-10

## 2019-02-08 ENCOUNTER — TELEPHONE (OUTPATIENT)
Dept: INTERNAL MEDICINE | Facility: CLINIC | Age: 59
End: 2019-02-08

## 2019-02-08 NOTE — TELEPHONE ENCOUNTER
PATIENT'S SPOUSE CALLED REGARDING RECEIVING A RECALL LETTER FROM THE PHARMACY REGARDING [facility]. SHE WOULD LIKE TO KNOW IF DR LUQUE WANTS TO CHANGE THIS TO ANOTHER MEDICATION.

## 2019-02-10 RX ORDER — TELMISARTAN AND HYDROCHLORTHIAZIDE 80; 25 MG/1; MG/1
1 TABLET ORAL DAILY
Qty: 30 TABLET | Refills: 11 | Status: SHIPPED | OUTPATIENT
Start: 2019-02-10 | End: 2019-12-12 | Stop reason: SDUPTHER

## 2019-02-16 RX ORDER — PANTOPRAZOLE SODIUM 40 MG/1
TABLET, DELAYED RELEASE ORAL
Qty: 90 TABLET | Refills: 0 | Status: SHIPPED | OUTPATIENT
Start: 2019-02-16 | End: 2019-06-17 | Stop reason: SDUPTHER

## 2019-02-22 ENCOUNTER — TELEPHONE (OUTPATIENT)
Dept: INTERNAL MEDICINE | Facility: CLINIC | Age: 59
End: 2019-02-22

## 2019-02-22 NOTE — TELEPHONE ENCOUNTER
PATIENT NEEDS A REFILL ON HIS PANTOPRAZOLE MEDICATION FOR A THREE MONTH SUPPLY AND HE WOULD LIKE FOR THIS TO BE CALLED INTO HIS LOCAL PHARM THAT IS ON FILE.

## 2019-02-22 NOTE — TELEPHONE ENCOUNTER
Patient was advised the rx refill was sent in but needs a PA sent in and it will be sent within the next week.  Pt has tried and failed prilosec and omeprazole  He was advised to try OTC nexium while we work on the PA  Pt voiced understanding

## 2019-03-20 RX ORDER — CARVEDILOL 12.5 MG/1
TABLET ORAL
Qty: 60 TABLET | Refills: 2 | Status: SHIPPED | OUTPATIENT
Start: 2019-03-20 | End: 2019-06-24 | Stop reason: SDUPTHER

## 2019-03-20 RX ORDER — FENOFIBRATE 48 MG/1
TABLET, COATED ORAL
Qty: 30 TABLET | Refills: 3 | OUTPATIENT
Start: 2019-03-20

## 2019-04-08 ENCOUNTER — OFFICE VISIT (OUTPATIENT)
Dept: ORTHOPEDIC SURGERY | Facility: CLINIC | Age: 59
End: 2019-04-08

## 2019-04-08 VITALS — HEIGHT: 73 IN | HEART RATE: 69 BPM | WEIGHT: 262.79 LBS | BODY MASS INDEX: 34.83 KG/M2 | OXYGEN SATURATION: 97 %

## 2019-04-08 DIAGNOSIS — M17.11 PRIMARY OSTEOARTHRITIS OF RIGHT KNEE: ICD-10-CM

## 2019-04-08 DIAGNOSIS — M17.12 PRIMARY OSTEOARTHRITIS OF LEFT KNEE: Primary | ICD-10-CM

## 2019-04-08 PROCEDURE — 20610 DRAIN/INJ JOINT/BURSA W/O US: CPT | Performed by: ORTHOPAEDIC SURGERY

## 2019-04-08 PROCEDURE — 99203 OFFICE O/P NEW LOW 30 MIN: CPT | Performed by: ORTHOPAEDIC SURGERY

## 2019-04-08 RX ORDER — TRIAMCINOLONE ACETONIDE 40 MG/ML
40 INJECTION, SUSPENSION INTRA-ARTICULAR; INTRAMUSCULAR
Status: COMPLETED | OUTPATIENT
Start: 2019-04-08 | End: 2019-04-08

## 2019-04-08 RX ORDER — ROPIVACAINE HYDROCHLORIDE 5 MG/ML
4 INJECTION, SOLUTION EPIDURAL; INFILTRATION; PERINEURAL
Status: COMPLETED | OUTPATIENT
Start: 2019-04-08 | End: 2019-04-08

## 2019-04-08 RX ORDER — FENOFIBRATE 48 MG/1
48 TABLET, COATED ORAL DAILY
COMMUNITY
End: 2019-08-08 | Stop reason: SDUPTHER

## 2019-04-08 RX ADMIN — ROPIVACAINE HYDROCHLORIDE 4 ML: 5 INJECTION, SOLUTION EPIDURAL; INFILTRATION; PERINEURAL at 15:49

## 2019-04-08 RX ADMIN — TRIAMCINOLONE ACETONIDE 40 MG: 40 INJECTION, SUSPENSION INTRA-ARTICULAR; INTRAMUSCULAR at 15:49

## 2019-04-08 NOTE — PROGRESS NOTES
Procedure   Large Joint Arthrocentesis: L knee  Date/Time: 4/8/2019 3:49 PM  Consent given by: patient  Site marked: site marked  Timeout: Immediately prior to procedure a time out was called to verify the correct patient, procedure, equipment, support staff and site/side marked as required   Supporting Documentation  Indications: pain   Procedure Details  Location: knee - L knee  Preparation: Patient was prepped and draped in the usual sterile fashion  Needle size: 22 G  Approach: anterolateral  Medications administered: 4 mL ropivacaine 0.5 %; 40 mg triamcinolone acetonide 40 MG/ML  Patient tolerance: patient tolerated the procedure well with no immediate complications

## 2019-04-08 NOTE — PROGRESS NOTES
[facility]    Subjective     Chief Complaint   Patient presents with   • Left Knee - Pain   • Right Knee - Pain        HPI    Buzz Elmore is a 58 y.o. male.  He presents today for evaluation of bilateral knee pain, left greater than right.  Pain is been present for a couple of months, following no particular injury.  Pain is worse with walking and standing.  Pain can be 9 out of 10 in the left knee, which is stabbing in quality.  The right knee is not bothering him significantly at this point.      Patient Active Problem List   Diagnosis   • Coagulation disorder (CMS/HCC)   • Benign prostatic hyperplasia with urinary obstruction   • Chest pain   • Mechanical complication due to tissue graft   • Atherosclerosis of coronary artery   • Hyperglycemia   • Gastroesophageal reflux disease   • Hyperlipidemia   • Hypertension   • Knee pain   • Lipoma   • Low back pain   • Calculus of kidney   • Muscle pain   • Vitamin D deficiency   • IHD (ischemic heart disease)   • Obesity   • GERD (gastroesophageal reflux disease)   • Chronic low back pain   • Acute serous otitis media   • Mixed conductive and sensorineural hearing loss   • Bilateral sensorineural hearing loss   • Otitis media of left ear     Past Medical History:   Diagnosis Date   • Chronic low back pain    • GERD (gastroesophageal reflux disease)    • Hyperlipidemia    • Hypertension    • IHD (ischemic heart disease)    • Obesity       Past Surgical History:   Procedure Laterality Date   • CORONARY STENT PLACEMENT     • HAND SURGERY      skin graft on finger   • KNEE ARTHROTOMY      History of Arthrotomy Of Knee With Medial Meniscectomy   • KNEE SURGERY Right     meniscus      Family History   Problem Relation Age of Onset   • Breast cancer Other    • Colon cancer Other    • Heart disease Other    • Breast cancer Mother    • Heart disease Mother    • Cancer Mother    • Hypertension Mother    • Heart attack Mother    • Colon cancer  Father    • Cancer Father    • Hypertension Father    • Heart attack Father    • Heart disease Paternal Grandfather      Social History     Socioeconomic History   • Marital status:      Spouse name: Not on file   • Number of children: Not on file   • Years of education: Not on file   • Highest education level: Not on file   Tobacco Use   • Smoking status: Never Smoker   • Smokeless tobacco: Never Used   Substance and Sexual Activity   • Alcohol use: No   • Drug use: No   • Sexual activity: Defer      Current Outpatient Medications on File Prior to Visit   Medication Sig Dispense Refill   • aspirin (ASPIRIN LOW DOSE) 81 MG tablet Take  by mouth daily.     • carvedilol (COREG) 12.5 MG tablet TAKE ONE TABLET BY MOUTH TWICE A DAY WITH MEALS 60 tablet 2   • Cholecalciferol (VITAMIN D) 2000 UNITS capsule Take  by mouth Daily.     • fenofibrate (TRICOR) 48 MG tablet Take 48 mg by mouth Daily.     • pantoprazole (PROTONIX) 40 MG EC tablet TAKE ONE TABLET BY MOUTH DAILY 90 tablet 0   • prasugrel (EFFIENT) 10 MG tablet TAKE ONE TABLET BY MOUTH DAILY 90 tablet 1   • rosuvastatin (CRESTOR) 40 MG tablet Take 1 tablet by mouth Daily. 90 tablet 3   • telmisartan-hydrochlorothiazide (MICARDIS HCT) 80-25 MG per tablet Take 1 tablet by mouth Daily. 30 tablet 11   • fluticasone (FLONASE) 50 MCG/ACT nasal spray 2 sprays into the nostril(s) as directed by provider Daily. 3 bottle 3   • loratadine (CLARITIN) 10 MG tablet Take 1 tablet by mouth Daily. 90 tablet 3   • [DISCONTINUED] fluorouracil (EFUDEX) 5 % cream As Needed.     • [DISCONTINUED] triamcinolone (KENALOG) 0.1 % cream As Needed.       No current facility-administered medications on file prior to visit.       Allergies   Allergen Reactions   • Atorvastatin Myalgia        Review of Systems   Constitutional: Negative for activity change, appetite change, chills, diaphoresis, fatigue, fever and unexpected weight change.   HENT: Negative for congestion, dental problem,  "drooling, ear discharge, ear pain, facial swelling, hearing loss, mouth sores, nosebleeds, postnasal drip, rhinorrhea, sinus pressure, sneezing, sore throat, tinnitus, trouble swallowing and voice change.    Eyes: Negative for photophobia, pain, discharge, redness, itching and visual disturbance.   Respiratory: Negative for apnea, cough, choking, chest tightness, shortness of breath, wheezing and stridor.    Cardiovascular: Negative for chest pain, palpitations and leg swelling.   Gastrointestinal: Negative for abdominal distention, abdominal pain, anal bleeding, blood in stool, constipation, diarrhea, nausea, rectal pain and vomiting.   Endocrine: Negative for cold intolerance, heat intolerance, polydipsia, polyphagia and polyuria.   Genitourinary: Negative for decreased urine volume, difficulty urinating, dysuria, enuresis, flank pain, frequency, genital sores, hematuria and urgency.   Musculoskeletal: Positive for arthralgias and joint swelling. Negative for back pain, gait problem, myalgias, neck pain and neck stiffness.   Skin: Negative for color change, pallor, rash and wound.   Allergic/Immunologic: Negative for environmental allergies, food allergies and immunocompromised state.   Neurological: Negative for dizziness, tremors, seizures, syncope, facial asymmetry, speech difficulty, weakness, light-headedness, numbness and headaches.   Hematological: Negative for adenopathy. Does not bruise/bleed easily.   Psychiatric/Behavioral: Negative for agitation, behavioral problems, confusion, decreased concentration, dysphoric mood, hallucinations, self-injury, sleep disturbance and suicidal ideas. The patient is not nervous/anxious and is not hyperactive.         Objective      Physical Exam  Pulse 69   Ht 185.4 cm (72.99\")   Wt 119 kg (262 lb 12.6 oz)   SpO2 97%   BMI 34.68 kg/m²     Body mass index is 34.68 kg/m².    General:   Mental Status:  Alert   Appearance: Cooperative, in no acute distress   Build and " Nutrition: Overweight male   Orientation: Alert and oriented to person, place and time   Posture: Normal   Gait: Normal    Integument:   Right knee: no skin lesions, no rash, no ecchymosis   Left knee: no skin lesions, no rash, no ecchymosis    Neurologic:   Sensation:    Right foot: intact to light touch on the dorsal and plantar aspect    Left foot: intact to light touch on the dorsal and plantar aspect   Motor:  Right lower extremity: 5/5 quadriceps, hamstrings, ankle dorsiflexors, and ankle plantar flexors  Left lower extremity: 5/5 quadriceps, hamstrings, ankle dorsiflexors, and ankle plantar flexors  Vascular:   Right lower extremity: 2+ dorsalis pedis pulse, prompt capillary refill   Left lower extremity: 2+ dorsalis pedis pulse, prompt capillary refill    Lower Extremities:   Right Knee:    Tenderness:  Medial joint line tenderness    Effusion:  None    Swelling:  None    Crepitus:  Positive    Atrophy:  None    Range of motion:  Extension: 0°       Flexion: 120°  Instability:  No varus laxity, no valgus laxity, negative anterior drawer  Deformities:  Varus   Left Knee:    Tenderness:  Medial joint line tenderness    Effusion:  None    Swelling:  None    Crepitus: Positive    Atrophy:  None    Range of motion:  Extension: 0°       Flexion: 120°  Instability:  No varus laxity, no valgus laxity, negative anterior drawer  Deformities:  Varus      Imaging/Studies      Imaging Results (last 24 hours)     Procedure Component Value Units Date/Time    XR Knee 4+ View Bilateral [[id number]] Resulted:  04/08/19 1528     Updated:  04/08/19 1529    Narrative:       Right Knee Radiographs  Indication: right knee pain  Views: Standing AP's and skiers of both knees, with lateral and sunrise   views of the right knee    Comparison: no prior studies available    Findings:   Medial joint space narrowing, with bone-on-bone contact in the medial   compartment, patellofemoral spurring, and mild varus alignment.      Left Knee  Radiographs  Indication: left knee pain  Views: Standing AP's and skiers of both knees, with lateral and sunrise   views of the left knee    Comparison: no prior studies available    Findings:   Bone-on-bone contact the medial compartment, patellofemoral degeneration,   no acute bony abnormalities, with and varus alignment.          Assessment and Plan     Buzz was seen today for pain and pain.    Diagnoses and all orders for this visit:    Primary osteoarthritis of left knee  -     XR Knee 4+ View Bilateral  -     Large Joint Arthrocentesis: L knee    Primary osteoarthritis of right knee  -     XR Knee 4+ View Bilateral        1. Primary osteoarthritis of left knee    2. Primary osteoarthritis of right knee        I reviewed my findings with patient today.  He has arthritis in both knees, but the left is much more symptomatic than the right.  He would like to have an injection in his left knee today.  I will see him back in 2 months, but sooner for any problems.  Of note, he has had good relief with Visco supplementation injections in the past.  Ultimately, he may be a candidate for knee replacement surgery.    Of note, he had 100% relief just a few minutes following the injection.    Also, he wanted to try an  brace, and this was provided.    Return in about 2 months (around 6/8/2019).      Medical Decision Making  Management Options : prescription/IM medicine  Data/Risk: radiology tests and independent visualization of imaging, lab tests, or EMG/NCV      Prince Castellanos MD  04/08/19  4:18 PM

## 2019-04-22 RX ORDER — PRASUGREL 10 MG/1
TABLET, FILM COATED ORAL
Qty: 90 TABLET | Refills: 3 | Status: SHIPPED | OUTPATIENT
Start: 2019-04-22 | End: 2020-04-27

## 2019-06-17 RX ORDER — PANTOPRAZOLE SODIUM 40 MG/1
TABLET, DELAYED RELEASE ORAL
Qty: 90 TABLET | Refills: 0 | Status: SHIPPED | OUTPATIENT
Start: 2019-06-17 | End: 2019-09-23 | Stop reason: SDUPTHER

## 2019-06-17 NOTE — TELEPHONE ENCOUNTER
PATIENT IS CALLING ALSO, HE IS REQUESTING A 90 SUPPLY.  PHARMACY IS [facility] IN [place], [place]. PT CAN  BE REACHED [phone]

## 2019-06-25 RX ORDER — CARVEDILOL 12.5 MG/1
TABLET ORAL
Qty: 60 TABLET | Refills: 1 | Status: SHIPPED | OUTPATIENT
Start: 2019-06-25 | End: 2019-08-29 | Stop reason: SDUPTHER

## 2019-08-08 RX ORDER — FENOFIBRATE 48 MG/1
48 TABLET, COATED ORAL DAILY
Qty: 90 TABLET | Refills: 0 | Status: SHIPPED | OUTPATIENT
Start: 2019-08-08 | End: 2019-11-05 | Stop reason: SDUPTHER

## 2019-08-29 RX ORDER — CARVEDILOL 12.5 MG/1
TABLET ORAL
Qty: 60 TABLET | Refills: 0 | Status: SHIPPED | OUTPATIENT
Start: 2019-08-29 | End: 2019-09-27 | Stop reason: SDUPTHER

## 2019-09-23 RX ORDER — PANTOPRAZOLE SODIUM 40 MG/1
TABLET, DELAYED RELEASE ORAL
Qty: 90 TABLET | Refills: 0 | Status: SHIPPED | OUTPATIENT
Start: 2019-09-23 | End: 2019-12-09 | Stop reason: SDUPTHER

## 2019-09-27 RX ORDER — CARVEDILOL 12.5 MG/1
TABLET ORAL
Qty: 60 TABLET | Refills: 0 | Status: SHIPPED | OUTPATIENT
Start: 2019-09-27 | End: 2019-10-26 | Stop reason: SDUPTHER

## 2019-10-07 ENCOUNTER — OFFICE VISIT (OUTPATIENT)
Dept: ORTHOPEDIC SURGERY | Facility: CLINIC | Age: 59
End: 2019-10-07

## 2019-10-07 VITALS — HEART RATE: 71 BPM | OXYGEN SATURATION: 98 % | HEIGHT: 73 IN | BODY MASS INDEX: 33.98 KG/M2 | WEIGHT: 256.39 LBS

## 2019-10-07 DIAGNOSIS — M17.12 PRIMARY OSTEOARTHRITIS OF LEFT KNEE: Primary | ICD-10-CM

## 2019-10-07 PROCEDURE — 20610 DRAIN/INJ JOINT/BURSA W/O US: CPT | Performed by: ORTHOPAEDIC SURGERY

## 2019-10-07 RX ORDER — TRIAMCINOLONE ACETONIDE 40 MG/ML
40 INJECTION, SUSPENSION INTRA-ARTICULAR; INTRAMUSCULAR
Status: COMPLETED | OUTPATIENT
Start: 2019-10-07 | End: 2019-10-07

## 2019-10-07 RX ORDER — ROPIVACAINE HYDROCHLORIDE 5 MG/ML
4 INJECTION, SOLUTION EPIDURAL; INFILTRATION; PERINEURAL
Status: COMPLETED | OUTPATIENT
Start: 2019-10-07 | End: 2019-10-07

## 2019-10-07 RX ADMIN — ROPIVACAINE HYDROCHLORIDE 4 ML: 5 INJECTION, SOLUTION EPIDURAL; INFILTRATION; PERINEURAL at 09:23

## 2019-10-07 RX ADMIN — TRIAMCINOLONE ACETONIDE 40 MG: 40 INJECTION, SUSPENSION INTRA-ARTICULAR; INTRAMUSCULAR at 09:23

## 2019-10-07 NOTE — PROGRESS NOTES
Procedure   Large Joint Arthrocentesis: L knee  Date/Time: 10/7/2019 9:23 AM  Consent given by: patient  Site marked: site marked  Timeout: Immediately prior to procedure a time out was called to verify the correct patient, procedure, equipment, support staff and site/side marked as required   Supporting Documentation  Indications: pain   Procedure Details  Location: knee - L knee  Preparation: Patient was prepped and draped in the usual sterile fashion  Needle size: 22 G  Approach: anterolateral  Medications administered: 4 mL ropivacaine 0.5 %; 40 mg triamcinolone acetonide 40 MG/ML  Patient tolerance: patient tolerated the procedure well with no immediate complications

## 2019-10-07 NOTE — PROGRESS NOTES
[facility]    Subjective     Chief Complaint   Patient presents with   • Follow-up     6 months- Primary osteoarthritis of left knee        HPI    Buzz Elmore is a 59 y.o. male.  He follows up today for his left knee.  Left knee pain is 6 out of 10, aching in quality, and improved for 2 months after the injection on his last visit, but has gotten to the point where he would like to have another injection today.  Not yet interested in surgical intervention.      Patient Active Problem List   Diagnosis   • Coagulation disorder (CMS/HCC)   • Benign prostatic hyperplasia with urinary obstruction   • Chest pain   • Mechanical complication due to tissue graft   • Atherosclerosis of coronary artery   • Hyperglycemia   • Gastroesophageal reflux disease   • Hyperlipidemia   • Hypertension   • Knee pain   • Lipoma   • Low back pain   • Calculus of kidney   • Muscle pain   • Vitamin D deficiency   • IHD (ischemic heart disease)   • Obesity   • GERD (gastroesophageal reflux disease)   • Chronic low back pain   • Acute serous otitis media   • Mixed conductive and sensorineural hearing loss   • Bilateral sensorineural hearing loss   • Otitis media of left ear     Past Medical History:   Diagnosis Date   • Chronic low back pain    • GERD (gastroesophageal reflux disease)    • Hyperlipidemia    • Hypertension    • IHD (ischemic heart disease)    • Obesity       Past Surgical History:   Procedure Laterality Date   • CORONARY STENT PLACEMENT     • HAND SURGERY      skin graft on finger   • KNEE ARTHROTOMY      History of Arthrotomy Of Knee With Medial Meniscectomy   • KNEE SURGERY Right     meniscus      Family History   Problem Relation Age of Onset   • Breast cancer Other    • Colon cancer Other    • Heart disease Other    • Breast cancer Mother    • Heart disease Mother    • Cancer Mother    • Hypertension Mother    • Heart attack Mother    • Colon cancer Father    • Cancer Father    •  Hypertension Father    • Heart attack Father    • Heart disease Paternal Grandfather      Social History     Socioeconomic History   • Marital status:      Spouse name: Not on file   • Number of children: Not on file   • Years of education: Not on file   • Highest education level: Not on file   Tobacco Use   • Smoking status: Never Smoker   • Smokeless tobacco: Never Used   Substance and Sexual Activity   • Alcohol use: No   • Drug use: No   • Sexual activity: Defer      Current Outpatient Medications on File Prior to Visit   Medication Sig Dispense Refill   • aspirin (ASPIRIN LOW DOSE) 81 MG tablet Take  by mouth daily.     • carvedilol (COREG) 12.5 MG tablet TAKE ONE TABLET BY MOUTH TWICE A DAY WITH MEALS 60 tablet 0   • Cholecalciferol (VITAMIN D) 2000 UNITS capsule Take  by mouth Daily.     • fenofibrate (TRICOR) 48 MG tablet Take 1 tablet by mouth Daily. 90 tablet 0   • fluticasone (FLONASE) 50 MCG/ACT nasal spray 2 sprays into the nostril(s) as directed by provider Daily. 3 bottle 3   • loratadine (CLARITIN) 10 MG tablet Take 1 tablet by mouth Daily. 90 tablet 3   • pantoprazole (PROTONIX) 40 MG EC tablet TAKE ONE TABLET BY MOUTH DAILY 90 tablet 0   • prasugrel (EFFIENT) 10 MG tablet TAKE ONE TABLET BY MOUTH DAILY 90 tablet 3   • rosuvastatin (CRESTOR) 40 MG tablet Take 1 tablet by mouth Daily. 90 tablet 3   • telmisartan-hydrochlorothiazide (MICARDIS HCT) 80-25 MG per tablet Take 1 tablet by mouth Daily. 30 tablet 11     No current facility-administered medications on file prior to visit.       Allergies   Allergen Reactions   • Atorvastatin Myalgia        Review of Systems   Constitutional: Negative for activity change, appetite change, chills, diaphoresis, fatigue, fever and unexpected weight change.   HENT: Negative for congestion, dental problem, drooling, ear discharge, ear pain, facial swelling, hearing loss, mouth sores, nosebleeds, postnasal drip, rhinorrhea, sinus pressure, sneezing, sore  "throat, tinnitus, trouble swallowing and voice change.    Eyes: Negative for photophobia, pain, discharge, redness, itching and visual disturbance.   Respiratory: Negative for apnea, cough, choking, chest tightness, shortness of breath, wheezing and stridor.    Cardiovascular: Negative for chest pain, palpitations and leg swelling.   Gastrointestinal: Negative for abdominal distention, abdominal pain, anal bleeding, blood in stool, constipation, diarrhea, nausea, rectal pain and vomiting.   Endocrine: Negative for cold intolerance, heat intolerance, polydipsia, polyphagia and polyuria.   Genitourinary: Negative for decreased urine volume, difficulty urinating, dysuria, enuresis, flank pain, frequency, genital sores, hematuria and urgency.   Musculoskeletal: Positive for arthralgias. Negative for back pain, gait problem, joint swelling, myalgias, neck pain and neck stiffness.   Skin: Negative for color change, pallor, rash and wound.   Allergic/Immunologic: Negative for environmental allergies, food allergies and immunocompromised state.   Neurological: Negative for dizziness, tremors, seizures, syncope, facial asymmetry, speech difficulty, weakness, light-headedness, numbness and headaches.   Hematological: Negative for adenopathy. Does not bruise/bleed easily.   Psychiatric/Behavioral: Negative for agitation, behavioral problems, confusion, decreased concentration, dysphoric mood, hallucinations, self-injury, sleep disturbance and suicidal ideas. The patient is not nervous/anxious and is not hyperactive.         Objective      Physical Exam  Pulse 71   Ht 185.4 cm (72.99\")   Wt 116 kg (256 lb 6.3 oz)   SpO2 98%   BMI 33.83 kg/m²     Body mass index is 33.83 kg/m².    General:   Mental Status:  Alert   Appearance: Cooperative, in no acute distress   Build and Nutrition: Well-nourished well-developed male   Orientation: Alert and oriented to person, place and time   Posture: Normal   Gait: " Normal    Integument:   Left knee: No skin lesions, no rash, no ecchymosis    Lower Extremities:   Left Knee:    Tenderness:  Medial joint line tenderness    Effusion:  None    Swelling:  None    Crepitus: Positive    Range of motion:  Extension: 0°       Flexion: 120°  Instability: No varus laxity, no valgus laxity, negative anterior drawer  Deformities:  Varus        Assessment and Plan     Buzz was seen today for follow-up.    Diagnoses and all orders for this visit:    Primary osteoarthritis of left knee  -     Large Joint Arthrocentesis: L knee        1. Primary osteoarthritis of left knee        I reviewed my findings with the patient today.  He would like to have an injection for his left knee, and this was provided.  I will see him back in 4 months, but sooner for any problems.    Of note, he had 100% relief just few minutes following the injection today.    Return in about 4 months (around 2/7/2020).      Medical Decision Making  Management Options : prescription/IM medicine        Prince Castellanos MD  10/07/19  9:49 AM

## 2019-10-27 RX ORDER — CARVEDILOL 12.5 MG/1
TABLET ORAL
Qty: 60 TABLET | Refills: 0 | Status: SHIPPED | OUTPATIENT
Start: 2019-10-27 | End: 2019-11-27 | Stop reason: SDUPTHER

## 2019-11-05 RX ORDER — FENOFIBRATE 48 MG/1
TABLET, COATED ORAL
Qty: 30 TABLET | Refills: 0 | Status: SHIPPED | OUTPATIENT
Start: 2019-11-05 | End: 2019-12-04 | Stop reason: SDUPTHER

## 2019-11-27 RX ORDER — CARVEDILOL 12.5 MG/1
TABLET ORAL
Qty: 30 TABLET | Refills: 0 | Status: SHIPPED | OUTPATIENT
Start: 2019-11-27 | End: 2019-12-17 | Stop reason: SDUPTHER

## 2019-12-04 RX ORDER — FENOFIBRATE 48 MG/1
TABLET, COATED ORAL
Qty: 14 TABLET | Refills: 0 | Status: SHIPPED | OUTPATIENT
Start: 2019-12-04 | End: 2019-12-16 | Stop reason: SDUPTHER

## 2019-12-09 ENCOUNTER — OFFICE VISIT (OUTPATIENT)
Dept: ENDOCRINOLOGY | Facility: CLINIC | Age: 59
End: 2019-12-09

## 2019-12-09 ENCOUNTER — OFFICE VISIT (OUTPATIENT)
Dept: CARDIOLOGY | Facility: CLINIC | Age: 59
End: 2019-12-09

## 2019-12-09 VITALS
SYSTOLIC BLOOD PRESSURE: 118 MMHG | HEIGHT: 73 IN | BODY MASS INDEX: 35.39 KG/M2 | DIASTOLIC BLOOD PRESSURE: 76 MMHG | WEIGHT: 267 LBS | HEART RATE: 64 BPM | OXYGEN SATURATION: 96 %

## 2019-12-09 VITALS
OXYGEN SATURATION: 98 % | HEIGHT: 73 IN | DIASTOLIC BLOOD PRESSURE: 80 MMHG | BODY MASS INDEX: 35.31 KG/M2 | HEART RATE: 57 BPM | WEIGHT: 266.4 LBS | SYSTOLIC BLOOD PRESSURE: 124 MMHG

## 2019-12-09 DIAGNOSIS — R73.9 HYPERGLYCEMIA: Primary | ICD-10-CM

## 2019-12-09 DIAGNOSIS — I10 ESSENTIAL HYPERTENSION: ICD-10-CM

## 2019-12-09 DIAGNOSIS — E55.9 VITAMIN D DEFICIENCY: ICD-10-CM

## 2019-12-09 DIAGNOSIS — I25.9 IHD (ISCHEMIC HEART DISEASE): Primary | ICD-10-CM

## 2019-12-09 DIAGNOSIS — E78.2 MIXED HYPERLIPIDEMIA: ICD-10-CM

## 2019-12-09 LAB
ALBUMIN SERPL-MCNC: 4 G/DL (ref 3.5–5.2)
ALBUMIN/GLOB SERPL: 1.2 G/DL
ALP SERPL-CCNC: 40 U/L (ref 39–117)
ALT SERPL W P-5'-P-CCNC: 23 U/L (ref 1–41)
ANION GAP SERPL CALCULATED.3IONS-SCNC: 11.4 MMOL/L (ref 5–15)
AST SERPL-CCNC: 27 U/L (ref 1–40)
BILIRUB SERPL-MCNC: 0.7 MG/DL (ref 0.2–1.2)
BUN BLD-MCNC: 23 MG/DL (ref 6–20)
BUN/CREAT SERPL: 22.5 (ref 7–25)
CALCIUM SPEC-SCNC: 10.2 MG/DL (ref 8.6–10.5)
CHLORIDE SERPL-SCNC: 102 MMOL/L (ref 98–107)
CHOLEST SERPL-MCNC: 143 MG/DL (ref 0–200)
CO2 SERPL-SCNC: 26.6 MMOL/L (ref 22–29)
CREAT BLD-MCNC: 1.02 MG/DL (ref 0.76–1.27)
GFR SERPL CREATININE-BSD FRML MDRD: 75 ML/MIN/1.73
GLOBULIN UR ELPH-MCNC: 3.4 GM/DL
GLUCOSE BLD-MCNC: 107 MG/DL (ref 65–99)
GLUCOSE BLDC GLUCOMTR-MCNC: 126 MG/DL (ref 70–130)
HBA1C MFR BLD: 6.1 %
HDLC SERPL-MCNC: 37 MG/DL (ref 40–60)
LDLC SERPL CALC-MCNC: 81 MG/DL (ref 0–100)
LDLC/HDLC SERPL: 2.19 {RATIO}
POTASSIUM BLD-SCNC: 4.4 MMOL/L (ref 3.5–5.2)
PROT SERPL-MCNC: 7.4 G/DL (ref 6–8.5)
SODIUM BLD-SCNC: 140 MMOL/L (ref 136–145)
T4 FREE SERPL-MCNC: 1.34 NG/DL (ref 0.93–1.7)
TRIGL SERPL-MCNC: 124 MG/DL (ref 0–150)
TSH SERPL DL<=0.05 MIU/L-ACNC: 1.55 UIU/ML (ref 0.27–4.2)
VLDLC SERPL-MCNC: 24.8 MG/DL (ref 5–40)

## 2019-12-09 PROCEDURE — 80061 LIPID PANEL: CPT | Performed by: INTERNAL MEDICINE

## 2019-12-09 PROCEDURE — 90471 IMMUNIZATION ADMIN: CPT | Performed by: INTERNAL MEDICINE

## 2019-12-09 PROCEDURE — 99214 OFFICE O/P EST MOD 30 MIN: CPT | Performed by: INTERNAL MEDICINE

## 2019-12-09 PROCEDURE — 90686 IIV4 VACC NO PRSV 0.5 ML IM: CPT | Performed by: INTERNAL MEDICINE

## 2019-12-09 PROCEDURE — 84443 ASSAY THYROID STIM HORMONE: CPT | Performed by: INTERNAL MEDICINE

## 2019-12-09 PROCEDURE — 83036 HEMOGLOBIN GLYCOSYLATED A1C: CPT | Performed by: INTERNAL MEDICINE

## 2019-12-09 PROCEDURE — 84439 ASSAY OF FREE THYROXINE: CPT | Performed by: INTERNAL MEDICINE

## 2019-12-09 PROCEDURE — 82947 ASSAY GLUCOSE BLOOD QUANT: CPT | Performed by: INTERNAL MEDICINE

## 2019-12-09 PROCEDURE — 82306 VITAMIN D 25 HYDROXY: CPT | Performed by: INTERNAL MEDICINE

## 2019-12-09 PROCEDURE — 80053 COMPREHEN METABOLIC PANEL: CPT | Performed by: INTERNAL MEDICINE

## 2019-12-09 RX ORDER — LOSARTAN POTASSIUM AND HYDROCHLOROTHIAZIDE 25; 100 MG/1; MG/1
TABLET ORAL
COMMUNITY
End: 2020-09-18

## 2019-12-09 RX ORDER — FLUTICASONE PROPIONATE 50 MCG
2 SPRAY, SUSPENSION (ML) NASAL DAILY
Qty: 3 BOTTLE | Refills: 3 | Status: SHIPPED | OUTPATIENT
Start: 2019-12-09 | End: 2020-09-18

## 2019-12-09 RX ORDER — LORATADINE 10 MG/1
10 TABLET ORAL DAILY
Qty: 90 TABLET | Refills: 3 | Status: SHIPPED | OUTPATIENT
Start: 2019-12-09 | End: 2020-09-18

## 2019-12-09 RX ORDER — PANTOPRAZOLE SODIUM 40 MG/1
40 TABLET, DELAYED RELEASE ORAL DAILY
Qty: 90 TABLET | Refills: 3 | Status: SHIPPED | OUTPATIENT
Start: 2019-12-09 | End: 2020-09-21 | Stop reason: SDUPTHER

## 2019-12-09 NOTE — PROGRESS NOTES
Subjective:     Encounter Date:12/09/2019      Patient ID: Buzz Elmore is a 59 y.o. male.    Chief Complaint: IHD (ischemic heart disease)      PROBLEM LIST:  1. Ischemic heart disease  a. [facility] for ACS, 07/28/2011: PTCA/PCI to proximal RCA. EF 60%.  b. Stress Test, 12/02/2016: EF > 70%. No ischemia  2. Hyperlipidemia  3. Hypertension  4. Chronic low back pain  5. GERD  6. Obesity      History of Present Illness  Buzz Elmore returns today for an annual follow up with a history of ischemic heart disease, hyperlipidemia, and hypertension. Since last visit he jumped off the side of a truck and is currently wearing a knee brace. He is going to have knee surgery in the near future. He is otherwise doing well from a cardiac standpoint. He remains active by working part time at his job. Pt has stopped drinking soda to decrease his sugar intake. He has not had soda in three months. Denies any exertional chest pain, shortness of breath, orthopnea, PND, or palpitations.    Allergies   Allergen Reactions   • Atorvastatin Myalgia         Current Outpatient Medications:   •  aspirin (ASPIRIN LOW DOSE) 81 MG tablet, Take 81 mg by mouth Daily., Disp: , Rfl:   •  carvedilol (COREG) 12.5 MG tablet, TAKE ONE TABLET BY MOUTH TWICE A DAY WITH MEALS, Disp: 30 tablet, Rfl: 0  •  Cholecalciferol (VITAMIN D) 2000 UNITS capsule, Take 2,000 Units by mouth Daily., Disp: , Rfl:   •  fenofibrate (TRICOR) 48 MG tablet, TAKE ONE TABLET BY MOUTH DAILY, Disp: 14 tablet, Rfl: 0  •  fluticasone (FLONASE) 50 MCG/ACT nasal spray, 2 sprays into the nostril(s) as directed by provider Daily., Disp: 3 bottle, Rfl: 3  •  loratadine (CLARITIN) 10 MG tablet, Take 1 tablet by mouth Daily., Disp: 90 tablet, Rfl: 3  •  pantoprazole (PROTONIX) 40 MG EC tablet, TAKE ONE TABLET BY MOUTH DAILY, Disp: 90 tablet, Rfl: 0  •  prasugrel (EFFIENT) 10 MG tablet, TAKE ONE TABLET BY MOUTH DAILY, Disp: 90 tablet, Rfl: 3  •  rosuvastatin (CRESTOR) 40 MG tablet, Take  "1 tablet by mouth Daily., Disp: 90 tablet, Rfl: 3  •  telmisartan-hydrochlorothiazide (MICARDIS HCT) 80-25 MG per tablet, Take 1 tablet by mouth Daily., Disp: 30 tablet, Rfl: 11    The following portions of the patient's history were reviewed and updated as appropriate: allergies, current medications, past family history, past medical history, past social history, past surgical history and problem list.    Review of Systems   Constitution: Negative.   Cardiovascular: Negative.  Negative for chest pain, cyanosis, dyspnea on exertion, irregular heartbeat, leg swelling, orthopnea, palpitations and paroxysmal nocturnal dyspnea.   Respiratory: Negative.  Negative for shortness of breath.    Hematologic/Lymphatic: Negative for bleeding problem. Does not bruise/bleed easily.   Skin: Negative for rash.   Musculoskeletal: Negative for muscle weakness and myalgias.   Gastrointestinal: Negative for heartburn, nausea and vomiting.   Neurological: Negative.           Objective:     Vitals:    12/09/19 0911   BP: 118/76   BP Location: Left arm   Patient Position: Sitting   Pulse: 64   SpO2: 96%   Weight: 121 kg (267 lb)   Height: 185.4 cm (73\")         Physical Exam   Constitutional: He is oriented to person, place, and time. He appears well-developed and well-nourished.   HENT:   Mouth/Throat: Oropharynx is clear and moist.   Neck: No JVD present. Carotid bruit is not present. No thyromegaly present.   Cardiovascular: Regular rhythm, S1 normal, S2 normal and intact distal pulses. Exam reveals no gallop, no S3 and no S4.   Murmur heard.   Systolic murmur is present with a grade of 2/6 at the lower left sternal border.  Pulses:       Carotid pulses are 2+ on the right side, and 2+ on the left side.       Radial pulses are 2+ on the right side, and 2+ on the left side.   Pulmonary/Chest: Breath sounds normal.   Abdominal: Soft. Bowel sounds are normal. He exhibits no mass. There is no tenderness.   Musculoskeletal: He exhibits no " edema.   Neurological: He is alert and oriented to person, place, and time.   Skin: Skin is warm and dry. No rash noted.       Lab Review:         Procedures        Assessment:   Buzz was seen today for ihd (ischemic heart disease).    Diagnoses and all orders for this visit:    IHD (ischemic heart disease)    Mixed hyperlipidemia    Essential hypertension        Impression:  1. Ischemic heart disease, stable and asymptomatic. Pt is on aspirin 81 mg for anticoagulation.    2. Hyperlipidemia, no current labs. Pt is on statin therapy.  3. Hypertension, well controlled on carvedilol 12.5 mg.  4. Possible preop knee surgery.    Plan:  1. Stable cardiac status   2. Will need preoperative ischemic evaluation if knee surgery contemplated.  3. Continue current medications.  4. Revisit in 12 MO, or sooner as needed.    Scribed for Db Kitchen MD by Zandra Coughlin. 12/9/2019  9:34 AM    Db Kitchen MD      Please note that portions of this note may have been completed with a voice recognition program. Efforts were made to edit the dictations, but occasionally words are mistranscribed.

## 2019-12-09 NOTE — ASSESSMENT & PLAN NOTE
Blood sugar and 90 day average sugar reviewed  Results for orders placed or performed in visit on 12/09/19   POC Glycosylated Hemoglobin (Hb A1C)   Result Value Ref Range    Hemoglobin A1C 6.1 %   POC Glucose Fingerstick   Result Value Ref Range    Glucose 126 70 - 130 mg/dL     Average sugar is 120   Continue current monitoring and medication   Average sugar c/w prediabetes  F/u 4-6 months

## 2019-12-09 NOTE — PROGRESS NOTES
Buzz Elmore 59 y.o.  CC:Follow-up; Hypertension; Hyperlipidemia; and Hyperglycemia      [place]: Follow-up; Hypertension; Hyperlipidemia; and Hyperglycemia    bp is good   Is on low fat diet, crestor 40 mg and tricor   Is on effient   Blood sugar and 90 day average sugar reviewed  Results for orders placed or performed in visit on 12/09/19   POC Glycosylated Hemoglobin (Hb A1C)   Result Value Ref Range    Hemoglobin A1C 6.1 %   POC Glucose Fingerstick   Result Value Ref Range    Glucose 126 70 - 130 mg/dL     Average sugar is 120   Has knee brace from Dr Castellanos- left knee  Also saw Dr Kitchen today - no lab work yet    Allergies   Allergen Reactions   • Atorvastatin Myalgia       Current Outpatient Medications:   •  aspirin (ASPIRIN LOW DOSE) 81 MG tablet, Take 81 mg by mouth Daily., Disp: , Rfl:   •  carvedilol (COREG) 12.5 MG tablet, TAKE ONE TABLET BY MOUTH TWICE A DAY WITH MEALS, Disp: 30 tablet, Rfl: 0  •  Cholecalciferol (VITAMIN D) 2000 UNITS capsule, Take 2,000 Units by mouth Daily., Disp: , Rfl:   •  fenofibrate (TRICOR) 48 MG tablet, TAKE ONE TABLET BY MOUTH DAILY, Disp: 14 tablet, Rfl: 0  •  fluticasone (FLONASE) 50 MCG/ACT nasal spray, 2 sprays into the nostril(s) as directed by provider Daily., Disp: 3 bottle, Rfl: 3  •  loratadine (CLARITIN) 10 MG tablet, Take 1 tablet by mouth Daily., Disp: 90 tablet, Rfl: 3  •  pantoprazole (PROTONIX) 40 MG EC tablet, Take 1 tablet by mouth Daily., Disp: 90 tablet, Rfl: 3  •  prasugrel (EFFIENT) 10 MG tablet, TAKE ONE TABLET BY MOUTH DAILY, Disp: 90 tablet, Rfl: 3  •  rosuvastatin (CRESTOR) 40 MG tablet, Take 1 tablet by mouth Daily., Disp: 90 tablet, Rfl: 3  •  telmisartan-hydrochlorothiazide (MICARDIS HCT) 80-25 MG per tablet, Take 1 tablet by mouth Daily., Disp: 30 tablet, Rfl: 11  Patient Active Problem List    Diagnosis   • IHD (ischemic heart disease) [I25.9]   • Obesity [E66.9]   • GERD (gastroesophageal reflux disease) [K21.9]   • Chronic low back pain [M54.5,  G89.29]   • Coagulation disorder (CMS/HCC) [D68.9]   • Benign prostatic hyperplasia with urinary obstruction [N40.1, N13.8]   • Chest pain [R07.9]   • Mechanical complication due to tissue graft [T85.698A]   • Atherosclerosis of coronary artery [I25.10]   • Hyperglycemia [R73.9]   • Gastroesophageal reflux disease [K21.9]   • Hyperlipidemia [E78.5]   • Hypertension [I10]   • Knee pain [M25.569]   • Lipoma [D17.9]   • Low back pain [M54.5]   • Calculus of kidney [N20.0]   • Muscle pain [M79.10]   • Vitamin D deficiency [E55.9]   • Acute serous otitis media [H65.00]   • 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 Negative for appetite change, chills, diaphoresis, fatigue, fever and unexpected weight change.   HENT: Negative for congestion, dental problem, drooling, ear discharge, ear pain, facial swelling, hearing loss, mouth sores, nosebleeds, postnasal drip, rhinorrhea, sinus pressure, sneezing, sore throat, tinnitus, trouble swallowing and voice change.    Eyes: Negative for photophobia, pain, discharge, redness, itching and visual disturbance.   Respiratory: Negative for apnea, cough, choking, chest tightness, shortness of breath, wheezing and stridor.    Cardiovascular: Negative for chest pain, palpitations and leg swelling.   Gastrointestinal: Negative for abdominal distention, abdominal pain, anal bleeding, blood in stool, constipation, diarrhea, nausea, rectal pain and vomiting.   Endocrine: Negative for cold intolerance, heat intolerance, polydipsia, polyphagia and polyuria.   Genitourinary: Negative for decreased urine volume, difficulty urinating, dysuria, enuresis, flank pain, frequency, genital sores, hematuria and urgency.   Musculoskeletal: Positive for arthralgias. Negative for back pain, gait problem, joint swelling, myalgias, neck  "pain and neck stiffness.   Skin: Negative for color change, pallor, rash and wound.   Allergic/Immunologic: Negative for environmental allergies, food allergies and immunocompromised state.   Neurological: Negative for dizziness, tremors, seizures, syncope, facial asymmetry, speech difficulty, weakness, light-headedness, numbness and headaches.   Hematological: Negative for adenopathy. Does not bruise/bleed easily.   Psychiatric/Behavioral: Negative for agitation, behavioral problems, confusion, decreased concentration, dysphoric mood, hallucinations, self-injury, sleep disturbance and suicidal ideas. The patient is not nervous/anxious and is not hyperactive.      Social History     Socioeconomic History   • Marital status:      Spouse name: Not on file   • Number of children: Not on file   • Years of education: Not on file   • Highest education level: Not on file   Tobacco Use   • Smoking status: Never Smoker   • Smokeless tobacco: Never Used   Substance and Sexual Activity   • Alcohol use: No   • Drug use: No   • Sexual activity: Defer     Family History   Problem Relation Age of Onset   • Breast cancer Other    • Colon cancer Other    • Heart disease Other    • Breast cancer Mother    • Heart disease Mother    • Hypertension Mother    • Heart attack Mother    • Colon cancer Father    • Hypertension Father    • Heart attack Father    • Heart disease Paternal Grandfather      /80   Pulse 57   Ht 185.4 cm (73\")   Wt 121 kg (266 lb 6.4 oz)   SpO2 98%   BMI 35.15 kg/m²   Physical Exam   Constitutional: He is oriented to person, place, and time. He appears well-developed and well-nourished.   HENT:   Head: Normocephalic and atraumatic.   Nose: Nose normal.   Mouth/Throat: Oropharynx is clear and moist.   Eyes: Pupils are equal, round, and reactive to light. Conjunctivae, EOM and lids are normal.   Neck: Trachea normal and normal range of motion. Neck supple. Carotid bruit is not present. No tracheal " deviation present. No thyroid mass and no thyromegaly present.   Cardiovascular: Normal rate, regular rhythm, normal heart sounds and intact distal pulses. Exam reveals no gallop and no friction rub.   No murmur heard.  Pulmonary/Chest: Effort normal and breath sounds normal. No respiratory distress. He has no wheezes.   Musculoskeletal: Normal range of motion. He exhibits no edema or deformity.   Lymphadenopathy:     He has no cervical adenopathy.   Neurological: He is alert and oriented to person, place, and time. He has normal reflexes. He displays normal reflexes. No cranial nerve deficit.   Skin: Skin is warm and dry. No rash noted. No cyanosis or erythema. Nails show no clubbing.   Psychiatric: He has a normal mood and affect. His speech is normal and behavior is normal. Judgment and thought content normal. Cognition and memory are normal.   Nursing note and vitals reviewed.    Results for orders placed or performed in visit on 11/26/18   Comprehensive Metabolic Panel   Result Value Ref Range    Glucose 127 (H) 70 - 100 mg/dL    BUN 25 (H) 9 - 23 mg/dL    Creatinine 1.02 0.60 - 1.30 mg/dL    Sodium 139 132 - 146 mmol/L    Potassium 4.3 3.5 - 5.5 mmol/L    Chloride 102 99 - 109 mmol/L    CO2 30.0 20.0 - 31.0 mmol/L    Calcium 9.8 8.7 - 10.4 mg/dL    Total Protein 7.1 5.7 - 8.2 g/dL    Albumin 4.51 3.20 - 4.80 g/dL    ALT (SGPT) 28 7 - 40 U/L    AST (SGOT) 27 0 - 33 U/L    Alkaline Phosphatase 47 25 - 100 U/L    Total Bilirubin 0.6 0.3 - 1.2 mg/dL    eGFR Non African Amer 76 >60 mL/min/1.73    Globulin 2.6 gm/dL    A/G Ratio 1.7 1.5 - 2.5 g/dL    BUN/Creatinine Ratio 24.5 7.0 - 25.0    Anion Gap 7.0 3.0 - 11.0 mmol/L   CBC Auto Differential   Result Value Ref Range    WBC 8.78 3.50 - 10.80 10*3/mm3    RBC 4.81 4.20 - 5.76 10*6/mm3    Hemoglobin 15.1 13.1 - 17.5 g/dL    Hematocrit 44.9 38.9 - 50.9 %    MCV 93.3 80.0 - 99.0 fL    MCH 31.4 (H) 27.0 - 31.0 pg    MCHC 33.6 32.0 - 36.0 g/dL    RDW 14.0 11.3 - 14.5 %     RDW-SD 46.3 37.0 - 54.0 fl    MPV 10.6 6.0 - 12.0 fL    Platelets 208 150 - 450 10*3/mm3    Neutrophil % 44.7 41.0 - 71.0 %    Lymphocyte % 46.0 (H) 24.0 - 44.0 %    Monocyte % 7.7 0.0 - 12.0 %    Eosinophil % 1.4 0.0 - 3.0 %    Basophil % 0.2 0.0 - 1.0 %    Immature Grans % 0.2 0.0 - 0.6 %    Neutrophils, Absolute 3.92 1.50 - 8.30 10*3/mm3    Lymphocytes, Absolute 4.04 0.60 - 4.80 10*3/mm3    Monocytes, Absolute 0.68 0.00 - 1.00 10*3/mm3    Eosinophils, Absolute 0.12 0.00 - 0.30 10*3/mm3    Basophils, Absolute 0.02 0.00 - 0.20 10*3/mm3    Immature Grans, Absolute 0.02 0.00 - 0.03 10*3/mm3   Microalbumin / Creatinine Urine Ratio - Urine, Clean Catch   Result Value Ref Range    Creatinine, Urine 118.6 Not Estab. mg/dL    Microalbumin, Urine <3.0 Not Estab. ug/mL    Microalbumin/Creatinine Ratio <2.5 0.0 - 30.0 mg/g creat   Lipid Panel   Result Value Ref Range    Total Cholesterol 185 0 - 200 mg/dL    Triglycerides 139 0 - 150 mg/dL    HDL Cholesterol 48 40 - 60 mg/dL    LDL Cholesterol  130 0 - 130 mg/dL   TSH   Result Value Ref Range    TSH 2.464 0.350 - 5.350 mIU/mL   T4, Free   Result Value Ref Range    Free T4 1.03 0.89 - 1.76 ng/dL   POC Glycosylated Hemoglobin (Hb A1C)   Result Value Ref Range    Hemoglobin A1C 6.2 %   POC Glucose Fingerstick   Result Value Ref Range    Glucose 144 (A) 70 - 130 mg/dL     Buzz was seen today for follow-up, hypertension, hyperlipidemia and hyperglycemia.    Diagnoses and all orders for this visit:    Hyperglycemia  -     POC Glycosylated Hemoglobin (Hb A1C)  -     POC Glucose Fingerstick    Other orders  -     fluticasone (FLONASE) 50 MCG/ACT nasal spray; 2 sprays into the nostril(s) as directed by provider Daily.  -     loratadine (CLARITIN) 10 MG tablet; Take 1 tablet by mouth Daily.  -     pantoprazole (PROTONIX) 40 MG EC tablet; Take 1 tablet by mouth Daily.      Problem List Items Addressed This Visit        Cardiovascular and Mediastinum    Hypertension     bp is good    Continue monitoring and medication          Hyperlipidemia     Check lipids today - on crestor 40 mg daily          Relevant Orders    Lipid Panel    TSH    T4, Free       Digestive    Vitamin D deficiency     Check vitamin d levels          Relevant Orders    Vitamin D 25 Hydroxy       Other    Hyperglycemia - Primary     Blood sugar and 90 day average sugar reviewed  Results for orders placed or performed in visit on 12/09/19   POC Glycosylated Hemoglobin (Hb A1C)   Result Value Ref Range    Hemoglobin A1C 6.1 %   POC Glucose Fingerstick   Result Value Ref Range    Glucose 126 70 - 130 mg/dL     Average sugar is 120   Continue current monitoring and medication   Average sugar c/w prediabetes  F/u 4-6 months          Relevant Orders    POC Glycosylated Hemoglobin (Hb A1C) (Completed)    POC Glucose Fingerstick (Completed)    Comprehensive Metabolic Panel        Return in about 6 months (around 6/9/2020) for Recheck 30 min .    Mayte Herrera MA  Signed Maida Rey MD

## 2019-12-10 LAB — 25(OH)D3 SERPL-MCNC: 19.6 NG/ML (ref 30–100)

## 2019-12-10 RX ORDER — ROSUVASTATIN CALCIUM 40 MG/1
TABLET, COATED ORAL
Qty: 90 TABLET | Refills: 2 | Status: SHIPPED | OUTPATIENT
Start: 2019-12-10 | End: 2020-09-08

## 2019-12-12 DIAGNOSIS — I10 ESSENTIAL HYPERTENSION: Primary | ICD-10-CM

## 2019-12-12 RX ORDER — TELMISARTAN AND HYDROCHLORTHIAZIDE 80; 25 MG/1; MG/1
1 TABLET ORAL DAILY
Qty: 30 TABLET | Refills: 5 | Status: SHIPPED | OUTPATIENT
Start: 2019-12-12 | End: 2020-04-02 | Stop reason: SDUPTHER

## 2019-12-16 RX ORDER — FENOFIBRATE 48 MG/1
TABLET, COATED ORAL
Qty: 30 TABLET | Refills: 3 | Status: SHIPPED | OUTPATIENT
Start: 2019-12-16 | End: 2020-04-15

## 2019-12-17 RX ORDER — CARVEDILOL 12.5 MG/1
TABLET ORAL
Qty: 30 TABLET | Refills: 3 | Status: SHIPPED | OUTPATIENT
Start: 2019-12-17 | End: 2020-02-18

## 2020-02-18 RX ORDER — CARVEDILOL 12.5 MG/1
TABLET ORAL
Qty: 60 TABLET | Refills: 3 | Status: SHIPPED | OUTPATIENT
Start: 2020-02-18 | End: 2020-06-19

## 2020-03-19 ENCOUNTER — TELEPHONE (OUTPATIENT)
Dept: ENDOCRINOLOGY | Facility: CLINIC | Age: 60
End: 2020-03-19

## 2020-03-19 NOTE — TELEPHONE ENCOUNTER
Ok to give 40/12.5 mg or 80/25 mg for 30 days   Ok to check with other pharmacies and we can send to another [facility] as well  Thanks, amarjit

## 2020-03-19 NOTE — TELEPHONE ENCOUNTER
PATIENTS WIFE CALLED STATING    telmisartan-hydrochlorothiazide (MICARDIS HCT) 80-25 MG per tablet      IS ON BACKORDER UNTIL THE 30TH AND PHARMACY ADVISED HER TO CALL IN TO GET PCP TO GET AN ALTERNATIVE UNTIK MEDICATION IS IN.    DAVID HICKEY  [phone]    PLEASE ADVISE [id number]

## 2020-04-02 DIAGNOSIS — I10 ESSENTIAL HYPERTENSION: ICD-10-CM

## 2020-04-02 RX ORDER — TELMISARTAN AND HYDROCHLORTHIAZIDE 80; 25 MG/1; MG/1
1 TABLET ORAL DAILY
Qty: 30 TABLET | Refills: 5 | Status: CANCELLED | OUTPATIENT
Start: 2020-04-02 | End: 2021-04-02

## 2020-04-02 RX ORDER — TELMISARTAN AND HYDROCHLORTHIAZIDE 80; 25 MG/1; MG/1
1 TABLET ORAL DAILY
Qty: 30 TABLET | Refills: 5 | Status: SHIPPED | OUTPATIENT
Start: 2020-04-02 | End: 2020-09-21 | Stop reason: SDUPTHER

## 2020-04-02 NOTE — TELEPHONE ENCOUNTER
PATIENT IS OUT OF MEDICATION. INSURANCE IS NOT WANTING TO COVER THE CHANGED PRESCRIPTION WE RECENTLY SENT TO PHARMACY. THEY NEED TO SEE WHAT OPTIONS PATIENT HAS FOR HIS BLOOD PRESSURE MEDICATION. PATIENTS WIFE (TRAMAINE) WOULD LIKE US TO CALL THEM BACK ABOUT MEDICATION ISSUES. PHONE NUMBER [phone]

## 2020-04-02 NOTE — TELEPHONE ENCOUNTER
Contacted [facility] pharmacy, pharmacist states the medication Telmisartan-hydrochorothia 80-25 mg is not cover by the patients insurance.  Pharmacist states the medication that is cover is higher strength 80 mg 1 day but it is on backorder.       Med is not cover by insurance, Gabrielle, patient's wife will call patient's insurance to see what is on the formulary.    Call the insurance, but Iris from [employer] was unable to locate the patient's information.     Call Gabrielle, patient's wife and ask for her to call their insurance and find out what is on the formulary as an alternative to the Telmisartan-hydrochorothia for BP.  Gabrielle will contact their insurance and let me know.

## 2020-04-02 NOTE — TELEPHONE ENCOUNTER
Gabrielle, patient's wife called and states [facility] in [place] has Telmisartan-hydrochlorothiazide 80-25 mg that the patient needs, and it is covered by the patients insurance.    Sent refill to [facility] in [place].

## 2020-04-15 RX ORDER — FENOFIBRATE 48 MG/1
TABLET, COATED ORAL
Qty: 30 TABLET | Refills: 2 | Status: SHIPPED | OUTPATIENT
Start: 2020-04-15 | End: 2020-06-15 | Stop reason: SDUPTHER

## 2020-04-27 RX ORDER — PRASUGREL 10 MG/1
TABLET, FILM COATED ORAL
Qty: 90 TABLET | Refills: 2 | Status: SHIPPED | OUTPATIENT
Start: 2020-04-27 | End: 2021-01-25

## 2020-06-15 RX ORDER — FENOFIBRATE 48 MG/1
48 TABLET, COATED ORAL DAILY
Qty: 30 TABLET | Refills: 5 | Status: SHIPPED | OUTPATIENT
Start: 2020-06-15 | End: 2020-09-21 | Stop reason: SDUPTHER

## 2020-06-19 RX ORDER — CARVEDILOL 12.5 MG/1
TABLET ORAL
Qty: 60 TABLET | Refills: 3 | Status: SHIPPED | OUTPATIENT
Start: 2020-06-19 | End: 2020-10-14

## 2020-09-08 RX ORDER — ROSUVASTATIN CALCIUM 40 MG/1
TABLET, COATED ORAL
Qty: 90 TABLET | Refills: 1 | Status: SHIPPED | OUTPATIENT
Start: 2020-09-08 | End: 2020-09-21 | Stop reason: SDUPTHER

## 2020-09-21 ENCOUNTER — LAB (OUTPATIENT)
Dept: LAB | Facility: HOSPITAL | Age: 60
End: 2020-09-21

## 2020-09-21 ENCOUNTER — OFFICE VISIT (OUTPATIENT)
Dept: ORTHOPEDIC SURGERY | Facility: CLINIC | Age: 60
End: 2020-09-21

## 2020-09-21 ENCOUNTER — OFFICE VISIT (OUTPATIENT)
Dept: ENDOCRINOLOGY | Facility: CLINIC | Age: 60
End: 2020-09-21

## 2020-09-21 VITALS
BODY MASS INDEX: 33.66 KG/M2 | WEIGHT: 254 LBS | HEIGHT: 73 IN | OXYGEN SATURATION: 98 % | HEART RATE: 62 BPM | DIASTOLIC BLOOD PRESSURE: 72 MMHG | SYSTOLIC BLOOD PRESSURE: 112 MMHG

## 2020-09-21 VITALS — HEART RATE: 65 BPM | WEIGHT: 253.53 LBS | OXYGEN SATURATION: 98 % | HEIGHT: 73 IN | BODY MASS INDEX: 33.6 KG/M2

## 2020-09-21 DIAGNOSIS — M17.12 PRIMARY OSTEOARTHRITIS OF LEFT KNEE: Primary | ICD-10-CM

## 2020-09-21 DIAGNOSIS — E55.9 VITAMIN D DEFICIENCY: ICD-10-CM

## 2020-09-21 DIAGNOSIS — E78.2 MIXED HYPERLIPIDEMIA: ICD-10-CM

## 2020-09-21 DIAGNOSIS — I10 ESSENTIAL HYPERTENSION: ICD-10-CM

## 2020-09-21 DIAGNOSIS — R73.9 HYPERGLYCEMIA: Primary | ICD-10-CM

## 2020-09-21 LAB
25(OH)D3 SERPL-MCNC: 42.7 NG/ML (ref 30–100)
ALBUMIN SERPL-MCNC: 4.5 G/DL (ref 3.5–5.2)
ALBUMIN/GLOB SERPL: 1.4 G/DL
ALP SERPL-CCNC: 46 U/L (ref 39–117)
ALT SERPL W P-5'-P-CCNC: 21 U/L (ref 1–41)
ANION GAP SERPL CALCULATED.3IONS-SCNC: 9.8 MMOL/L (ref 5–15)
AST SERPL-CCNC: 20 U/L (ref 1–40)
BILIRUB SERPL-MCNC: 0.5 MG/DL (ref 0–1.2)
BUN SERPL-MCNC: 24 MG/DL (ref 6–20)
BUN/CREAT SERPL: 21.4 (ref 7–25)
CALCIUM SPEC-SCNC: 10.1 MG/DL (ref 8.6–10.5)
CHLORIDE SERPL-SCNC: 105 MMOL/L (ref 98–107)
CHOLEST SERPL-MCNC: 129 MG/DL (ref 0–200)
CO2 SERPL-SCNC: 26.2 MMOL/L (ref 22–29)
CREAT SERPL-MCNC: 1.12 MG/DL (ref 0.76–1.27)
EXPIRATION DATE: NORMAL
EXPIRATION DATE: NORMAL
GFR SERPL CREATININE-BSD FRML MDRD: 67 ML/MIN/1.73
GLOBULIN UR ELPH-MCNC: 3.3 GM/DL
GLUCOSE BLDC GLUCOMTR-MCNC: 118 MG/DL (ref 70–130)
GLUCOSE SERPL-MCNC: 110 MG/DL (ref 65–99)
HBA1C MFR BLD: 6.2 %
HDLC SERPL-MCNC: 36 MG/DL (ref 40–60)
LDLC SERPL CALC-MCNC: 64 MG/DL (ref 0–100)
LDLC/HDLC SERPL: 1.78 {RATIO}
Lab: NORMAL
Lab: NORMAL
POTASSIUM SERPL-SCNC: 4.2 MMOL/L (ref 3.5–5.2)
PROT SERPL-MCNC: 7.8 G/DL (ref 6–8.5)
SODIUM SERPL-SCNC: 141 MMOL/L (ref 136–145)
TRIGL SERPL-MCNC: 145 MG/DL (ref 0–150)
TSH SERPL DL<=0.05 MIU/L-ACNC: 2.07 UIU/ML (ref 0.27–4.2)
VLDLC SERPL-MCNC: 29 MG/DL (ref 5–40)

## 2020-09-21 PROCEDURE — 80053 COMPREHEN METABOLIC PANEL: CPT | Performed by: INTERNAL MEDICINE

## 2020-09-21 PROCEDURE — 90471 IMMUNIZATION ADMIN: CPT | Performed by: INTERNAL MEDICINE

## 2020-09-21 PROCEDURE — 99214 OFFICE O/P EST MOD 30 MIN: CPT | Performed by: INTERNAL MEDICINE

## 2020-09-21 PROCEDURE — 80061 LIPID PANEL: CPT | Performed by: INTERNAL MEDICINE

## 2020-09-21 PROCEDURE — 84443 ASSAY THYROID STIM HORMONE: CPT | Performed by: INTERNAL MEDICINE

## 2020-09-21 PROCEDURE — 20610 DRAIN/INJ JOINT/BURSA W/O US: CPT | Performed by: ORTHOPAEDIC SURGERY

## 2020-09-21 PROCEDURE — 99212 OFFICE O/P EST SF 10 MIN: CPT | Performed by: ORTHOPAEDIC SURGERY

## 2020-09-21 PROCEDURE — 90686 IIV4 VACC NO PRSV 0.5 ML IM: CPT | Performed by: INTERNAL MEDICINE

## 2020-09-21 PROCEDURE — 83036 HEMOGLOBIN GLYCOSYLATED A1C: CPT | Performed by: INTERNAL MEDICINE

## 2020-09-21 PROCEDURE — 82947 ASSAY GLUCOSE BLOOD QUANT: CPT | Performed by: INTERNAL MEDICINE

## 2020-09-21 PROCEDURE — 82306 VITAMIN D 25 HYDROXY: CPT | Performed by: INTERNAL MEDICINE

## 2020-09-21 RX ORDER — PANTOPRAZOLE SODIUM 40 MG/1
40 TABLET, DELAYED RELEASE ORAL DAILY
Qty: 90 TABLET | Refills: 3 | Status: SHIPPED | OUTPATIENT
Start: 2020-09-21 | End: 2021-12-02

## 2020-09-21 RX ORDER — FENOFIBRATE 48 MG/1
48 TABLET, COATED ORAL DAILY
Qty: 90 TABLET | Refills: 1 | Status: SHIPPED | OUTPATIENT
Start: 2020-09-21 | End: 2021-06-08

## 2020-09-21 RX ORDER — TRIAMCINOLONE ACETONIDE 40 MG/ML
40 INJECTION, SUSPENSION INTRA-ARTICULAR; INTRAMUSCULAR
Status: COMPLETED | OUTPATIENT
Start: 2020-09-21 | End: 2020-09-21

## 2020-09-21 RX ORDER — ROSUVASTATIN CALCIUM 40 MG/1
40 TABLET, COATED ORAL DAILY
Qty: 90 TABLET | Refills: 1 | Status: SHIPPED | OUTPATIENT
Start: 2020-09-21 | End: 2021-09-07

## 2020-09-21 RX ORDER — TELMISARTAN AND HYDROCHLORTHIAZIDE 80; 25 MG/1; MG/1
1 TABLET ORAL DAILY
Qty: 90 TABLET | Refills: 1 | Status: SHIPPED | OUTPATIENT
Start: 2020-09-21 | End: 2020-12-14 | Stop reason: SDUPTHER

## 2020-09-21 RX ORDER — ROPIVACAINE HYDROCHLORIDE 5 MG/ML
4 INJECTION, SOLUTION EPIDURAL; INFILTRATION; PERINEURAL
Status: COMPLETED | OUTPATIENT
Start: 2020-09-21 | End: 2020-09-21

## 2020-09-21 RX ADMIN — ROPIVACAINE HYDROCHLORIDE 4 ML: 5 INJECTION, SOLUTION EPIDURAL; INFILTRATION; PERINEURAL at 11:03

## 2020-09-21 RX ADMIN — TRIAMCINOLONE ACETONIDE 40 MG: 40 INJECTION, SUSPENSION INTRA-ARTICULAR; INTRAMUSCULAR at 11:03

## 2020-09-21 NOTE — ASSESSMENT & PLAN NOTE
Blood sugar and 90 day average sugar reviewed  Results for orders placed or performed in visit on 09/21/20   POC Glycosylated Hemoglobin (Hb A1C)    Specimen: Blood   Result Value Ref Range    Hemoglobin A1C 10,207,967 %    Lot Number [id number]     Expiration Date 04/22/2022    POC Glucose, Blood    Specimen: Blood   Result Value Ref Range    Glucose 118 70 - 130 mg/dL    Lot Number [id number]     Expiration Date 12/31/2020      Doing well overall  Continue to work on diet, exercise  Is having some higher sugars A1C higher at 6.2%

## 2020-09-21 NOTE — PROGRESS NOTES
Procedure   Large Joint Arthrocentesis: L knee  Date/Time: 9/21/2020 11:03 AM  Consent given by: patient  Site marked: site marked  Timeout: Immediately prior to procedure a time out was called to verify the correct patient, procedure, equipment, support staff and site/side marked as required   Supporting Documentation  Indications: pain   Procedure Details  Location: knee - L knee  Preparation: Patient was prepped and draped in the usual sterile fashion  Needle size: 22 G  Approach: anterolateral  Medications administered: 4 mL ropivacaine 0.5 %; 40 mg triamcinolone acetonide 40 MG/ML  Patient tolerance: patient tolerated the procedure well with no immediate complications

## 2020-09-21 NOTE — PROGRESS NOTES
Buzz Elmore 60 y.o.  CC:Follow-up, Hypertension, Hyperlipidemia, and Hyperglycemia      [place]: Follow-up, Hypertension, Hyperlipidemia, and Hyperglycemia    Is on crestor and tricor low dose   bp is good  Is on vitamin D supplement   Left synovial cyst wrist with oa changes   Bilateral knee pain- has appt Dr Castellanos - injections     Allergies   Allergen Reactions   • Atorvastatin Myalgia       Current Outpatient Medications:   •  aspirin (ASPIRIN LOW DOSE) 81 MG tablet, Take 81 mg by mouth Daily., Disp: , Rfl:   •  carvedilol (COREG) 12.5 MG tablet, TAKE ONE TABLET BY MOUTH TWICE A DAY WITH MEALS, Disp: 60 tablet, Rfl: 3  •  Cholecalciferol (VITAMIN D) 2000 UNITS capsule, Take 2,000 Units by mouth Daily., Disp: , Rfl:   •  fenofibrate (TRICOR) 48 MG tablet, Take 1 tablet by mouth Daily., Disp: 30 tablet, Rfl: 5  •  pantoprazole (PROTONIX) 40 MG EC tablet, Take 1 tablet by mouth Daily., Disp: 90 tablet, Rfl: 3  •  prasugrel (EFFIENT) 10 MG tablet, TAKE ONE TABLET BY MOUTH DAILY, Disp: 90 tablet, Rfl: 2  •  rosuvastatin (CRESTOR) 40 MG tablet, TAKE ONE TABLET BY MOUTH DAILY, Disp: 90 tablet, Rfl: 1  •  telmisartan-hydrochlorothiazide (Micardis HCT) 80-25 MG per tablet, Take 1 tablet by mouth Daily., Disp: 30 tablet, Rfl: 5  Patient Active Problem List    Diagnosis   • IHD (ischemic heart disease) [I25.9]   • Obesity [E66.9]   • GERD (gastroesophageal reflux disease) [K21.9]   • Chronic low back pain [M54.5, G89.29]   • Coagulation disorder (CMS/HCC) [D68.9]   • Benign prostatic hyperplasia with urinary obstruction [N40.1, N13.8]   • Chest pain [R07.9]   • Mechanical complication due to tissue graft [T85.698A]   • Atherosclerosis of coronary artery [I25.10]   • Hyperglycemia [R73.9]   • Gastroesophageal reflux disease [K21.9]   • Hyperlipidemia [E78.5]   • Hypertension [I10]   • Knee pain [M25.569]   • Lipoma [D17.9]   • Low back pain [M54.5]   • Calculus of kidney [N20.0]   • Muscle pain [M79.10]   • Vitamin D  deficiency [E55.9]   • Acute serous otitis media [H65.00]   • 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 Negative for appetite change, chills, diaphoresis, fatigue, fever and unexpected weight change.   HENT: Negative for congestion, dental problem, drooling, ear discharge, ear pain, facial swelling, hearing loss, mouth sores, nosebleeds, postnasal drip, rhinorrhea, sinus pressure, sneezing, sore throat, tinnitus, trouble swallowing and voice change.    Eyes: Negative for photophobia, pain, discharge, redness, itching and visual disturbance.   Respiratory: Negative for apnea, cough, choking, chest tightness, shortness of breath, wheezing and stridor.    Cardiovascular: Negative for chest pain, palpitations and leg swelling.   Gastrointestinal: Negative for abdominal distention, abdominal pain, anal bleeding, blood in stool, constipation, diarrhea, nausea, rectal pain and vomiting.   Endocrine: Negative for cold intolerance, heat intolerance, polydipsia, polyphagia and polyuria.   Genitourinary: Negative for decreased urine volume, difficulty urinating, dysuria, enuresis, flank pain, frequency, genital sores, hematuria and urgency.   Musculoskeletal: Positive for arthralgias and gait problem. Negative for back pain, joint swelling, myalgias, neck pain and neck stiffness.   Skin: Negative for color change, pallor, rash and wound.   Allergic/Immunologic: Negative for environmental allergies, food allergies and immunocompromised state.   Neurological: Negative for dizziness, tremors, seizures, syncope, facial asymmetry, speech difficulty, weakness, light-headedness, numbness and headaches.   Hematological: Negative for adenopathy. Does not bruise/bleed easily.   Psychiatric/Behavioral: Negative for agitation, behavioral problems, confusion, decreased  "concentration, dysphoric mood, hallucinations, self-injury, sleep disturbance and suicidal ideas. The patient is not nervous/anxious and is not hyperactive.      Social History     Socioeconomic History   • Marital status:      Spouse name: Not on file   • Number of children: Not on file   • Years of education: Not on file   • Highest education level: Not on file   Tobacco Use   • Smoking status: Never Smoker   • Smokeless tobacco: Never Used   Substance and Sexual Activity   • Alcohol use: Yes     Comment: occasional   • Drug use: No   • Sexual activity: Defer     Family History   Problem Relation Age of Onset   • Breast cancer Other    • Colon cancer Other    • Heart disease Other    • Breast cancer Mother    • Heart disease Mother    • Hypertension Mother    • Heart attack Mother    • Colon cancer Father    • Hypertension Father    • Heart attack Father    • Diabetes Father    • Heart disease Paternal Grandfather      /72   Pulse 62   Ht 185.4 cm (73\")   Wt 115 kg (254 lb)   SpO2 98%   BMI 33.51 kg/m²   Physical Exam  Vitals signs and nursing note reviewed.   Constitutional:       Appearance: He is well-developed.   HENT:      Head: Normocephalic and atraumatic.   Eyes:      General: Lids are normal.      Conjunctiva/sclera: Conjunctivae normal.      Pupils: Pupils are equal, round, and reactive to light.   Neck:      Musculoskeletal: Normal range of motion and neck supple.      Thyroid: No thyroid mass or thyromegaly.      Vascular: No carotid bruit.      Trachea: Trachea normal. No tracheal deviation.   Cardiovascular:      Rate and Rhythm: Normal rate and regular rhythm.      Heart sounds: Normal heart sounds. No murmur. No friction rub. No gallop.    Pulmonary:      Effort: Pulmonary effort is normal. No respiratory distress.      Breath sounds: Normal breath sounds. No wheezing.   Musculoskeletal: Normal range of motion.         General: No deformity.      Right foot: Normal range of " motion. No deformity, bunion, Charcot foot or foot drop.      Left foot: Normal range of motion. No deformity, bunion, Charcot foot or foot drop.   Feet:      Right foot:      Skin integrity: No ulcer, blister, skin breakdown or callus.      Left foot:      Skin integrity: No ulcer, blister, skin breakdown or callus.   Lymphadenopathy:      Cervical: No cervical adenopathy.   Skin:     General: Skin is warm and dry.      Findings: No erythema or rash.      Nails: There is no clubbing.     Neurological:      Mental Status: He is alert and oriented to person, place, and time.      Cranial Nerves: No cranial nerve deficit.      Deep Tendon Reflexes: Reflexes are normal and symmetric. Reflexes normal.   Psychiatric:         Speech: Speech normal.         Behavior: Behavior normal.         Thought Content: Thought content normal.         Judgment: Judgment normal.       Results for orders placed or performed in visit on 12/09/19   Comprehensive Metabolic Panel    Specimen: Blood   Result Value Ref Range    Glucose 107 (H) 65 - 99 mg/dL    BUN 23 (H) 6 - 20 mg/dL    Creatinine 1.02 0.76 - 1.27 mg/dL    Sodium 140 136 - 145 mmol/L    Potassium 4.4 3.5 - 5.2 mmol/L    Chloride 102 98 - 107 mmol/L    CO2 26.6 22.0 - 29.0 mmol/L    Calcium 10.2 8.6 - 10.5 mg/dL    Total Protein 7.4 6.0 - 8.5 g/dL    Albumin 4.00 3.50 - 5.20 g/dL    ALT (SGPT) 23 1 - 41 U/L    AST (SGOT) 27 1 - 40 U/L    Alkaline Phosphatase 40 39 - 117 U/L    Total Bilirubin 0.7 0.2 - 1.2 mg/dL    eGFR Non African Amer 75 >60 mL/min/1.73    Globulin 3.4 gm/dL    A/G Ratio 1.2 g/dL    BUN/Creatinine Ratio 22.5 7.0 - 25.0    Anion Gap 11.4 5.0 - 15.0 mmol/L   Lipid Panel    Specimen: Blood   Result Value Ref Range    Total Cholesterol 143 0 - 200 mg/dL    Triglycerides 124 0 - 150 mg/dL    HDL Cholesterol 37 (L) 40 - 60 mg/dL    LDL Cholesterol  81 0 - 100 mg/dL    VLDL Cholesterol 24.8 5 - 40 mg/dL    LDL/HDL Ratio 2.19    TSH    Specimen: Blood   Result Value  Ref Range    TSH 1.550 0.270 - 4.200 uIU/mL   T4, Free    Specimen: Blood   Result Value Ref Range    Free T4 1.34 0.93 - 1.70 ng/dL   Vitamin D 25 Hydroxy    Specimen: Blood   Result Value Ref Range    25 Hydroxy, Vitamin D 19.6 (L) 30.0 - 100.0 ng/ml   POC Glycosylated Hemoglobin (Hb A1C)    Specimen: Blood   Result Value Ref Range    Hemoglobin A1C 6.1 %   POC Glucose Fingerstick    Specimen: Blood   Result Value Ref Range    Glucose 126 70 - 130 mg/dL     Buzz was seen today for follow-up, hypertension, hyperlipidemia and hyperglycemia.    Diagnoses and all orders for this visit:    Hyperglycemia  -     POC Glycosylated Hemoglobin (Hb A1C)  -     POC Glucose, Blood      Problem List Items Addressed This Visit        Cardiovascular and Mediastinum    Hypertension     bp is well controlled today   Continue current medications          Relevant Medications    telmisartan-hydrochlorothiazide (Micardis HCT) 80-25 MG per tablet    Hyperlipidemia     On crestor 40 mg daily and low fat diet  Check flp          Relevant Medications    fenofibrate (TRICOR) 48 MG tablet    rosuvastatin (CRESTOR) 40 MG tablet    Other Relevant Orders    Lipid Panel    TSH       Digestive    Vitamin D deficiency     Is on vitamin D supplement   Update levels          Relevant Orders    Vitamin D 25 Hydroxy       Other    Hyperglycemia - Primary     Blood sugar and 90 day average sugar reviewed  Results for orders placed or performed in visit on 09/21/20   POC Glycosylated Hemoglobin (Hb A1C)    Specimen: Blood   Result Value Ref Range    Hemoglobin A1C 10,207,967 %    Lot Number [id number]     Expiration Date 04/22/2022    POC Glucose, Blood    Specimen: Blood   Result Value Ref Range    Glucose 118 70 - 130 mg/dL    Lot Number [id number]     Expiration Date 12/31/2020      Doing well overall  Continue to work on diet, exercise  Is having some higher sugars A1C higher at 6.2%         Relevant Orders    POC Glycosylated Hemoglobin (Hb A1C)  (Completed)    POC Glucose, Blood (Completed)    Comprehensive Metabolic Panel        Return in about 6 months (around 3/21/2021) for Recheck.    Mayte Herrera MA  Signed Maida Rey MD

## 2020-09-21 NOTE — PROGRESS NOTES
[facility] Note    Subjective     Chief Complaint   Patient presents with   • Follow-up     11 month follow up; Primary osteoarthritis of left knee -last injection given on 10/7/19        HPI    It has been 11  month(s) since Mr. Elmore's last visit. He returns to clinic today for follow-up of left knee arthritis. He rates his pain a 8/10 on the pain scale. Previous/current treatments: bracing. Current symptoms: pain. The pain is worse with walking and working; ice improve the pain. Overall, he is doing the same.  He would like to have an injection today.  He is not interested in surgical intervention at this time.  Symptoms have been present for several years now.    I have reviewed the following portions of the patient's history and agree with: History of Present Illness and Review of Systems    Patient Active Problem List   Diagnosis   • Coagulation disorder (CMS/HCC)   • Benign prostatic hyperplasia with urinary obstruction   • Chest pain   • Mechanical complication due to tissue graft   • Atherosclerosis of coronary artery   • Hyperglycemia   • Gastroesophageal reflux disease   • Hyperlipidemia   • Hypertension   • Knee pain   • Lipoma   • Low back pain   • Calculus of kidney   • Muscle pain   • Vitamin D deficiency   • IHD (ischemic heart disease)   • Obesity   • GERD (gastroesophageal reflux disease)   • Chronic low back pain   • Acute serous otitis media   • Mixed conductive and sensorineural hearing loss   • Bilateral sensorineural hearing loss   • Otitis media of left ear     Past Medical History:   Diagnosis Date   • Chronic low back pain    • GERD (gastroesophageal reflux disease)    • Hyperlipidemia    • Hypertension    • IHD (ischemic heart disease)    • Obesity       Past Surgical History:   Procedure Laterality Date   • CORONARY STENT PLACEMENT     • HAND SURGERY      skin graft on finger   • KNEE ARTHROTOMY      History of Arthrotomy Of Knee With Medial Meniscectomy   •  KNEE SURGERY Right     meniscus      Family History   Problem Relation Age of Onset   • Breast cancer Other    • Colon cancer Other    • Heart disease Other    • Breast cancer Mother    • Heart disease Mother    • Hypertension Mother    • Heart attack Mother    • Colon cancer Father    • Hypertension Father    • Heart attack Father    • Diabetes Father    • Heart disease Paternal Grandfather      Social History     Socioeconomic History   • Marital status:      Spouse name: Not on file   • Number of children: Not on file   • Years of education: Not on file   • Highest education level: Not on file   Tobacco Use   • Smoking status: Never Smoker   • Smokeless tobacco: Never Used   Substance and Sexual Activity   • Alcohol use: Yes     Comment: occasional   • Drug use: No   • Sexual activity: Defer      Current Outpatient Medications on File Prior to Visit   Medication Sig Dispense Refill   • aspirin (ASPIRIN LOW DOSE) 81 MG tablet Take 81 mg by mouth Daily.     • carvedilol (COREG) 12.5 MG tablet TAKE ONE TABLET BY MOUTH TWICE A DAY WITH MEALS 60 tablet 3   • Cholecalciferol (VITAMIN D) 2000 UNITS capsule Take 2,000 Units by mouth Daily.     • fenofibrate (TRICOR) 48 MG tablet Take 1 tablet by mouth Daily. 30 tablet 5   • pantoprazole (PROTONIX) 40 MG EC tablet Take 1 tablet by mouth Daily. 90 tablet 3   • prasugrel (EFFIENT) 10 MG tablet TAKE ONE TABLET BY MOUTH DAILY 90 tablet 2   • rosuvastatin (CRESTOR) 40 MG tablet TAKE ONE TABLET BY MOUTH DAILY 90 tablet 1   • telmisartan-hydrochlorothiazide (Micardis HCT) 80-25 MG per tablet Take 1 tablet by mouth Daily. 30 tablet 5     No current facility-administered medications on file prior to visit.       Allergies   Allergen Reactions   • Atorvastatin Myalgia        Review of Systems   Constitutional: Negative.    HENT: Negative.    Eyes: Negative.    Respiratory: Negative.    Cardiovascular: Negative.    Gastrointestinal: Negative.    Endocrine: Negative.     "  Genitourinary: Negative.    Musculoskeletal: Positive for arthralgias.   Skin: Negative.    Allergic/Immunologic: Negative.    Neurological: Negative.    Hematological: Negative.    Psychiatric/Behavioral: Negative.         Objective      Physical Exam  Pulse 65   Ht 185.4 cm (72.99\")   Wt 115 kg (253 lb 8.5 oz)   SpO2 98%   BMI 33.46 kg/m²     Body mass index is 33.46 kg/m².    General:   Mental Status:  Alert   Appearance: Cooperative, in no acute distress   Build and Nutrition: Overweight male   Orientation: Alert and oriented to person, place and time   Posture: Normal   Gait: Normal    Integument:              Left knee: No skin lesions, no rash, no ecchymosis     Lower Extremities:              Left Knee:                          Tenderness:    Medial joint line tenderness                          Effusion:          None                          Swelling:          None                          Crepitus:          Positive                          Range of motion:        Extension:       0°                                                              Flexion:           120°  Instability:        No varus laxity, no valgus laxity, negative anterior drawer  Deformities:     Varus    Imaging/Studies  Imaging Results (Last 24 Hours)     Procedure Component Value Units Date/Time    XR Knee 4+ View Left [[id number]] Resulted: 09/21/20 1058     Updated: 09/21/20 1059    Narrative:      Left Knee Radiographs  Indication: left knee pain  Views: Standing AP's and skiers of both knees, with lateral and sunrise   views of the left knee    Comparison: 4/8/2019    Findings:   Bone-on-bone contact in the medial compartment, tricompartmental   osteophytes, mild varus alignment, no acute bony abnormalities.  No   unusual bony features.  No significant worsening compared to the previous   films.            Assessment and Plan     Buzz was seen today for follow-up.    Diagnoses and all orders for this visit:    Primary " osteoarthritis of left knee  -     XR Knee 4+ View Left  -     Large Joint Arthrocentesis: L knee        1. Primary osteoarthritis of left knee        I reviewed my findings with the patient today.  Left knee continues to bother him, and he is not yet interested in surgical intervention.  He would like to have an injection, and this was provided today.  I will see him back in 4 months, but sooner for any problems.    Procedure Note:  The potential benefits of performing a therapeutic left knee joint injection, as well as potential risks (including, but not limited to infection, swelling, pain, bleeding, bruising, nerve/blood vessel damage, skin color changes, transient elevation in blood glucose levels, and fat atrophy) were discussed with the patient.  After informed consent, timeout procedure was performed, and the skin on the left knee was prepped with chlorhexidine soap and alcohol, after which ethyl chloride was applied to the skin at the injection site. Via the anterolateral approach, 1ml of Kenalog 40mg/ml mixed with 4ml 0.5% ropivacaine plain was injected into the knee joint.  The patient tolerated the procedure well, experiencing 80% improvement a few minutes following the injection. There were no complications.  Band-Aid was applied to the injection site. Post-procedural instructions were given to the patient and/or their caregiver.      Return in about 4 months (around 1/21/2021).    Medical Decision Making  Management Options : prescription/IM medicine  Data/Risk: radiology tests and independent visualization of imaging, lab tests, or EMG/NCV      Prince Castellanos MD  09/21/20  11:11 EDT    Dragon disclaimer:  Much of this encounter note is an electronic transcription/translation of spoken language to printed text. The electronic translation of spoken language may permit erroneous, or at times, nonsensical words or phrases to be inadvertently transcribed; Although I have reviewed the note for such  errors, some may still exist.

## 2020-09-30 DIAGNOSIS — I10 ESSENTIAL HYPERTENSION: ICD-10-CM

## 2020-09-30 RX ORDER — TELMISARTAN AND HYDROCHLORTHIAZIDE 80; 25 MG/1; MG/1
1 TABLET ORAL DAILY
Qty: 30 TABLET | OUTPATIENT
Start: 2020-09-30 | End: 2021-09-30

## 2020-10-14 RX ORDER — CARVEDILOL 12.5 MG/1
TABLET ORAL
Qty: 60 TABLET | Refills: 6 | Status: SHIPPED | OUTPATIENT
Start: 2020-10-14 | End: 2021-05-10

## 2020-12-07 ENCOUNTER — TELEPHONE (OUTPATIENT)
Dept: ENDOCRINOLOGY | Facility: CLINIC | Age: 60
End: 2020-12-07

## 2020-12-07 NOTE — TELEPHONE ENCOUNTER
PT'S WIFE CALLED STATING THAT THIS PT HAS HAD A KNOT COME UP NEAR HIS BELLY BUTTON. SHE WANTED TO SPEAK W/ SOMEONE ABOUT THAT AT OUR EARLIEST CONVENIENCE. THANK YOU

## 2020-12-11 NOTE — PROGRESS NOTES
[facility] Cardiology  Subjective:     Encounter Date: 12/14/2020      Patient ID: Buzz Elmore is a 60 y.o. male.    Chief Complaint: IHD (ischemic heart disease)      PROBLEM LIST:  1. Ischemic heart disease  a. [facility] for ACS, 07/28/2011: PTCA/PCI to proximal RCA. EF 60%.  b. Stress Test, 12/02/2016: EF > 70%. No ischemia  2. Hyperlipidemia  3. Hypertension  4. Chronic low back pain  5. GERD  6. Obesity    History of Present Illness  Buzz Elmore returns today for annual follow up with a history of ischemic heart disease and cardiac risk factors. Since last visit, he has been feeling well overall from a cardiovascular standpoint. He went hunting this year and did not have any cardiopulmonary limitations with walking and hiking. 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 He has not had any medication changes. He notes he was tested for COVID last week due to congestion and nasal drainage, but the test was negative. His father unfortunately passed within the past year. Patient denies chest pain, shortness of breath, palpitations, edema, dizziness, and syncope.     Allergies   Allergen Reactions   • Atorvastatin Myalgia         Current Outpatient Medications:   •  aspirin (ASPIRIN LOW DOSE) 81 MG tablet, Take 81 mg by mouth Daily., Disp: , Rfl:   •  carvedilol (COREG) 12.5 MG tablet, TAKE ONE TABLET BY MOUTH TWICE A DAY WITH MEALS, Disp: 60 tablet, Rfl: 6  •  Cholecalciferol (VITAMIN D) 2000 UNITS capsule, Take 2,000 Units by mouth Daily., Disp: , Rfl:   •  fenofibrate (TRICOR) 48 MG tablet, Take 1 tablet by mouth Daily., Disp: 90 tablet, Rfl: 1  •  pantoprazole (PROTONIX) 40 MG EC tablet, Take 1 tablet by mouth Daily., Disp: 90 tablet, Rfl: 3  •  prasugrel (EFFIENT) 10 MG tablet, TAKE ONE TABLET BY MOUTH DAILY, Disp: 90 tablet, Rfl: 2  •  rosuvastatin (CRESTOR) 40 MG tablet, Take 1 tablet by mouth Daily., Disp: 90 tablet, Rfl: 1  •  telmisartan-hydrochlorothiazide (Micardis HCT)  "80-25 MG per tablet, Take 1 tablet by mouth Daily., Disp: 90 tablet, Rfl: 1    The following portions of the patient's history were reviewed and updated as appropriate: allergies, current medications, past family history, past medical history, past social history, past surgical history and problem list.    Review of Systems   Constitution: Negative.   HENT: Positive for congestion.    Cardiovascular: Negative for chest pain, dyspnea on exertion, leg swelling, palpitations and syncope.   Respiratory: Negative.  Negative for shortness of breath.    Hematologic/Lymphatic: Negative for bleeding problem. Does not bruise/bleed easily.   Skin: Negative for rash.   Musculoskeletal: Negative for muscle weakness and myalgias.   Gastrointestinal: Negative for heartburn, nausea and vomiting.   Neurological: Negative for dizziness, light-headedness, loss of balance and numbness.   All other systems reviewed and are negative.         Objective:     Vitals:    12/14/20 0853   BP: 126/84   BP Location: Right arm   Patient Position: Sitting   Cuff Size: Adult   Pulse: 65   SpO2: 96%   Weight: 119 kg (262 lb)   Height: 185.4 cm (73\")         Constitutional:       Appearance: Well-developed.   Neck:      Thyroid: No thyromegaly.      Vascular: No carotid bruit or JVD.   Pulmonary:      Breath sounds: Normal breath sounds.   Cardiovascular:      Regular rhythm.      No gallop. No S3 and S4 gallop.   Abdominal:      General: Bowel sounds are normal.      Palpations: Abdomen is soft. There is no abdominal mass.      Tenderness: There is no abdominal tenderness.   Skin:     General: Skin is warm and dry.      Findings: No rash.   Neurological:      Mental Status: Alert and oriented to person, place, and time.         Lab Review:  Lab Results   Component Value Date    GLUCOSE 110 (H) 09/21/2020    BUN 24 (H) 09/21/2020    CREATININE 1.12 09/21/2020    EGFRIFNONA 67 09/21/2020    BCR 21.4 09/21/2020    K 4.2 09/21/2020    CO2 26.2 " 09/21/2020    CALCIUM 10.1 09/21/2020    ALBUMIN 4.50 09/21/2020    ALKPHOS 46 09/21/2020    AST 20 09/21/2020    ALT 21 09/21/2020     Lab Results   Component Value Date    CHOL 129 09/21/2020    TRIG 145 09/21/2020    HDL 36 (L) 09/21/2020    LDL 64 09/21/2020      Lab Results   Component Value Date    WBC 8.78 11/26/2018    RBC 4.81 11/26/2018    HGB 15.1 11/26/2018    HCT 44.9 11/26/2018    MCV 93.3 11/26/2018     11/26/2018     Lab Results   Component Value Date    TSH 2.070 09/21/2020     Lab Results   Component Value Date    HGBA1C 6.2 09/21/2020        Procedures        Assessment:   Diagnoses and all orders for this visit:    1. IHD (ischemic heart disease) (Primary)    2. Essential hypertension    3. Mixed hyperlipidemia        Impression:  1. IHD: Stable and asymptomatic. Continue aspirin and Effient.  2. Hypertension: Well controlled. Continue Coreg and telmisartan-HCTZ.  3. Hyperlipidemia: Well controlled. Discuss discontinuing fenofibrate with Dr. Rey and plan to continue to monitor FLP.  Last LDL of 64 with normal triglycerides.  4. Diabetes, well controlled hemoglobin A1c is 6.2    Plan:  1. Stable cardiac status.  2. Discuss discontinuing fenofibrate with Dr. Rey, and if okay, would simply discontinue this medicine, continue statin, and recheck fasting lipid profile in 6 months.  3. Continue other current medications.  4. Revisit in 12 MO, or sooner as needed.    Scribed for Db Kitchen MD by Arben Varela. 12/14/2020  09:31 EST       Db Kitchen MD      Please note that portions of this note may have been completed with a voice recognition program. Efforts were made to edit the dictations, but occasionally words are mistranscribed.

## 2020-12-14 ENCOUNTER — OFFICE VISIT (OUTPATIENT)
Dept: ENDOCRINOLOGY | Facility: CLINIC | Age: 60
End: 2020-12-14

## 2020-12-14 ENCOUNTER — OFFICE VISIT (OUTPATIENT)
Dept: CARDIOLOGY | Facility: CLINIC | Age: 60
End: 2020-12-14

## 2020-12-14 VITALS
OXYGEN SATURATION: 96 % | WEIGHT: 262 LBS | SYSTOLIC BLOOD PRESSURE: 126 MMHG | DIASTOLIC BLOOD PRESSURE: 84 MMHG | HEART RATE: 65 BPM | BODY MASS INDEX: 34.72 KG/M2 | HEIGHT: 73 IN

## 2020-12-14 VITALS
DIASTOLIC BLOOD PRESSURE: 82 MMHG | SYSTOLIC BLOOD PRESSURE: 128 MMHG | HEART RATE: 60 BPM | BODY MASS INDEX: 35.57 KG/M2 | HEIGHT: 72 IN | OXYGEN SATURATION: 99 % | WEIGHT: 262.6 LBS

## 2020-12-14 DIAGNOSIS — R19.02 LEFT UPPER QUADRANT ABDOMINAL MASS: Primary | ICD-10-CM

## 2020-12-14 DIAGNOSIS — E78.2 MIXED HYPERLIPIDEMIA: ICD-10-CM

## 2020-12-14 DIAGNOSIS — I10 ESSENTIAL HYPERTENSION: ICD-10-CM

## 2020-12-14 DIAGNOSIS — I25.9 IHD (ISCHEMIC HEART DISEASE): Primary | ICD-10-CM

## 2020-12-14 PROCEDURE — 99213 OFFICE O/P EST LOW 20 MIN: CPT | Performed by: INTERNAL MEDICINE

## 2020-12-14 RX ORDER — TELMISARTAN AND HYDROCHLORTHIAZIDE 80; 25 MG/1; MG/1
1 TABLET ORAL DAILY
Qty: 90 TABLET | Refills: 3 | Status: SHIPPED | OUTPATIENT
Start: 2020-12-14 | End: 2021-12-13

## 2020-12-14 NOTE — PROGRESS NOTES
Buzz Elmore 60 y.o.  [facility]:Lump in left mid abdomen area x1 month (started as a bruise)      [place]: Lump in left mid abdomen area x1 month (started as a bruise)    Started as bruise- not painful   Noted about a month ago   Not growing   No recent injections in this area   Is on effient  Area approx 4-5 cm from navel, approx 3 cm   Firm and non tender    Allergies   Allergen Reactions   • Atorvastatin Myalgia       Current Outpatient Medications:   •  aspirin (ASPIRIN LOW DOSE) 81 MG tablet, Take 81 mg by mouth Daily., Disp: , Rfl:   •  carvedilol (COREG) 12.5 MG tablet, TAKE ONE TABLET BY MOUTH TWICE A DAY WITH MEALS, Disp: 60 tablet, Rfl: 6  •  Cholecalciferol (VITAMIN D) 2000 UNITS capsule, Take 2,000 Units by mouth Daily., Disp: , Rfl:   •  fenofibrate (TRICOR) 48 MG tablet, Take 1 tablet by mouth Daily., Disp: 90 tablet, Rfl: 1  •  pantoprazole (PROTONIX) 40 MG EC tablet, Take 1 tablet by mouth Daily., Disp: 90 tablet, Rfl: 3  •  prasugrel (EFFIENT) 10 MG tablet, TAKE ONE TABLET BY MOUTH DAILY, Disp: 90 tablet, Rfl: 2  •  rosuvastatin (CRESTOR) 40 MG tablet, Take 1 tablet by mouth Daily., Disp: 90 tablet, Rfl: 1  •  telmisartan-hydrochlorothiazide (Micardis HCT) 80-25 MG per tablet, Take 1 tablet by mouth Daily., Disp: 90 tablet, Rfl: 1  Patient Active Problem List    Diagnosis   • IHD (ischemic heart disease) [I25.9]   • Obesity [E66.9]   • GERD (gastroesophageal reflux disease) [K21.9]   • Chronic low back pain [M54.5, G89.29]   • Coagulation disorder (CMS/HCC) [D68.9]   • Benign prostatic hyperplasia with urinary obstruction [N40.1, N13.8]   • Chest pain [R07.9]   • Mechanical complication due to tissue graft [T85.698A]   • Atherosclerosis of coronary artery [I25.10]   • Hyperglycemia [R73.9]   • Gastroesophageal reflux disease [K21.9]   • Hyperlipidemia [E78.5]   • Hypertension [I10]   • Knee pain [M25.569]   • Lipoma [D17.9]   • Low back pain [M54.5]   • Calculus of kidney [N20.0]   • Muscle pain [M79.10]  "  • Vitamin D deficiency [E55.9]   • Acute serous otitis media [H65.00]   • Mixed conductive and sensorineural hearing loss [H90.8]   • Bilateral sensorineural hearing loss [H90.3]   • Otitis media of left ear [H66.92]     Review of Systems   Constitutional: Negative for fatigue and unexpected weight change.   HENT: Negative for congestion and rhinorrhea.    Cardiovascular: Negative for palpitations and leg swelling.   Gastrointestinal: Negative for constipation and diarrhea.   Genitourinary: Negative for dysuria and flank pain.   Musculoskeletal: Positive for arthralgias. Negative for gait problem.   Skin:        Mass - left abdominal area    Neurological: Negative for dizziness and light-headedness.     Social History     Socioeconomic History   • Marital status:      Spouse name: Not on file   • Number of children: Not on file   • Years of education: Not on file   • Highest education level: Not on file   Tobacco Use   • Smoking status: Never Smoker   • Smokeless tobacco: Never Used   Substance and Sexual Activity   • Alcohol use: Yes     Comment: occasional   • Drug use: No   • Sexual activity: Defer     Family History   Problem Relation Age of Onset   • Breast cancer Other    • Colon cancer Other    • Heart disease Other    • Breast cancer Mother    • Heart disease Mother    • Hypertension Mother    • Heart attack Mother    • Colon cancer Father    • Hypertension Father    • Heart attack Father    • Diabetes Father    • Heart disease Paternal Grandfather    • Heart disease Brother      /82   Pulse 60   Ht 182.9 cm (72\")   Wt 119 kg (262 lb 9.6 oz)   SpO2 99%   BMI 35.61 kg/m²   Physical Exam  Vitals signs and nursing note reviewed.   Constitutional:       Appearance: Normal appearance.   HENT:      Head: Normocephalic and atraumatic.   Eyes:      Extraocular Movements: Extraocular movements intact.      Pupils: Pupils are equal, round, and reactive to light.   Cardiovascular:      Rate and " Rhythm: Normal rate and regular rhythm.   Pulmonary:      Effort: Pulmonary effort is normal.      Breath sounds: Normal breath sounds.   Abdominal:      Palpations: There is mass (under skin- left mid abdomen approx 3-4 cm ).       Neurological:      Mental Status: He is alert.       Results for orders placed or performed in visit on 09/21/20   Comprehensive Metabolic Panel    Specimen: Blood   Result Value Ref Range    Glucose 110 (H) 65 - 99 mg/dL    BUN 24 (H) 6 - 20 mg/dL    Creatinine 1.12 0.76 - 1.27 mg/dL    Sodium 141 136 - 145 mmol/L    Potassium 4.2 3.5 - 5.2 mmol/L    Chloride 105 98 - 107 mmol/L    CO2 26.2 22.0 - 29.0 mmol/L    Calcium 10.1 8.6 - 10.5 mg/dL    Total Protein 7.8 6.0 - 8.5 g/dL    Albumin 4.50 3.50 - 5.20 g/dL    ALT (SGPT) 21 1 - 41 U/L    AST (SGOT) 20 1 - 40 U/L    Alkaline Phosphatase 46 39 - 117 U/L    Total Bilirubin 0.5 0.0 - 1.2 mg/dL    eGFR Non African Amer 67 >60 mL/min/1.73    Globulin 3.3 gm/dL    A/G Ratio 1.4 g/dL    BUN/Creatinine Ratio 21.4 7.0 - 25.0    Anion Gap 9.8 5.0 - 15.0 mmol/L   Lipid Panel    Specimen: Blood   Result Value Ref Range    Total Cholesterol 129 0 - 200 mg/dL    Triglycerides 145 0 - 150 mg/dL    HDL Cholesterol 36 (L) 40 - 60 mg/dL    LDL Cholesterol  64 0 - 100 mg/dL    VLDL Cholesterol 29 5 - 40 mg/dL    LDL/HDL Ratio 1.78    TSH    Specimen: Blood   Result Value Ref Range    TSH 2.070 0.270 - 4.200 uIU/mL   Vitamin D 25 Hydroxy    Specimen: Blood   Result Value Ref Range    25 Hydroxy, Vitamin D 42.7 30.0 - 100.0 ng/ml   POC Glycosylated Hemoglobin (Hb A1C)    Specimen: Blood   Result Value Ref Range    Hemoglobin A1C 6.2 %    Lot Number [id number]     Expiration Date 04/22/2022    POC Glucose, Blood    Specimen: Blood   Result Value Ref Range    Glucose 118 70 - 130 mg/dL    Lot Number [id number]     Expiration Date 12/31/2020      Problems Addressed this Visit        Other    Left upper quadrant abdominal mass - Primary     approx 3 cm- firm  sub dermal   Immobile   No pain   Is on effient and asa  Check CT abdomen for evaluation of this area   No recent injections         Relevant Orders    CT Abdomen Without Contrast      Diagnoses       Codes Comments    Left upper quadrant abdominal mass    -  Primary ICD-10-CM: R19.02  ICD-9-CM: 789.32         Return if symptoms worsen or fail to improve, for Recheck.    Mayte Herrera MA  Signed Maida Rey MD

## 2020-12-14 NOTE — ASSESSMENT & PLAN NOTE
approx 3 cm- firm sub dermal   Immobile   No pain   Is on effient and asa  Check CT abdomen for evaluation of this area   No recent injections

## 2020-12-15 ENCOUNTER — TELEPHONE (OUTPATIENT)
Dept: ENDOCRINOLOGY | Facility: CLINIC | Age: 60
End: 2020-12-15

## 2020-12-15 NOTE — TELEPHONE ENCOUNTER
[facility] CALLED STATING THAT THEY DO NOT HAVE AN ORDER FOR THIS PT TO HAVE AN ABDOMINAL CT W/O CONTRAST. PLEASE CALL THEM  [phone] ASK FOR REGISTRATION OR FAX ORDER TO FAX # [phone]. JOSE CALLED. THANK YOU

## 2021-01-25 RX ORDER — PRASUGREL 10 MG/1
TABLET, FILM COATED ORAL
Qty: 90 TABLET | Refills: 3 | Status: SHIPPED | OUTPATIENT
Start: 2021-01-25 | End: 2022-01-24

## 2021-02-26 ENCOUNTER — TELEPHONE (OUTPATIENT)
Dept: ENDOCRINOLOGY | Facility: CLINIC | Age: 61
End: 2021-02-26

## 2021-02-26 ENCOUNTER — OFFICE VISIT (OUTPATIENT)
Dept: ENDOCRINOLOGY | Facility: CLINIC | Age: 61
End: 2021-02-26

## 2021-02-26 VITALS
SYSTOLIC BLOOD PRESSURE: 186 MMHG | OXYGEN SATURATION: 98 % | DIASTOLIC BLOOD PRESSURE: 100 MMHG | HEART RATE: 62 BPM | BODY MASS INDEX: 37.32 KG/M2 | HEIGHT: 71 IN | WEIGHT: 266.6 LBS

## 2021-02-26 DIAGNOSIS — R19.02 LEFT UPPER QUADRANT ABDOMINAL MASS: Primary | ICD-10-CM

## 2021-02-26 DIAGNOSIS — I10 ESSENTIAL HYPERTENSION: ICD-10-CM

## 2021-02-26 PROCEDURE — 99214 OFFICE O/P EST MOD 30 MIN: CPT | Performed by: INTERNAL MEDICINE

## 2021-02-26 RX ORDER — SULFAMETHOXAZOLE AND TRIMETHOPRIM 800; 160 MG/1; MG/1
1 TABLET ORAL 2 TIMES DAILY
Qty: 20 TABLET | Refills: 0 | Status: SHIPPED | OUTPATIENT
Start: 2021-02-26 | End: 2021-03-11 | Stop reason: SDUPTHER

## 2021-02-26 NOTE — PROGRESS NOTES
Buzz BAH Catarina 60 y.o.  CC:Follow-up and left abdominal fatty tissue area (some reddness, heat and soreness)      [place]: Follow-up and left abdominal fatty tissue area (some reddness, heat and soreness)    Area left mid abdomin more tender, some fluctuance superficially   Denies laceration or puncture  No spider bite he can recall  Recheck bp 148/84  Has been getting firewood  Area itches as well   Using topical neosporin     Allergies   Allergen Reactions   • Atorvastatin Myalgia       Current Outpatient Medications:   •  aspirin (ASPIRIN LOW DOSE) 81 MG tablet, Take 81 mg by mouth Daily., Disp: , Rfl:   •  carvedilol (COREG) 12.5 MG tablet, TAKE ONE TABLET BY MOUTH TWICE A DAY WITH MEALS, Disp: 60 tablet, Rfl: 6  •  Cholecalciferol (VITAMIN D) 2000 UNITS capsule, Take 2,000 Units by mouth Daily., Disp: , Rfl:   •  fenofibrate (TRICOR) 48 MG tablet, Take 1 tablet by mouth Daily., Disp: 90 tablet, Rfl: 1  •  pantoprazole (PROTONIX) 40 MG EC tablet, Take 1 tablet by mouth Daily., Disp: 90 tablet, Rfl: 3  •  prasugrel (EFFIENT) 10 MG tablet, TAKE ONE TABLET BY MOUTH DAILY, Disp: 90 tablet, Rfl: 3  •  rosuvastatin (CRESTOR) 40 MG tablet, Take 1 tablet by mouth Daily., Disp: 90 tablet, Rfl: 1  •  telmisartan-hydrochlorothiazide (Micardis HCT) 80-25 MG per tablet, Take 1 tablet by mouth Daily., Disp: 90 tablet, Rfl: 3  Patient Active Problem List    Diagnosis   • Left upper quadrant abdominal mass [R19.02]   • IHD (ischemic heart disease) [I25.9]   • Obesity [E66.9]   • GERD (gastroesophageal reflux disease) [K21.9]   • Chronic low back pain [M54.5, G89.29]   • Coagulation disorder (CMS/HCC) [D68.9]   • Benign prostatic hyperplasia with urinary obstruction [N40.1, N13.8]   • Chest pain [R07.9]   • Mechanical complication due to tissue graft [T85.698A]   • Atherosclerosis of coronary artery [I25.10]   • Hyperglycemia [R73.9]   • Gastroesophageal reflux disease [K21.9]   • Hyperlipidemia [E78.5]   • Hypertension [I10]   •  "Knee pain [M25.569]   • Lipoma [D17.9]   • Low back pain [M54.5]   • Calculus of kidney [N20.0]   • Muscle pain [M79.10]   • Vitamin D deficiency [E55.9]   • Acute serous otitis media [H65.00]   • 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 Negative for fatigue.   HENT: Negative for congestion and rhinorrhea.    Respiratory: Negative for cough and shortness of breath.    Cardiovascular: Negative for chest pain and leg swelling.   Gastrointestinal: Positive for abdominal pain (left mid abdomen assoc with superficial swelling ).   Skin: Positive for wound.        approx 4 x 2 cm area of redness and induration with 2 separate areas   Lower lateral area with blistering and some fluctuance   Some streaking redness toward pelvis  No other skin lesions abdomen or back   C/o itching around site      Social History     Socioeconomic History   • Marital status:      Spouse name: Not on file   • Number of children: Not on file   • Years of education: Not on file   • Highest education level: Not on file   Tobacco Use   • Smoking status: Never Smoker   • Smokeless tobacco: Never Used   Substance and Sexual Activity   • Alcohol use: Yes     Comment: occasional   • Drug use: No   • Sexual activity: Defer     Family History   Problem Relation Age of Onset   • Breast cancer Other    • Colon cancer Other    • Heart disease Other    • Breast cancer Mother    • Heart disease Mother    • Hypertension Mother    • Heart attack Mother    • Colon cancer Father    • Hypertension Father    • Heart attack Father    • Diabetes Father    • Heart disease Paternal Grandfather    • Heart disease Brother      BP (!) 186/100   Pulse 62   Ht 180.3 cm (71\")   Wt 121 kg (266 lb 9.6 oz)   SpO2 98%   BMI 37.18 kg/m²   Physical Exam  Vitals signs and nursing note reviewed. "   Constitutional:       Appearance: Normal appearance. He is well-developed.   HENT:      Head: Normocephalic and atraumatic.   Eyes:      General: Lids are normal.      Extraocular Movements: Extraocular movements intact.      Conjunctiva/sclera: Conjunctivae normal.      Pupils: Pupils are equal, round, and reactive to light.   Neck:      Musculoskeletal: Normal range of motion and neck supple.      Thyroid: No thyroid mass or thyromegaly.      Vascular: No carotid bruit.      Trachea: Trachea normal. No tracheal deviation.   Cardiovascular:      Rate and Rhythm: Normal rate and regular rhythm.      Heart sounds: Normal heart sounds. No murmur. No friction rub. No gallop.    Pulmonary:      Effort: Pulmonary effort is normal. No respiratory distress.      Breath sounds: Normal breath sounds. No wheezing.   Abdominal:      Comments: Redness left mid abdomen with fluctuance    Musculoskeletal: Normal range of motion.         General: No deformity.   Lymphadenopathy:      Cervical: No cervical adenopathy.   Skin:     General: Skin is warm and dry.      Findings: No erythema or rash.      Nails: There is no clubbing.     Neurological:      Mental Status: He is alert and oriented to person, place, and time.      Cranial Nerves: No cranial nerve deficit.      Deep Tendon Reflexes: Reflexes are normal and symmetric. Reflexes normal.   Psychiatric:         Speech: Speech normal.         Behavior: Behavior normal.         Thought Content: Thought content normal.         Judgment: Judgment normal.       Results for orders placed or performed in visit on 09/21/20   Comprehensive Metabolic Panel    Specimen: Blood   Result Value Ref Range    Glucose 110 (H) 65 - 99 mg/dL    BUN 24 (H) 6 - 20 mg/dL    Creatinine 1.12 0.76 - 1.27 mg/dL    Sodium 141 136 - 145 mmol/L    Potassium 4.2 3.5 - 5.2 mmol/L    Chloride 105 98 - 107 mmol/L    CO2 26.2 22.0 - 29.0 mmol/L    Calcium 10.1 8.6 - 10.5 mg/dL    Total Protein 7.8 6.0 - 8.5  g/dL    Albumin 4.50 3.50 - 5.20 g/dL    ALT (SGPT) 21 1 - 41 U/L    AST (SGOT) 20 1 - 40 U/L    Alkaline Phosphatase 46 39 - 117 U/L    Total Bilirubin 0.5 0.0 - 1.2 mg/dL    eGFR Non African Amer 67 >60 mL/min/1.73    Globulin 3.3 gm/dL    A/G Ratio 1.4 g/dL    BUN/Creatinine Ratio 21.4 7.0 - 25.0    Anion Gap 9.8 5.0 - 15.0 mmol/L   Lipid Panel    Specimen: Blood   Result Value Ref Range    Total Cholesterol 129 0 - 200 mg/dL    Triglycerides 145 0 - 150 mg/dL    HDL Cholesterol 36 (L) 40 - 60 mg/dL    LDL Cholesterol  64 0 - 100 mg/dL    VLDL Cholesterol 29 5 - 40 mg/dL    LDL/HDL Ratio 1.78    TSH    Specimen: Blood   Result Value Ref Range    TSH 2.070 0.270 - 4.200 uIU/mL   Vitamin D 25 Hydroxy    Specimen: Blood   Result Value Ref Range    25 Hydroxy, Vitamin D 42.7 30.0 - 100.0 ng/ml   POC Glycosylated Hemoglobin (Hb A1C)    Specimen: Blood   Result Value Ref Range    Hemoglobin A1C 6.2 %    Lot Number [id number]     Expiration Date 04/22/2022    POC Glucose, Blood    Specimen: Blood   Result Value Ref Range    Glucose 118 70 - 130 mg/dL    Lot Number [id number]     Expiration Date 12/31/2020      Problems Addressed this Visit        Other    Left upper quadrant abdominal mass - Primary     Actually mid abdomen - see CT scan now with redness and itching, fluctuance  rx bactrim ds bid + mupirocin  If not better surgery referral in 2 weeks          Hypertension     Poorly controlled today- forgot dose last night (was playing outdoor with grandchildren, lost track of time)           Diagnoses       Codes Comments    Left upper quadrant abdominal mass    -  Primary ICD-10-CM: R19.02  ICD-9-CM: 789.32     Essential hypertension     ICD-10-CM: I10  ICD-9-CM: 401.9         Return in about 2 weeks (around 3/12/2021) for Recheck.    Mayte Herrera MA  Signed Maida Rey MD

## 2021-02-26 NOTE — ASSESSMENT & PLAN NOTE
Poorly controlled today- forgot dose last night (was playing outdoor with grandchildren, lost track of time)

## 2021-02-26 NOTE — ASSESSMENT & PLAN NOTE
Actually mid abdomen - see CT scan now with redness and itching, fluctuance  rx bactrim ds bid + mupirocin  If not better surgery referral in 2 weeks

## 2021-02-26 NOTE — TELEPHONE ENCOUNTER
Pt wife called states he has a knot on his stomach pt wife states he had an MRI done it was fatty infection pt wife states the knot feels warm states pt is not running a fever but the knot feels feverish pt wife states he might need an antibiotic Please notify pt.

## 2021-03-11 ENCOUNTER — OFFICE VISIT (OUTPATIENT)
Dept: ENDOCRINOLOGY | Facility: CLINIC | Age: 61
End: 2021-03-11

## 2021-03-11 VITALS
BODY MASS INDEX: 35.21 KG/M2 | OXYGEN SATURATION: 99 % | HEIGHT: 72 IN | DIASTOLIC BLOOD PRESSURE: 70 MMHG | HEART RATE: 83 BPM | WEIGHT: 260 LBS | SYSTOLIC BLOOD PRESSURE: 132 MMHG

## 2021-03-11 DIAGNOSIS — R19.02 LEFT UPPER QUADRANT ABDOMINAL MASS: Primary | ICD-10-CM

## 2021-03-11 PROBLEM — L03.311 ABDOMINAL WALL CELLULITIS: Status: ACTIVE | Noted: 2021-03-11

## 2021-03-11 PROCEDURE — 99213 OFFICE O/P EST LOW 20 MIN: CPT | Performed by: INTERNAL MEDICINE

## 2021-03-11 RX ORDER — SULFAMETHOXAZOLE AND TRIMETHOPRIM 800; 160 MG/1; MG/1
1 TABLET ORAL 2 TIMES DAILY
Qty: 20 TABLET | Refills: 0 | OUTPATIENT
Start: 2021-03-11 | End: 2021-04-10

## 2021-03-11 NOTE — PROGRESS NOTES
Buzz Elmore 60 y.o.  CC:2 week FU (left mid quadrant mass)      [place]: 2 week FU (left mid quadrant mass)    Firm area is much less sore after antibiotic  Central area opened and drained   Indurated area medial to open area without tenderness    Allergies   Allergen Reactions   • Atorvastatin Myalgia       Current Outpatient Medications:   •  aspirin (ASPIRIN LOW DOSE) 81 MG tablet, Take 81 mg by mouth Daily., Disp: , Rfl:   •  carvedilol (COREG) 12.5 MG tablet, TAKE ONE TABLET BY MOUTH TWICE A DAY WITH MEALS, Disp: 60 tablet, Rfl: 6  •  Cholecalciferol (VITAMIN D) 2000 UNITS capsule, Take 2,000 Units by mouth Daily., Disp: , Rfl:   •  fenofibrate (TRICOR) 48 MG tablet, Take 1 tablet by mouth Daily., Disp: 90 tablet, Rfl: 1  •  mupirocin (BACTROBAN) 2 % ointment, Apply  topically to the appropriate area as directed 3 (Three) Times a Day., Disp: 30 g, Rfl: 1  •  pantoprazole (PROTONIX) 40 MG EC tablet, Take 1 tablet by mouth Daily., Disp: 90 tablet, Rfl: 3  •  prasugrel (EFFIENT) 10 MG tablet, TAKE ONE TABLET BY MOUTH DAILY, Disp: 90 tablet, Rfl: 3  •  rosuvastatin (CRESTOR) 40 MG tablet, Take 1 tablet by mouth Daily., Disp: 90 tablet, Rfl: 1  •  sulfamethoxazole-trimethoprim (Bactrim DS) 800-160 MG per tablet, Take 1 tablet by mouth 2 (Two) Times a Day., Disp: 20 tablet, Rfl: 0  •  telmisartan-hydrochlorothiazide (Micardis HCT) 80-25 MG per tablet, Take 1 tablet by mouth Daily., Disp: 90 tablet, Rfl: 3  Patient Active Problem List    Diagnosis    • Left upper quadrant abdominal mass [R19.02]    • IHD (ischemic heart disease) [I25.9]    • Obesity [E66.9]    • GERD (gastroesophageal reflux disease) [K21.9]    • Chronic low back pain [M54.5, G89.29]    • Coagulation disorder (CMS/HCC) [D68.9]    • Benign prostatic hyperplasia with urinary obstruction [N40.1, N13.8]    • Chest pain [R07.9]    • Mechanical complication due to tissue graft [T85.698A]    • Atherosclerosis of coronary artery [I25.10]    • Hyperglycemia  "[R73.9]    • Gastroesophageal reflux disease [K21.9]    • Hyperlipidemia [E78.5]    • Hypertension [I10]    • Knee pain [M25.569]    • Lipoma [D17.9]    • Low back pain [M54.5]    • Calculus of kidney [N20.0]    • Muscle pain [M79.10]    • Vitamin D deficiency [E55.9]    • Acute serous otitis media [H65.00]    • 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     Social History     Socioeconomic History   • Marital status:      Spouse name: Not on file   • Number of children: Not on file   • Years of education: Not on file   • Highest education level: Not on file   Tobacco Use   • Smoking status: Never Smoker   • Smokeless tobacco: Never Used   Substance and Sexual Activity   • Alcohol use: Yes     Comment: occasional   • Drug use: No   • Sexual activity: Defer     Family History   Problem Relation Age of Onset   • Breast cancer Other    • Colon cancer Other    • Heart disease Other    • Breast cancer Mother    • Heart disease Mother    • Hypertension Mother    • Heart attack Mother    • Colon cancer Father    • Hypertension Father    • Heart attack Father    • Diabetes Father    • Heart disease Paternal Grandfather    • Heart disease Brother      /70   Pulse 83   Ht 182.9 cm (72\")   Wt 118 kg (260 lb)   SpO2 99%   BMI 35.26 kg/m²   Physical Exam  Abdominal:      General: Abdomen is flat.      Palpations: Abdomen is soft.   Skin:     Comments: Leftward abdominal area of induration and mild redness, approx 4x2 cm with 3 separate area the most lateral of which is now soft , central lesion with crusting   Most medial area still has 2-3 cm of induration with possible central fluctuance       Results for orders placed or performed in visit on 09/21/20   Comprehensive Metabolic Panel    Specimen: Blood   Result Value Ref Range    Glucose 110 (H) 65 - 99 mg/dL "    BUN 24 (H) 6 - 20 mg/dL    Creatinine 1.12 0.76 - 1.27 mg/dL    Sodium 141 136 - 145 mmol/L    Potassium 4.2 3.5 - 5.2 mmol/L    Chloride 105 98 - 107 mmol/L    CO2 26.2 22.0 - 29.0 mmol/L    Calcium 10.1 8.6 - 10.5 mg/dL    Total Protein 7.8 6.0 - 8.5 g/dL    Albumin 4.50 3.50 - 5.20 g/dL    ALT (SGPT) 21 1 - 41 U/L    AST (SGOT) 20 1 - 40 U/L    Alkaline Phosphatase 46 39 - 117 U/L    Total Bilirubin 0.5 0.0 - 1.2 mg/dL    eGFR Non African Amer 67 >60 mL/min/1.73    Globulin 3.3 gm/dL    A/G Ratio 1.4 g/dL    BUN/Creatinine Ratio 21.4 7.0 - 25.0    Anion Gap 9.8 5.0 - 15.0 mmol/L   Lipid Panel    Specimen: Blood   Result Value Ref Range    Total Cholesterol 129 0 - 200 mg/dL    Triglycerides 145 0 - 150 mg/dL    HDL Cholesterol 36 (L) 40 - 60 mg/dL    LDL Cholesterol  64 0 - 100 mg/dL    VLDL Cholesterol 29 5 - 40 mg/dL    LDL/HDL Ratio 1.78    TSH    Specimen: Blood   Result Value Ref Range    TSH 2.070 0.270 - 4.200 uIU/mL   Vitamin D 25 Hydroxy    Specimen: Blood   Result Value Ref Range    25 Hydroxy, Vitamin D 42.7 30.0 - 100.0 ng/ml   POC Glycosylated Hemoglobin (Hb A1C)    Specimen: Blood   Result Value Ref Range    Hemoglobin A1C 6.2 %    Lot Number [id number]     Expiration Date 04/22/2022    POC Glucose, Blood    Specimen: Blood   Result Value Ref Range    Glucose 118 70 - 130 mg/dL    Lot Number [id number]     Expiration Date 12/31/2020      Problems Addressed this Visit        Other    Left upper quadrant abdominal mass - Primary     Continue bactrim and topical   Warm compresses  Call if not resolved in 7 days and will refer to surgery   Area in general is smaller and less red, less painful  [place] for 10 day and call if problems / persistent induration  Abdominal cellulitis ?etiology           Diagnoses       Codes Comments    Left upper quadrant abdominal mass    -  Primary ICD-10-CM: R19.02  ICD-9-CM: 789.32         Return if symptoms worsen or fail to improve, for Recheck.    Mayte Herrera,  MA  Signed Maida Rey MD

## 2021-03-12 NOTE — ASSESSMENT & PLAN NOTE
Continue bactrim and topical   Warm compresses  Call if not resolved in 7 days and will refer to surgery   Area in general is smaller and less red, less painful  [place] for 10 day and call if problems / persistent induration  Abdominal cellulitis ?etiology

## 2021-04-06 ENCOUNTER — TELEPHONE (OUTPATIENT)
Dept: ENDOCRINOLOGY | Facility: CLINIC | Age: 61
End: 2021-04-06

## 2021-04-06 DIAGNOSIS — L03.311 ABDOMINAL WALL CELLULITIS: Primary | ICD-10-CM

## 2021-04-06 NOTE — TELEPHONE ENCOUNTER
Gabrielle was advised the referral has been sent to [facility].  She is asking if he needs more antibiotics   She was advised to call back on Monday when Dr Rey returns if the area is worse or if the appointment is not next week or to go to [facility] sooner if needed  She voiced understanding

## 2021-04-06 NOTE — TELEPHONE ENCOUNTER
[facility] patient  Per last OV on 3-11-21 Dr Rey said if no better she would refer to a surgeon  Please create referral

## 2021-04-06 NOTE — TELEPHONE ENCOUNTER
Pt's wife called in regards to pt. She states he has been to our office a few times for a spot on his stomach. He was given an antibiotic and Bactrim. She states the medication was helping but the spot seems to continue coming back. She is wanting to know if there is something else the patient can take or a stronger dose of current medication. Please give pt or wife a call.

## 2021-04-10 ENCOUNTER — HOSPITAL ENCOUNTER (EMERGENCY)
Facility: HOSPITAL | Age: 61
Discharge: HOME | End: 2021-04-10
Attending: EMERGENCY MEDICINE | Admitting: EMERGENCY MEDICINE

## 2021-04-10 ENCOUNTER — APPOINTMENT (OUTPATIENT)
Dept: CT IMAGING | Facility: HOSPITAL | Age: 61
End: 2021-04-10

## 2021-04-10 VITALS
RESPIRATION RATE: 18 BRPM | TEMPERATURE: 97.8 F | WEIGHT: 260 LBS | BODY MASS INDEX: 34.46 KG/M2 | OXYGEN SATURATION: 95 % | HEART RATE: 58 BPM | HEIGHT: 73 IN | SYSTOLIC BLOOD PRESSURE: 131 MMHG | DIASTOLIC BLOOD PRESSURE: 83 MMHG

## 2021-04-10 DIAGNOSIS — N20.1 URETEROLITHIASIS: ICD-10-CM

## 2021-04-10 DIAGNOSIS — N17.9 ACUTE KIDNEY INJURY (HCC): ICD-10-CM

## 2021-04-10 DIAGNOSIS — N23 RENAL COLIC ON LEFT SIDE: Primary | ICD-10-CM

## 2021-04-10 DIAGNOSIS — R03.0 ELEVATED BLOOD PRESSURE READING: ICD-10-CM

## 2021-04-10 LAB
ALBUMIN SERPL-MCNC: 4.2 G/DL (ref 3.5–5.2)
ALBUMIN/GLOB SERPL: 1.2 G/DL
ALP SERPL-CCNC: 55 U/L (ref 39–117)
ALT SERPL W P-5'-P-CCNC: 17 U/L (ref 1–41)
ANION GAP SERPL CALCULATED.3IONS-SCNC: 10 MMOL/L (ref 5–15)
AST SERPL-CCNC: 19 U/L (ref 1–40)
BASOPHILS # BLD AUTO: 0.03 10*3/MM3 (ref 0–0.2)
BASOPHILS NFR BLD AUTO: 0.3 % (ref 0–1.5)
BILIRUB SERPL-MCNC: 1.1 MG/DL (ref 0–1.2)
BILIRUB UR QL STRIP: NEGATIVE
BUN SERPL-MCNC: 20 MG/DL (ref 6–20)
BUN/CREAT SERPL: 11 (ref 7–25)
CALCIUM SPEC-SCNC: 9.1 MG/DL (ref 8.6–10.5)
CHLORIDE SERPL-SCNC: 99 MMOL/L (ref 98–107)
CLARITY UR: CLEAR
CO2 SERPL-SCNC: 26 MMOL/L (ref 22–29)
COLOR UR: YELLOW
CREAT SERPL-MCNC: 1.82 MG/DL (ref 0.76–1.27)
D-LACTATE SERPL-SCNC: 1.2 MMOL/L (ref 0.5–2)
DEPRECATED RDW RBC AUTO: 43.9 FL (ref 37–54)
EOSINOPHIL # BLD AUTO: 0.05 10*3/MM3 (ref 0–0.4)
EOSINOPHIL NFR BLD AUTO: 0.4 % (ref 0.3–6.2)
ERYTHROCYTE [DISTWIDTH] IN BLOOD BY AUTOMATED COUNT: 14.5 % (ref 12.3–15.4)
GFR SERPL CREATININE-BSD FRML MDRD: 38 ML/MIN/1.73
GLOBULIN UR ELPH-MCNC: 3.5 GM/DL
GLUCOSE SERPL-MCNC: 148 MG/DL (ref 65–99)
GLUCOSE UR STRIP-MCNC: NEGATIVE MG/DL
HCT VFR BLD AUTO: 40.4 % (ref 37.5–51)
HGB BLD-MCNC: 14.2 G/DL (ref 13–17.7)
HGB UR QL STRIP.AUTO: NEGATIVE
HOLD SPECIMEN: NORMAL
IMM GRANULOCYTES # BLD AUTO: 0.03 10*3/MM3 (ref 0–0.05)
IMM GRANULOCYTES NFR BLD AUTO: 0.3 % (ref 0–0.5)
KETONES UR QL STRIP: NEGATIVE
LEUKOCYTE ESTERASE UR QL STRIP.AUTO: NEGATIVE
LIPASE SERPL-CCNC: 23 U/L (ref 13–60)
LYMPHOCYTES # BLD AUTO: 2.25 10*3/MM3 (ref 0.7–3.1)
LYMPHOCYTES NFR BLD AUTO: 19 % (ref 19.6–45.3)
MCH RBC QN AUTO: 32 PG (ref 26.6–33)
MCHC RBC AUTO-ENTMCNC: 35.1 G/DL (ref 31.5–35.7)
MCV RBC AUTO: 91 FL (ref 79–97)
MONOCYTES # BLD AUTO: 0.97 10*3/MM3 (ref 0.1–0.9)
MONOCYTES NFR BLD AUTO: 8.2 % (ref 5–12)
NEUTROPHILS NFR BLD AUTO: 71.8 % (ref 42.7–76)
NEUTROPHILS NFR BLD AUTO: 8.53 10*3/MM3 (ref 1.7–7)
NITRITE UR QL STRIP: NEGATIVE
NRBC BLD AUTO-RTO: 0 /100 WBC (ref 0–0.2)
PH UR STRIP.AUTO: 5.5 [PH] (ref 5–8)
PLATELET # BLD AUTO: 168 10*3/MM3 (ref 140–450)
PMV BLD AUTO: 10.2 FL (ref 6–12)
POTASSIUM SERPL-SCNC: 4 MMOL/L (ref 3.5–5.2)
PROT SERPL-MCNC: 7.7 G/DL (ref 6–8.5)
PROT UR QL STRIP: NEGATIVE
RBC # BLD AUTO: 4.44 10*6/MM3 (ref 4.14–5.8)
SODIUM SERPL-SCNC: 135 MMOL/L (ref 136–145)
SP GR UR STRIP: 1.02 (ref 1–1.03)
UROBILINOGEN UR QL STRIP: NORMAL
WBC # BLD AUTO: 11.86 10*3/MM3 (ref 3.4–10.8)
WHOLE BLOOD HOLD SPECIMEN: NORMAL
WHOLE BLOOD HOLD SPECIMEN: NORMAL

## 2021-04-10 PROCEDURE — 74176 CT ABD & PELVIS W/O CONTRAST: CPT

## 2021-04-10 PROCEDURE — 80053 COMPREHEN METABOLIC PANEL: CPT | Performed by: EMERGENCY MEDICINE

## 2021-04-10 PROCEDURE — 85025 COMPLETE CBC W/AUTO DIFF WBC: CPT | Performed by: EMERGENCY MEDICINE

## 2021-04-10 PROCEDURE — 25010000002 ONDANSETRON PER 1 MG: Performed by: EMERGENCY MEDICINE

## 2021-04-10 PROCEDURE — 83690 ASSAY OF LIPASE: CPT | Performed by: EMERGENCY MEDICINE

## 2021-04-10 PROCEDURE — 83605 ASSAY OF LACTIC ACID: CPT | Performed by: EMERGENCY MEDICINE

## 2021-04-10 PROCEDURE — P9612 CATHETERIZE FOR URINE SPEC: HCPCS

## 2021-04-10 PROCEDURE — 81003 URINALYSIS AUTO W/O SCOPE: CPT | Performed by: EMERGENCY MEDICINE

## 2021-04-10 PROCEDURE — 99283 EMERGENCY DEPT VISIT LOW MDM: CPT

## 2021-04-10 PROCEDURE — 25010000002 KETOROLAC TROMETHAMINE PER 15 MG: Performed by: EMERGENCY MEDICINE

## 2021-04-10 PROCEDURE — 96374 THER/PROPH/DIAG INJ IV PUSH: CPT

## 2021-04-10 PROCEDURE — 96375 TX/PRO/DX INJ NEW DRUG ADDON: CPT

## 2021-04-10 RX ORDER — HYDROCODONE BITARTRATE AND ACETAMINOPHEN 7.5; 325 MG/1; MG/1
1 TABLET ORAL ONCE
Status: COMPLETED | OUTPATIENT
Start: 2021-04-10 | End: 2021-04-10

## 2021-04-10 RX ORDER — TAMSULOSIN HYDROCHLORIDE 0.4 MG/1
1 CAPSULE ORAL NIGHTLY
Qty: 30 CAPSULE | Refills: 0 | Status: SHIPPED | OUTPATIENT
Start: 2021-04-10

## 2021-04-10 RX ORDER — KETOROLAC TROMETHAMINE 15 MG/ML
15 INJECTION, SOLUTION INTRAMUSCULAR; INTRAVENOUS ONCE
Status: COMPLETED | OUTPATIENT
Start: 2021-04-10 | End: 2021-04-10

## 2021-04-10 RX ORDER — HYDROCODONE BITARTRATE AND ACETAMINOPHEN 7.5; 325 MG/1; MG/1
1 TABLET ORAL EVERY 6 HOURS PRN
Qty: 10 TABLET | Refills: 0 | Status: SHIPPED | OUTPATIENT
Start: 2021-04-10 | End: 2021-10-11

## 2021-04-10 RX ORDER — ONDANSETRON 2 MG/ML
4 INJECTION INTRAMUSCULAR; INTRAVENOUS ONCE
Status: COMPLETED | OUTPATIENT
Start: 2021-04-10 | End: 2021-04-10

## 2021-04-10 RX ORDER — MORPHINE SULFATE 4 MG/ML
4 INJECTION, SOLUTION INTRAMUSCULAR; INTRAVENOUS
Status: DISCONTINUED | OUTPATIENT
Start: 2021-04-10 | End: 2021-04-10 | Stop reason: HOSPADM

## 2021-04-10 RX ORDER — SODIUM CHLORIDE 9 MG/ML
10 INJECTION INTRAVENOUS AS NEEDED
Status: DISCONTINUED | OUTPATIENT
Start: 2021-04-10 | End: 2021-04-10 | Stop reason: HOSPADM

## 2021-04-10 RX ADMIN — LIDOCAINE HYDROCHLORIDE 150 MG: 10 INJECTION, SOLUTION EPIDURAL; INFILTRATION; INTRACAUDAL; PERINEURAL at 08:48

## 2021-04-10 RX ADMIN — HYDROCODONE BITARTRATE AND ACETAMINOPHEN 1 TABLET: 7.5; 325 TABLET ORAL at 12:56

## 2021-04-10 RX ADMIN — KETOROLAC TROMETHAMINE 15 MG: 15 INJECTION, SOLUTION INTRAMUSCULAR; INTRAVENOUS at 08:33

## 2021-04-10 RX ADMIN — ONDANSETRON 4 MG: 2 INJECTION INTRAMUSCULAR; INTRAVENOUS at 08:34

## 2021-04-10 RX ADMIN — SODIUM CHLORIDE, POTASSIUM CHLORIDE, SODIUM LACTATE AND CALCIUM CHLORIDE 1000 ML: 600; 310; 30; 20 INJECTION, SOLUTION INTRAVENOUS at 11:09

## 2021-04-10 RX ADMIN — SODIUM CHLORIDE, POTASSIUM CHLORIDE, SODIUM LACTATE AND CALCIUM CHLORIDE 1000 ML: 600; 310; 30; 20 INJECTION, SOLUTION INTRAVENOUS at 08:33

## 2021-04-10 NOTE — ED PROVIDER NOTES
Subjective   Mr. Buzz Elmore is a 60yoM who presents for evaluation of 3 days of left flank pain. Pain is intermittent, sharp and stabbing, and radiates around his side to the left anterior abdomen. Has also had some general malaise over the last few days. Has not had these symptoms before but does have a history of a 6mm kidney stone found incidentally on a CT during evaluation for an abdominal wall abscess. He has taken tylenol for pain with some relief. Denies fever/chills, nausea, headache, dysuria, hematuria, diarrhea/constipation, testicular pain.       History provided by:  Patient      Review of Systems   Constitutional: Positive for fatigue. Negative for chills and fever.   Respiratory: Negative for cough and shortness of breath.    Cardiovascular: Negative for chest pain.   Gastrointestinal: Negative for abdominal pain, blood in stool, constipation, diarrhea, nausea and vomiting.   Genitourinary: Positive for flank pain. Negative for decreased urine volume, difficulty urinating, dysuria, hematuria, testicular pain and urgency.   All other systems reviewed and are negative.      Past Medical History:   Diagnosis Date   • Chronic low back pain    • GERD (gastroesophageal reflux disease)    • Hyperlipidemia    • Hypertension    • IHD (ischemic heart disease)    • Obesity        Allergies   Allergen Reactions   • Atorvastatin Myalgia       Past Surgical History:   Procedure Laterality Date   • CORONARY STENT PLACEMENT     • HAND SURGERY      skin graft on finger   • KNEE ARTHROTOMY      History of Arthrotomy Of Knee With Medial Meniscectomy   • KNEE SURGERY Right     meniscus       Family History   Problem Relation Age of Onset   • Breast cancer Other    • Colon cancer Other    • Heart disease Other    • Breast cancer Mother    • Heart disease Mother    • Hypertension Mother    • Heart attack Mother    • Colon cancer Father    • Hypertension Father    • Heart attack Father    • Diabetes Father    • Heart  disease Paternal Grandfather    • Heart disease Brother        Social History     Socioeconomic History   • Marital status:      Spouse name: Not on file   • Number of children: Not on file   • Years of education: Not on file   • Highest education level: Not on file   Tobacco Use   • Smoking status: Never Smoker   • Smokeless tobacco: Never Used   Substance and Sexual Activity   • Alcohol use: Yes     Comment: occasional   • Drug use: No   • Sexual activity: Defer           Objective   Physical Exam  Vitals and nursing note reviewed.   Constitutional:       Appearance: Normal appearance.   Eyes:      Extraocular Movements: Extraocular movements intact.   Cardiovascular:      Rate and Rhythm: Normal rate.      Heart sounds: Normal heart sounds.   Pulmonary:      Effort: Pulmonary effort is normal.      Breath sounds: Normal breath sounds.   Abdominal:      General: Bowel sounds are normal.      Palpations: Abdomen is soft.      Tenderness: There is left CVA tenderness.   Skin:     General: Skin is warm and dry.      Comments: Healing wound on anterior abdomen, left lower quadrant.   Neurological:      General: No focal deficit present.      Mental Status: He is alert and oriented to person, place, and time.         Procedures           ED Course  ED Course as of Apr 11 1834   Sat Apr 10, 2021   1021 Creatinine(!): 1.82 [RS]   1024 Patient with left 7 mm left UPJ stone.  Patient also has acute renal insufficiency.  With all of this, we will plan admission for further evaluation management.  Hospitalist paged for admission.    [RS]   1029 I discussed the case with Dr. Henry who advises that the patient he can either be admitted or followed up as an outpatient depending on his symptoms.    [RS]   1033 Patient is resting comfortably and states he feels much better.  He states the pain is involved.  I discussed the recommendations and options with the urologist.  He states that he is happy to go home and  "follow-up as an outpatient.  We are still awaiting his urine sample.    [RS]      ED Course User Index  [RS] Gavin Dougherty MD                                           MDM  Number of Diagnoses or Management Options  Acute kidney injury (CMS/HCC)  Elevated blood pressure reading  Renal colic on left side  Ureterolithiasis  Diagnosis management comments: No results found for this or any previous visit (from the past 24 hour(s)).  Note: In addition to lab results from this visit, the labs listed above may include labs taken at another facility or during a different encounter within the last 24 hours. Please correlate lab times with ED admission and discharge times for further clarification of the services performed during this visit.    CT Abdomen Pelvis Without Contrast   Final Result    7 mm obstructing stone at the left UPJ with mild associated    obstructive nephropathy.              This report was finalized on 4/10/2021 9:40 AM by Tacos Lucas.          ------------------------------------------------------------               04/10/21      04/10/21  04/10/21  04/10/21                  0808          0900      0912      1132     ------------------------------------------------------------   BP:          146/99        131/83                         BP Location:    Left arm                                      Patient Position:     Sitting                                      Pulse:         67            63        63        58       Resp:          18                                         Temp:   97.8 °F (36.6 °C)                                 SpO2:          96%          98%       95%       95%       Weight:  118 kg (260 lb)                                  Height:  185.4 cm (73\")                                  ------------------------------------------------------------  Medications  lactated ringers bolus 1,000 mL (0 mL " Intravenous Stopped 4/10/21 1300)  ketorolac (TORADOL) injection 15 mg (15 mg Intravenous Given 4/10/21 0833)  lidocaine (XYLOCAINE) 1 % 150 mg in sodium chloride 0.9 % 250 mL IVPB (150 mg Intravenous Given 4/10/21 0848)  ondansetron (ZOFRAN) injection 4 mg (4 mg Intravenous Given 4/10/21 0834)  lactated ringers bolus 1,000 mL (0 mL Intravenous Stopped 4/10/21 1300)  HYDROcodone-acetaminophen (NORCO) 7.5-325 MG per tablet 1 tablet (1 tablet Oral Given 4/10/21 1256)  ECG/EMG Results (last 24 hours)     ** No results found for the last 24 hours. **      No orders to display         Amount and/or Complexity of Data Reviewed  Clinical lab tests: reviewed  Tests in the radiology section of CPT®: reviewed  Decide to obtain previous medical records or to obtain history from someone other than the patient: yes  Independent visualization of images, tracings, or specimens: yes        Final diagnoses:   Renal colic on left side   Ureterolithiasis   Acute kidney injury (CMS/HCC)   Elevated blood pressure reading       ED Disposition  ED Disposition     ED Disposition Condition Comment    Discharge Stable           Maida Rey MD  [address]  [facility] [address]  [phone]          [facility] Emergency Department  [address] [address] [phone]    As needed, If symptoms worsen or ANY concerns.    Marito Henry MD  [address] [id number]  [phone]    Call on 4/12/2021  As soon as possible.         Medication List      New Prescriptions    HYDROcodone-acetaminophen 7.5-325 MG per tablet  Commonly known as: NORCO  Take 1 tablet by mouth Every 6 (Six) Hours As Needed for Moderate Pain .     tamsulosin 0.4 MG capsule 24 hr capsule  Commonly known as: FLOMAX  Take 1 capsule by mouth Every Night.        Stop    sulfamethoxazole-trimethoprim 800-160 MG per tablet  Commonly known as: Bactrim DS           Where to Get  Your Medications      These medications were sent to [id number] - [place], [place] - [address] - [phone]  - [phone] FX  [address]  [facility] [address] [place] [address]    Phone: [phone]   · HYDROcodone-acetaminophen 7.5-325 MG per tablet  · tamsulosin 0.4 MG capsule 24 hr capsule          Gavin Dougherty MD  04/11/21 1702       Gavin Dougherty MD  04/11/21 2651

## 2021-04-12 ENCOUNTER — TELEPHONE (OUTPATIENT)
Dept: ENDOCRINOLOGY | Facility: CLINIC | Age: 61
End: 2021-04-12

## 2021-04-12 NOTE — TELEPHONE ENCOUNTER
Pt was in the hospital over the weekend with a kidney stone - ED note in epic. They are working on setting up an appt with urology. In the mean time, pts wife is wanting to know if Dr. Rey will call in an antibiotic for him? He is in a lot of pain. Please send rx to [facility] in [place].

## 2021-04-12 NOTE — TELEPHONE ENCOUNTER
Gabrielle  pt went to ER on Saturday and was diagnosed with left side kidney stone and released to call a urologist this AM.  He was told it was 7mm and he would probably not pass it.  He was given #10 hydrocodone and now only has 3 left. She called the office this am but has not received a call back yet from Marito Henry. He was actually rolling on the floor last night due to the pain.  She is asking what to do about trying to get in with urology?? Or if he should go back to ER to try to be admitted?  Also has never been notified about the surgical referral and that area is getting red again and she thinks he needs another antibiotic for it as well  Please advise

## 2021-04-12 NOTE — TELEPHONE ENCOUNTER
I called Gabrielle to see if the 3 pain pills he has will be enough until tomorrow's appointment and she thinks it will be enough.  She was advised Anna will call the surgeon's office to check on the referral and will let her know  She voiced understanding

## 2021-04-12 NOTE — TELEPHONE ENCOUNTER
I spoke w/ Mrs. Elmore and gave her Buzz's appointment information with Dr. Jerome. I also let her know that I would call with an appointment to infectious disease.

## 2021-04-29 ENCOUNTER — LAB (OUTPATIENT)
Dept: LAB | Facility: HOSPITAL | Age: 61
End: 2021-04-29

## 2021-04-29 ENCOUNTER — TRANSCRIBE ORDERS (OUTPATIENT)
Dept: LAB | Facility: HOSPITAL | Age: 61
End: 2021-04-29

## 2021-04-29 DIAGNOSIS — L03.311 CELLULITIS OF ABDOMINAL WALL: ICD-10-CM

## 2021-04-29 DIAGNOSIS — E66.9 OBESITY, UNSPECIFIED CLASSIFICATION, UNSPECIFIED OBESITY TYPE, UNSPECIFIED WHETHER SERIOUS COMORBIDITY PRESENT: ICD-10-CM

## 2021-04-29 DIAGNOSIS — L02.211 ABSCESS OF ABDOMINAL WALL: ICD-10-CM

## 2021-04-29 DIAGNOSIS — E66.9 OBESITY, UNSPECIFIED CLASSIFICATION, UNSPECIFIED OBESITY TYPE, UNSPECIFIED WHETHER SERIOUS COMORBIDITY PRESENT: Primary | ICD-10-CM

## 2021-04-29 DIAGNOSIS — I10 ESSENTIAL HYPERTENSION, BENIGN: ICD-10-CM

## 2021-04-29 PROCEDURE — 87070 CULTURE OTHR SPECIMN AEROBIC: CPT

## 2021-04-29 PROCEDURE — 87205 SMEAR GRAM STAIN: CPT

## 2021-05-02 LAB
BACTERIA SPEC AEROBE CULT: NORMAL
GRAM STN SPEC: NORMAL
GRAM STN SPEC: NORMAL

## 2021-05-10 RX ORDER — CARVEDILOL 12.5 MG/1
TABLET ORAL
Qty: 60 TABLET | Refills: 5 | Status: SHIPPED | OUTPATIENT
Start: 2021-05-10 | End: 2021-11-09

## 2021-05-19 NOTE — ASSESSMENT & PLAN NOTE
Blood sugar and 90 day average sugar discussed with him  Results for orders placed or performed in visit on 01/24/17   POC Glycosylated Hemoglobin (Hb A1C)   Result Value Ref Range    Hemoglobin A1C 6.4 %     Is near diabetic   See above plan for increased activity and weight loss  If not consider adding metformin   
Due to inactivity and weight gain  Discussed cleaning up diet, work on weight loss  Discussed increase walking/ physical activity  Offered referral to PT but he would prefer to try chiropracter and above measures  F/u 3-4 month s  
High bp - increase coreg to 12.5 mg twice daily   Check bp at home and call if over 145/85     
Is on statin therapy, known cad  Check flp and ast   
On supplement  Check levels yearly   
Recent neg stress test Dr Kitchen  Is 6 years out from stent   
---

## 2021-06-08 RX ORDER — FENOFIBRATE 48 MG/1
TABLET, COATED ORAL
Qty: 90 TABLET | Refills: 1 | Status: SHIPPED | OUTPATIENT
Start: 2021-06-08 | End: 2021-12-03

## 2021-07-29 ENCOUNTER — TELEPHONE (OUTPATIENT)
Dept: ENDOCRINOLOGY | Facility: CLINIC | Age: 61
End: 2021-07-29

## 2021-07-29 ENCOUNTER — TELEPHONE (OUTPATIENT)
Dept: CARDIOLOGY | Facility: CLINIC | Age: 61
End: 2021-07-29

## 2021-07-29 DIAGNOSIS — E78.2 MIXED HYPERLIPIDEMIA: ICD-10-CM

## 2021-07-29 DIAGNOSIS — R07.2 PRECORDIAL PAIN: Primary | ICD-10-CM

## 2021-07-29 DIAGNOSIS — Z12.11 ENCOUNTER FOR SCREENING COLONOSCOPY: Primary | ICD-10-CM

## 2021-07-29 DIAGNOSIS — I25.9 IHD (ISCHEMIC HEART DISEASE): ICD-10-CM

## 2021-07-29 DIAGNOSIS — I10 ESSENTIAL HYPERTENSION: ICD-10-CM

## 2021-07-29 DIAGNOSIS — I25.118 ATHEROSCLEROSIS OF NATIVE CORONARY ARTERY OF NATIVE HEART WITH OTHER FORM OF ANGINA PECTORIS (HCC): ICD-10-CM

## 2021-07-29 NOTE — TELEPHONE ENCOUNTER
PT'S WIFE CALLED REQUESTING A CALL BACK IN REGARDS TO GETTING THIS PT A COLONOSCOPY SET FOR 10/04/2021. PLEASE CONFER W/ DR AND REACH OUT TO PT'S WIFE AT EARLIEST CONVENIENCE. THANK YOU

## 2021-07-29 NOTE — TELEPHONE ENCOUNTER
Pt has never had a colonoscopy and would like one scheduled on October 4, 2021 (Gabrielle does not babysit that day and could take him)

## 2021-08-03 RX ORDER — FLUTICASONE PROPIONATE 50 MCG
SPRAY, SUSPENSION (ML) NASAL
Qty: 16 G | Refills: 1 | Status: SHIPPED | OUTPATIENT
Start: 2021-08-03 | End: 2021-10-11 | Stop reason: SDUPTHER

## 2021-09-07 RX ORDER — ROSUVASTATIN CALCIUM 40 MG/1
TABLET, COATED ORAL
Qty: 90 TABLET | Refills: 1 | Status: SHIPPED | OUTPATIENT
Start: 2021-09-07 | End: 2022-03-04

## 2021-09-17 ENCOUNTER — HOSPITAL ENCOUNTER (OUTPATIENT)
Dept: CARDIOLOGY | Facility: HOSPITAL | Age: 61
Discharge: HOME | End: 2021-09-17
Admitting: INTERNAL MEDICINE

## 2021-09-17 VITALS
DIASTOLIC BLOOD PRESSURE: 98 MMHG | HEIGHT: 73 IN | BODY MASS INDEX: 34.46 KG/M2 | HEART RATE: 56 BPM | SYSTOLIC BLOOD PRESSURE: 132 MMHG | WEIGHT: 260 LBS

## 2021-09-17 DIAGNOSIS — I10 ESSENTIAL HYPERTENSION: ICD-10-CM

## 2021-09-17 DIAGNOSIS — I25.118 ATHEROSCLEROSIS OF NATIVE CORONARY ARTERY OF NATIVE HEART WITH OTHER FORM OF ANGINA PECTORIS (HCC): ICD-10-CM

## 2021-09-17 DIAGNOSIS — Z12.11 ENCOUNTER FOR SCREENING COLONOSCOPY: Primary | ICD-10-CM

## 2021-09-17 DIAGNOSIS — R07.2 PRECORDIAL PAIN: ICD-10-CM

## 2021-09-17 DIAGNOSIS — E78.2 MIXED HYPERLIPIDEMIA: ICD-10-CM

## 2021-09-17 DIAGNOSIS — I25.9 IHD (ISCHEMIC HEART DISEASE): ICD-10-CM

## 2021-09-17 LAB
BH CV REST NUCLEAR ISOTOPE DOSE: 9.9 MCI
BH CV STRESS BP STAGE 2: NORMAL
BH CV STRESS BP STAGE 4: NORMAL
BH CV STRESS COMMENTS STAGE 1: NORMAL
BH CV STRESS DOSE REGADENOSON STAGE 1: 0.4
BH CV STRESS DURATION MIN STAGE 1: 1
BH CV STRESS DURATION MIN STAGE 2: 1
BH CV STRESS DURATION MIN STAGE 3: 1
BH CV STRESS DURATION MIN STAGE 4: 1
BH CV STRESS DURATION SEC STAGE 2: 0
BH CV STRESS HR STAGE 1: 56
BH CV STRESS HR STAGE 2: 83
BH CV STRESS HR STAGE 3: 78
BH CV STRESS HR STAGE 4: 74
BH CV STRESS NUCLEAR ISOTOPE DOSE: 32.7 MCI
BH CV STRESS O2 STAGE 1: 96
BH CV STRESS O2 STAGE 2: 95
BH CV STRESS O2 STAGE 3: 98
BH CV STRESS O2 STAGE 4: 98
BH CV STRESS PROTOCOL 1: NORMAL
BH CV STRESS RECOVERY BP: NORMAL MMHG
BH CV STRESS RECOVERY HR: 68 BPM
BH CV STRESS RECOVERY O2: 97 %
BH CV STRESS STAGE 1: 1
BH CV STRESS STAGE 2: 2
BH CV STRESS STAGE 3: 3
BH CV STRESS STAGE 4: 4
LV EF NUC BP: 77 %
MAXIMAL PREDICTED HEART RATE: 161 BPM
PERCENT MAX PREDICTED HR: 51.55 %
STRESS BASELINE BP: NORMAL MMHG
STRESS BASELINE HR: 55 BPM
STRESS O2 SAT REST: 96 %
STRESS PERCENT HR: 61 %
STRESS POST ESTIMATED WORKLOAD: 1 METS
STRESS POST EXERCISE DUR MIN: 4 MIN
STRESS POST EXERCISE DUR SEC: 0 SEC
STRESS POST O2 SAT PEAK: 98 %
STRESS POST PEAK BP: NORMAL MMHG
STRESS POST PEAK HR: 83 BPM
STRESS TARGET HR: 137 BPM

## 2021-09-17 PROCEDURE — 93017 CV STRESS TEST TRACING ONLY: CPT

## 2021-09-17 PROCEDURE — 0 TECHNETIUM SESTAMIBI: Performed by: INTERNAL MEDICINE

## 2021-09-17 PROCEDURE — A9500 TC99M SESTAMIBI: HCPCS | Performed by: INTERNAL MEDICINE

## 2021-09-17 PROCEDURE — 25010000002 REGADENOSON 0.4 MG/5ML SOLUTION: Performed by: INTERNAL MEDICINE

## 2021-09-17 PROCEDURE — 93018 CV STRESS TEST I&R ONLY: CPT | Performed by: INTERNAL MEDICINE

## 2021-09-17 PROCEDURE — 78452 HT MUSCLE IMAGE SPECT MULT: CPT

## 2021-09-17 PROCEDURE — 78452 HT MUSCLE IMAGE SPECT MULT: CPT | Performed by: INTERNAL MEDICINE

## 2021-09-17 RX ADMIN — TECHNETIUM TC 99M SESTAMIBI 1 DOSE: 1 INJECTION INTRAVENOUS at 08:25

## 2021-09-17 RX ADMIN — TECHNETIUM TC 99M SESTAMIBI 1 DOSE: 1 INJECTION INTRAVENOUS at 10:15

## 2021-09-17 RX ADMIN — REGADENOSON 0.4 MG: 0.08 INJECTION, SOLUTION INTRAVENOUS at 10:12

## 2021-09-21 ENCOUNTER — TELEPHONE (OUTPATIENT)
Dept: CARDIOLOGY | Facility: CLINIC | Age: 61
End: 2021-09-21

## 2021-09-21 NOTE — TELEPHONE ENCOUNTER
Spoke with patient, having colonoscopy with Dr. Austin on 10/14, requesting pre procedure risk assessment and permission to hold effient for 7 days.

## 2021-10-04 ENCOUNTER — OUTSIDE FACILITY SERVICE (OUTPATIENT)
Dept: GASTROENTEROLOGY | Facility: CLINIC | Age: 61
End: 2021-10-04

## 2021-10-04 PROCEDURE — 45385 COLONOSCOPY W/LESION REMOVAL: CPT | Performed by: INTERNAL MEDICINE

## 2021-10-04 PROCEDURE — 45380 COLONOSCOPY AND BIOPSY: CPT | Performed by: INTERNAL MEDICINE

## 2021-10-04 PROCEDURE — 88305 TISSUE EXAM BY PATHOLOGIST: CPT | Performed by: INTERNAL MEDICINE

## 2021-10-05 ENCOUNTER — LAB REQUISITION (OUTPATIENT)
Dept: LAB | Facility: HOSPITAL | Age: 61
End: 2021-10-05

## 2021-10-05 DIAGNOSIS — K57.30 DIVERTICULOSIS OF LARGE INTESTINE WITHOUT PERFORATION OR ABSCESS WITHOUT BLEEDING: ICD-10-CM

## 2021-10-05 DIAGNOSIS — K64.8 OTHER HEMORRHOIDS: ICD-10-CM

## 2021-10-05 DIAGNOSIS — K63.5 POLYP OF COLON: ICD-10-CM

## 2021-10-05 DIAGNOSIS — Z80.0 FAMILY HISTORY OF MALIGNANT NEOPLASM OF DIGESTIVE ORGANS: ICD-10-CM

## 2021-10-05 DIAGNOSIS — Z12.11 ENCOUNTER FOR SCREENING FOR MALIGNANT NEOPLASM OF COLON: ICD-10-CM

## 2021-10-06 LAB
CYTO UR: NORMAL
LAB AP CASE REPORT: NORMAL
LAB AP CLINICAL INFORMATION: NORMAL
PATH REPORT.FINAL DX SPEC: NORMAL
PATH REPORT.GROSS SPEC: NORMAL

## 2021-10-11 ENCOUNTER — OFFICE VISIT (OUTPATIENT)
Dept: ENDOCRINOLOGY | Facility: CLINIC | Age: 61
End: 2021-10-11

## 2021-10-11 ENCOUNTER — LAB (OUTPATIENT)
Dept: LAB | Facility: HOSPITAL | Age: 61
End: 2021-10-11

## 2021-10-11 VITALS
HEART RATE: 74 BPM | HEIGHT: 73 IN | WEIGHT: 262.2 LBS | DIASTOLIC BLOOD PRESSURE: 74 MMHG | SYSTOLIC BLOOD PRESSURE: 128 MMHG | BODY MASS INDEX: 34.75 KG/M2 | OXYGEN SATURATION: 98 %

## 2021-10-11 DIAGNOSIS — R73.9 HYPERGLYCEMIA: Primary | ICD-10-CM

## 2021-10-11 DIAGNOSIS — I10 PRIMARY HYPERTENSION: ICD-10-CM

## 2021-10-11 DIAGNOSIS — E55.9 VITAMIN D DEFICIENCY: ICD-10-CM

## 2021-10-11 DIAGNOSIS — E78.2 MIXED HYPERLIPIDEMIA: ICD-10-CM

## 2021-10-11 LAB
25(OH)D3 SERPL-MCNC: 53.9 NG/ML
ALBUMIN SERPL-MCNC: 4.5 G/DL (ref 3.5–5.2)
ALBUMIN/GLOB SERPL: 1.5 G/DL
ALP SERPL-CCNC: 48 U/L (ref 39–117)
ALT SERPL W P-5'-P-CCNC: 25 U/L (ref 1–41)
ANION GAP SERPL CALCULATED.3IONS-SCNC: 10.3 MMOL/L (ref 5–15)
AST SERPL-CCNC: 23 U/L (ref 1–40)
BASOPHILS # BLD AUTO: 0.05 10*3/MM3 (ref 0–0.2)
BASOPHILS NFR BLD AUTO: 0.6 % (ref 0–1.5)
BILIRUB SERPL-MCNC: 0.6 MG/DL (ref 0–1.2)
BUN SERPL-MCNC: 18 MG/DL (ref 6–20)
BUN/CREAT SERPL: 15.1 (ref 7–25)
CALCIUM SPEC-SCNC: 9.8 MG/DL (ref 8.6–10.5)
CHLORIDE SERPL-SCNC: 103 MMOL/L (ref 98–107)
CHOLEST SERPL-MCNC: 146 MG/DL (ref 0–200)
CO2 SERPL-SCNC: 24.7 MMOL/L (ref 22–29)
CREAT SERPL-MCNC: 1.19 MG/DL (ref 0.76–1.27)
DEPRECATED RDW RBC AUTO: 40.1 FL (ref 37–54)
EOSINOPHIL # BLD AUTO: 0.12 10*3/MM3 (ref 0–0.4)
EOSINOPHIL NFR BLD AUTO: 1.4 % (ref 0.3–6.2)
ERYTHROCYTE [DISTWIDTH] IN BLOOD BY AUTOMATED COUNT: 13 % (ref 12.3–15.4)
EXPIRATION DATE: NORMAL
EXPIRATION DATE: NORMAL
GFR SERPL CREATININE-BSD FRML MDRD: 63 ML/MIN/1.73
GLOBULIN UR ELPH-MCNC: 3.1 GM/DL
GLUCOSE BLDC GLUCOMTR-MCNC: 117 MG/DL (ref 70–130)
GLUCOSE SERPL-MCNC: 121 MG/DL (ref 65–99)
HBA1C MFR BLD: 6.3 %
HCT VFR BLD AUTO: 40.5 % (ref 37.5–51)
HDLC SERPL-MCNC: 41 MG/DL (ref 40–60)
HGB BLD-MCNC: 14 G/DL (ref 13–17.7)
IMM GRANULOCYTES # BLD AUTO: 0.04 10*3/MM3 (ref 0–0.05)
IMM GRANULOCYTES NFR BLD AUTO: 0.5 % (ref 0–0.5)
LDLC SERPL CALC-MCNC: 80 MG/DL (ref 0–100)
LDLC/HDLC SERPL: 1.86 {RATIO}
LYMPHOCYTES # BLD AUTO: 3.49 10*3/MM3 (ref 0.7–3.1)
LYMPHOCYTES NFR BLD AUTO: 41.4 % (ref 19.6–45.3)
Lab: NORMAL
Lab: NORMAL
MCH RBC QN AUTO: 30.1 PG (ref 26.6–33)
MCHC RBC AUTO-ENTMCNC: 34.6 G/DL (ref 31.5–35.7)
MCV RBC AUTO: 87.1 FL (ref 79–97)
MONOCYTES # BLD AUTO: 0.57 10*3/MM3 (ref 0.1–0.9)
MONOCYTES NFR BLD AUTO: 6.8 % (ref 5–12)
NEUTROPHILS NFR BLD AUTO: 4.16 10*3/MM3 (ref 1.7–7)
NEUTROPHILS NFR BLD AUTO: 49.3 % (ref 42.7–76)
NRBC BLD AUTO-RTO: 0 /100 WBC (ref 0–0.2)
PLATELET # BLD AUTO: 176 10*3/MM3 (ref 140–450)
PMV BLD AUTO: 10.5 FL (ref 6–12)
POTASSIUM SERPL-SCNC: 4 MMOL/L (ref 3.5–5.2)
PROT SERPL-MCNC: 7.6 G/DL (ref 6–8.5)
RBC # BLD AUTO: 4.65 10*6/MM3 (ref 4.14–5.8)
SODIUM SERPL-SCNC: 138 MMOL/L (ref 136–145)
T4 FREE SERPL-MCNC: 1.26 NG/DL (ref 0.93–1.7)
TRIGL SERPL-MCNC: 143 MG/DL (ref 0–150)
TSH SERPL DL<=0.05 MIU/L-ACNC: 2.83 UIU/ML (ref 0.27–4.2)
VLDLC SERPL-MCNC: 25 MG/DL (ref 5–40)
WBC # BLD AUTO: 8.43 10*3/MM3 (ref 3.4–10.8)

## 2021-10-11 PROCEDURE — 99214 OFFICE O/P EST MOD 30 MIN: CPT | Performed by: INTERNAL MEDICINE

## 2021-10-11 PROCEDURE — 84439 ASSAY OF FREE THYROXINE: CPT | Performed by: INTERNAL MEDICINE

## 2021-10-11 PROCEDURE — 83036 HEMOGLOBIN GLYCOSYLATED A1C: CPT | Performed by: INTERNAL MEDICINE

## 2021-10-11 PROCEDURE — 82947 ASSAY GLUCOSE BLOOD QUANT: CPT | Performed by: INTERNAL MEDICINE

## 2021-10-11 PROCEDURE — 80053 COMPREHEN METABOLIC PANEL: CPT | Performed by: INTERNAL MEDICINE

## 2021-10-11 PROCEDURE — 82306 VITAMIN D 25 HYDROXY: CPT | Performed by: INTERNAL MEDICINE

## 2021-10-11 PROCEDURE — 85025 COMPLETE CBC W/AUTO DIFF WBC: CPT | Performed by: INTERNAL MEDICINE

## 2021-10-11 PROCEDURE — 80061 LIPID PANEL: CPT | Performed by: INTERNAL MEDICINE

## 2021-10-11 PROCEDURE — 84443 ASSAY THYROID STIM HORMONE: CPT | Performed by: INTERNAL MEDICINE

## 2021-10-11 RX ORDER — FLUTICASONE PROPIONATE 50 MCG
2 SPRAY, SUSPENSION (ML) NASAL DAILY
Qty: 48 G | Refills: 1 | Status: SHIPPED | OUTPATIENT
Start: 2021-10-11 | End: 2022-06-13

## 2021-10-11 NOTE — PROGRESS NOTES
Buzz Elmore 61 y.o.  [facility]:Follow-up, Hypertension, Hyperlipidemia, Vitamin D Deficiency, and Hyperglycemia      [place]: Follow-up, Hypertension, Hyperlipidemia, Vitamin D Deficiency, and Hyperglycemia  ]bp is good  Is on low fat diet and crestor   Blood sugar and 90 day average sugar reviewed  Results for orders placed or performed in visit on 10/11/21   POC Glucose, Blood    Specimen: Blood   Result Value Ref Range    Glucose 117 70 - 130 mg/dL    Lot Number [id number]     Expiration Date 04/07/2022    POC Glycosylated Hemoglobin (Hb A1C)    Specimen: Blood   Result Value Ref Range    Hemoglobin A1C 6.3 %    Lot Number [id number]     Expiration Date 06/01/2023      Is on vitamin D supplement  Had stress test  Had colonoscopy - multiple polyps- due repeat 1 year    Allergies   Allergen Reactions   • Atorvastatin Myalgia       Current Outpatient Medications:   •  aspirin (ASPIRIN LOW DOSE) 81 MG tablet, Take 81 mg by mouth Daily., Disp: , Rfl:   •  carvedilol (COREG) 12.5 MG tablet, TAKE ONE TABLET BY MOUTH TWICE A DAY WITH MEALS, Disp: 60 tablet, Rfl: 5  •  Cholecalciferol (VITAMIN D) 2000 UNITS capsule, Take 2,000 Units by mouth Daily., Disp: , Rfl:   •  fenofibrate (TRICOR) 48 MG tablet, TAKE ONE TABLET BY MOUTH DAILY, Disp: 90 tablet, Rfl: 1  •  fluticasone (FLONASE) 50 MCG/ACT nasal spray, 2 sprays into the nostril(s) as directed by provider Daily., Disp: 48 g, Rfl: 1  •  mupirocin (BACTROBAN) 2 % ointment, Apply  topically to the appropriate area as directed 3 (Three) Times a Day., Disp: 30 g, Rfl: 1  •  pantoprazole (PROTONIX) 40 MG EC tablet, Take 1 tablet by mouth Daily., Disp: 90 tablet, Rfl: 3  •  prasugrel (EFFIENT) 10 MG tablet, TAKE ONE TABLET BY MOUTH DAILY, Disp: 90 tablet, Rfl: 3  •  rosuvastatin (CRESTOR) 40 MG tablet, TAKE ONE TABLET BY MOUTH DAILY, Disp: 90 tablet, Rfl: 1  •  tamsulosin (FLOMAX) 0.4 MG capsule 24 hr capsule, Take 1 capsule by mouth Every Night., Disp: 30 capsule, Rfl: 0  •   telmisartan-hydrochlorothiazide (Micardis HCT) 80-25 MG per tablet, Take 1 tablet by mouth Daily., Disp: 90 tablet, Rfl: 3  Patient Active Problem List    Diagnosis    • Colon polyps [K63.5]    • Abdominal wall cellulitis [L03.311]    • Left upper quadrant abdominal mass [R19.02]    • IHD (ischemic heart disease) [I25.9]    • Obesity [E66.9]    • GERD (gastroesophageal reflux disease) [K21.9]    • Chronic low back pain [M54.50, G89.29]    • Coagulation disorder (HCC) [D68.9]    • Benign prostatic hyperplasia with urinary obstruction [N40.1, N13.8]    • Chest pain [R07.9]    • Mechanical complication due to tissue graft [T85.698A]    • Atherosclerosis of coronary artery [I25.10]    • Hyperglycemia [R73.9]    • Gastroesophageal reflux disease [K21.9]    • Hyperlipidemia [E78.5]    • Hypertension [I10]    • Knee pain [M25.569]    • Lipoma [D17.9]    • Low back pain [M54.50]    • Calculus of kidney [N20.0]    • Muscle pain [M79.10]    • Vitamin D deficiency [E55.9]    • Acute serous otitis media [H65.00]    • 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   HENT: Negative for congestion and rhinorrhea.    Eyes: Negative for visual disturbance.   Respiratory: Negative for cough and shortness of breath.    Cardiovascular: Negative for palpitations and leg swelling.   Gastrointestinal: Negative for constipation, diarrhea and nausea.   Genitourinary: Negative for hematuria.   Musculoskeletal: Positive for arthralgias. Negative for back pain, gait problem, joint swelling and myalgias.   Skin: Negative for color change, rash and wound.   Allergic/Immunologic: Negative for environmental allergies, food allergies and immunocompromised state.   Neurological: Negative for dizziness, weakness and light-headedness.  "  Psychiatric/Behavioral: Negative for confusion, decreased concentration, dysphoric mood and sleep disturbance. The patient is not nervous/anxious.      Social History     Socioeconomic History   • Marital status:    Tobacco Use   • Smoking status: Never Smoker   • Smokeless tobacco: Never Used   Substance and Sexual Activity   • Alcohol use: Yes     Comment: occasional   • Drug use: No   • Sexual activity: Defer     Family History   Problem Relation Age of Onset   • Breast cancer Other    • Colon cancer Other    • Heart disease Other    • Breast cancer Mother    • Heart disease Mother    • Hypertension Mother    • Heart attack Mother    • Colon cancer Father    • Hypertension Father    • Heart attack Father    • Diabetes Father    • Heart disease Paternal Grandfather    • Heart disease Brother      /74   Pulse 74   Ht 185.4 cm (73\")   Wt 119 kg (262 lb 3.2 oz)   SpO2 98%   BMI 34.59 kg/m²   Physical Exam  Vitals and nursing note reviewed.   Constitutional:       Appearance: Normal appearance. He is well-developed.   HENT:      Head: Normocephalic and atraumatic.   Eyes:      General: Lids are normal.      Extraocular Movements: Extraocular movements intact.      Conjunctiva/sclera: Conjunctivae normal.      Pupils: Pupils are equal, round, and reactive to light.   Neck:      Thyroid: No thyroid mass or thyromegaly.      Vascular: No carotid bruit.      Trachea: Trachea normal. No tracheal deviation.   Cardiovascular:      Rate and Rhythm: Normal rate and regular rhythm.      Pulses: Normal pulses.      Heart sounds: Normal heart sounds. No murmur heard.  No friction rub. No gallop.    Pulmonary:      Effort: Pulmonary effort is normal. No respiratory distress.      Breath sounds: Normal breath sounds. No wheezing.   Musculoskeletal:         General: No deformity. Normal range of motion.      Cervical back: Normal range of motion and neck supple.   Lymphadenopathy:      Cervical: No cervical " adenopathy.   Skin:     General: Skin is warm and dry.      Findings: No erythema or rash.      Nails: There is no clubbing.   Neurological:      General: No focal deficit present.      Mental Status: He is alert and oriented to person, place, and time.      Cranial Nerves: No cranial nerve deficit.      Deep Tendon Reflexes: Reflexes are normal and symmetric. Reflexes normal.   Psychiatric:         Mood and Affect: Mood normal.         Speech: Speech normal.         Behavior: Behavior normal.         Thought Content: Thought content normal.         Judgment: Judgment normal.       Results for orders placed or performed in visit on 10/11/21   POC Glucose, Blood    Specimen: Blood   Result Value Ref Range    Glucose 117 70 - 130 mg/dL    Lot Number [id number]     Expiration Date 04/07/2022    POC Glycosylated Hemoglobin (Hb A1C)    Specimen: Blood   Result Value Ref Range    Hemoglobin A1C 6.3 %    Lot Number [id number]     Expiration Date 06/01/2023      Diagnoses and all orders for this visit:    1. Hyperglycemia (Primary)  Assessment & Plan:  Blood sugar and 90 day average sugar reviewed  Results for orders placed or performed in visit on 10/11/21   POC Glucose, Blood    Specimen: Blood   Result Value Ref Range    Glucose 117 70 - 130 mg/dL    Lot Number [id number]     Expiration Date 04/07/2022    POC Glycosylated Hemoglobin (Hb A1C)    Specimen: Blood   Result Value Ref Range    Hemoglobin A1C 6.3 %    Lot Number [id number]     Expiration Date 06/01/2023     still in prediabetic range  Discussed monitoring and cutting back on sweets, sugared drinks  Try to get 30 min of exercise daily   He is working on retiring again and should have more free time    Orders:  -     POC Glucose, Blood  -     POC Glycosylated Hemoglobin (Hb A1C)    2. Mixed hyperlipidemia  Assessment & Plan:  Check flp - taking crestor and low dose tricor   Recent normal stress testing     Orders:  -     Lipid Panel  -     TSH  -     T4, Free    3.  Primary hypertension  Assessment & Plan:  bp is well controlled today     Orders:  -     Comprehensive Metabolic Panel  -     CBC Auto Differential    4. Vitamin D deficiency  Assessment & Plan:  On supplement- update levels     Orders:  -     Vitamin D 25 Hydroxy    Other orders  -     fluticasone (FLONASE) 50 MCG/ACT nasal spray; 2 sprays into the nostril(s) as directed by provider Daily.  Dispense: 48 g; Refill: 1  healed abdominal wound- still scarring   Saw ID ?spider bite  Return in about 6 months (around 4/11/2022) for Recheck.    Maida Rey MD  Signed Maida Rey MD

## 2021-10-11 NOTE — ASSESSMENT & PLAN NOTE
Blood sugar and 90 day average sugar reviewed  Results for orders placed or performed in visit on 10/11/21   POC Glucose, Blood    Specimen: Blood   Result Value Ref Range    Glucose 117 70 - 130 mg/dL    Lot Number [id number]     Expiration Date 04/07/2022    POC Glycosylated Hemoglobin (Hb A1C)    Specimen: Blood   Result Value Ref Range    Hemoglobin A1C 6.3 %    Lot Number [id number]     Expiration Date 06/01/2023     still in prediabetic range  Discussed monitoring and cutting back on sweets, sugared drinks  Try to get 30 min of exercise daily   He is working on retiring again and should have more free time

## 2021-11-09 RX ORDER — CARVEDILOL 12.5 MG/1
TABLET ORAL
Qty: 60 TABLET | Refills: 5 | Status: SHIPPED | OUTPATIENT
Start: 2021-11-09 | End: 2022-05-02 | Stop reason: SDUPTHER

## 2021-12-02 RX ORDER — PANTOPRAZOLE SODIUM 40 MG/1
TABLET, DELAYED RELEASE ORAL
Qty: 90 TABLET | Refills: 1 | Status: SHIPPED | OUTPATIENT
Start: 2021-12-02 | End: 2022-05-31

## 2021-12-03 RX ORDER — FENOFIBRATE 48 MG/1
TABLET, COATED ORAL
Qty: 90 TABLET | Refills: 1 | Status: SHIPPED | OUTPATIENT
Start: 2021-12-03 | End: 2022-05-02

## 2021-12-11 DIAGNOSIS — I10 ESSENTIAL HYPERTENSION: ICD-10-CM

## 2021-12-13 RX ORDER — TELMISARTAN AND HYDROCHLORTHIAZIDE 80; 25 MG/1; MG/1
TABLET ORAL
Qty: 90 TABLET | Refills: 3 | Status: SHIPPED | OUTPATIENT
Start: 2021-12-13 | End: 2022-05-02 | Stop reason: SDUPTHER

## 2022-01-24 RX ORDER — PRASUGREL 10 MG/1
TABLET, FILM COATED ORAL
Qty: 90 TABLET | Refills: 3 | Status: SHIPPED | OUTPATIENT
Start: 2022-01-24 | End: 2022-05-02 | Stop reason: SDUPTHER

## 2022-03-04 RX ORDER — ROSUVASTATIN CALCIUM 40 MG/1
TABLET, COATED ORAL
Qty: 90 TABLET | Refills: 0 | Status: SHIPPED | OUTPATIENT
Start: 2022-03-04 | End: 2022-05-02 | Stop reason: SDUPTHER

## 2022-05-01 NOTE — PROGRESS NOTES
[facility]  Subjective:     Encounter Date: 05/02/2022      Patient ID: Buzz Elmore is a 61 y.o. male.    Chief Complaint: IHD (ischemic heart disease)      PROBLEM LIST:  1. Ischemic heart disease  a. [facility] for ACS, 07/28/2011: PTCA/PCI to proximal RCA. EF 60%.  b. Stress Test, 12/02/2016: EF > 70%. No ischemia  c. [facility] 09/17/2021: EF > 70%. Normal study.   2. Hyperlipidemia  3. Hypertension  4. Chronic low back pain  5. GERD  6. Obesity      History of Present Illness  Buzz Elmore returns today for a one year follow up with a history of ischemic heart disease and cardiac risk factors. Since last visit, the patient has been doing well overall from a cardiovascular standpoint. He complains he has some knee discomfort in both knees and may need a . 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 He does report he still has easy bruising and bleeding. Patient denies chest pain, shortness of breath, orthopnea, palpitations, edema, dizziness, and syncope.       Allergies   Allergen Reactions   • Atorvastatin Myalgia         Current Outpatient Medications:   •  aspirin 81 MG tablet, Take 81 mg by mouth Daily., Disp: , Rfl:   •  carvedilol (COREG) 12.5 MG tablet, TAKE ONE TABLET BY MOUTH TWICE A DAY WITH MEALS, Disp: 60 tablet, Rfl: 5  •  Cholecalciferol (VITAMIN D) 2000 UNITS capsule, Take 2,000 Units by mouth Daily., Disp: , Rfl:   •  fenofibrate (TRICOR) 48 MG tablet, TAKE ONE TABLET BY MOUTH DAILY, Disp: 90 tablet, Rfl: 1  •  fluticasone (FLONASE) 50 MCG/ACT nasal spray, 2 sprays into the nostril(s) as directed by provider Daily., Disp: 48 g, Rfl: 1  •  mupirocin (BACTROBAN) 2 % ointment, Apply  topically to the appropriate area as directed 3 (Three) Times a Day., Disp: 30 g, Rfl: 1  •  pantoprazole (PROTONIX) 40 MG EC tablet, TAKE ONE TABLET BY MOUTH DAILY, Disp: 90 tablet, Rfl: 1  •  prasugrel (EFFIENT) 10 MG tablet, TAKE ONE TABLET BY MOUTH  "DAILY, Disp: 90 tablet, Rfl: 3  •  rosuvastatin (CRESTOR) 40 MG tablet, TAKE ONE TABLET BY MOUTH DAILY, Disp: 90 tablet, Rfl: 0  •  tamsulosin (FLOMAX) 0.4 MG capsule 24 hr capsule, Take 1 capsule by mouth Every Night., Disp: 30 capsule, Rfl: 0  •  telmisartan-hydrochlorothiazide (MICARDIS HCT) 80-25 MG per tablet, TAKE ONE TABLET BY MOUTH DAILY, Disp: 90 tablet, Rfl: 3    The following portions of the patient's history were reviewed and updated as appropriate: allergies, current medications, past family history, past medical history, past social history, past surgical history and problem list.    Review of Systems   Constitutional: Negative.   Cardiovascular: Negative for chest pain, dyspnea on exertion, leg swelling, palpitations and syncope.   Respiratory: Negative.  Negative for shortness of breath.    Hematologic/Lymphatic: Negative for bleeding problem. Does not bruise/bleed easily.   Skin: Negative for rash.   Musculoskeletal: Negative for muscle weakness and myalgias.   Gastrointestinal: Negative for heartburn, nausea and vomiting.   Neurological: Negative for dizziness, light-headedness, loss of balance and numbness.          Objective:     Vitals:    05/02/22 1627   BP: 136/88   BP Location: Right arm   Patient Position: Sitting   Pulse: 61   SpO2: 98%   Weight: 122 kg (269 lb)   Height: 185.4 cm (73\")         Constitutional:       Appearance: Well-developed.   Neck:      Thyroid: No thyromegaly.      Vascular: No carotid bruit or JVD.   Pulmonary:      Breath sounds: Normal breath sounds.   Cardiovascular:      Regular rhythm.      No gallop. No S3 and S4 gallop.   Edema:     Peripheral edema absent.   Abdominal:      General: Bowel sounds are normal.      Palpations: Abdomen is soft. There is no abdominal mass.      Tenderness: There is no abdominal tenderness.   Skin:     General: Skin is warm and dry.      Findings: No rash.   Neurological:      Mental Status: Alert and oriented to person, place, and " time.         Lab Review:  Lab Results   Component Value Date    GLUCOSE 121 (H) 10/11/2021    BUN 18 10/11/2021    CREATININE 1.19 10/11/2021    EGFRIFNONA 63 10/11/2021    BCR 15.1 10/11/2021    K 4.0 10/11/2021    CO2 24.7 10/11/2021    CALCIUM 9.8 10/11/2021    ALBUMIN 4.50 10/11/2021    ALKPHOS 48 10/11/2021    AST 23 10/11/2021    ALT 25 10/11/2021     Lab Results   Component Value Date    CHOL 146 10/11/2021    TRIG 143 10/11/2021    HDL 41 10/11/2021    LDL 80 10/11/2021      Lab Results   Component Value Date    WBC 8.43 10/11/2021    RBC 4.65 10/11/2021    HGB 14.0 10/11/2021    HCT 40.5 10/11/2021    MCV 87.1 10/11/2021     10/11/2021     Lab Results   Component Value Date    TSH 2.830 10/11/2021     Lab Results   Component Value Date    HGBA1C 6.3 10/11/2021          ECG 12 Lead    Date/Time: 5/2/2022 4:54 PM  Performed by: Db Kitchen MD  Authorized by: Db Kitchen MD   Previous ECG: no previous ECG available  Rhythm: sinus rhythm  BPM: 60    Clinical impression: abnormal EKG  Comments: Possible inferior infarct                Assessment:   Diagnoses and all orders for this visit:    1. IHD (ischemic heart disease) (Primary)    2. Essential hypertension    3. Mixed hyperlipidemia        Impression:  1. Ischemic heart disease. Stable and asymptomatic. If patient starts to have more complications with bruising and bleeding, he was advised he can discontinue Effient 10 mg for a few days. Continue on aspirin 81 mg.   2. Essential hypertension. Well controlled. Continue on carvedilol 12.5 mg and micardis hct 80-25 mg   3. Mixed hyperlipidemia. Well controlled. Discontinue fenofibrate 48 mg and continue on rosuvastatin 40 mg.     Plan:  1. Stable cardiac status  2. If patient starts to have more complications with bruising and bleeding, he was advised he can discontinue Effient 10 mg for a few days to help alleviate the symptoms.   3. Discontinue fenofibrate 48 mg to possibly help alleviate  muscle and bone discomfort.   4. Patient is low risk for upcoming knee procedure from a cardiac standpoint.   5. Continue current medications.  6. Revisit in 12 MO, or sooner as needed.      Scribed for Db Kitchen MD by Anna Bob. 5/2/2022 16:40 EDT      Db Kitchen MD      Please note that portions of this note may have been completed with a voice recognition program. Efforts were made to edit the dictations, but occasionally words are mistranscribed.

## 2022-05-02 ENCOUNTER — OFFICE VISIT (OUTPATIENT)
Dept: CARDIOLOGY | Facility: CLINIC | Age: 62
End: 2022-05-02

## 2022-05-02 VITALS
SYSTOLIC BLOOD PRESSURE: 136 MMHG | WEIGHT: 269 LBS | HEART RATE: 61 BPM | BODY MASS INDEX: 35.65 KG/M2 | OXYGEN SATURATION: 98 % | DIASTOLIC BLOOD PRESSURE: 88 MMHG | HEIGHT: 73 IN

## 2022-05-02 DIAGNOSIS — I25.9 IHD (ISCHEMIC HEART DISEASE): Primary | ICD-10-CM

## 2022-05-02 DIAGNOSIS — E78.2 MIXED HYPERLIPIDEMIA: ICD-10-CM

## 2022-05-02 DIAGNOSIS — I10 ESSENTIAL HYPERTENSION: ICD-10-CM

## 2022-05-02 PROCEDURE — 99214 OFFICE O/P EST MOD 30 MIN: CPT | Performed by: INTERNAL MEDICINE

## 2022-05-02 PROCEDURE — 93000 ELECTROCARDIOGRAM COMPLETE: CPT | Performed by: INTERNAL MEDICINE

## 2022-05-02 RX ORDER — PRASUGREL 10 MG/1
10 TABLET, FILM COATED ORAL DAILY
Qty: 90 TABLET | Refills: 3 | Status: ON HOLD | OUTPATIENT
Start: 2022-05-02 | End: 2023-02-14 | Stop reason: SDUPTHER

## 2022-05-02 RX ORDER — CARVEDILOL 12.5 MG/1
12.5 TABLET ORAL 2 TIMES DAILY WITH MEALS
Qty: 60 TABLET | Refills: 5 | Status: SHIPPED | OUTPATIENT
Start: 2022-05-02 | End: 2022-10-31

## 2022-05-02 RX ORDER — ROSUVASTATIN CALCIUM 40 MG/1
40 TABLET, COATED ORAL DAILY
Qty: 90 TABLET | Refills: 0 | Status: SHIPPED | OUTPATIENT
Start: 2022-05-02 | End: 2022-08-29

## 2022-05-02 RX ORDER — TELMISARTAN AND HYDROCHLORTHIAZIDE 80; 25 MG/1; MG/1
1 TABLET ORAL DAILY
Qty: 90 TABLET | Refills: 3 | Status: SHIPPED | OUTPATIENT
Start: 2022-05-02

## 2022-05-31 RX ORDER — PANTOPRAZOLE SODIUM 40 MG/1
TABLET, DELAYED RELEASE ORAL
Qty: 90 TABLET | Refills: 0 | Status: SHIPPED | OUTPATIENT
Start: 2022-05-31 | End: 2022-11-14

## 2022-06-02 RX ORDER — FENOFIBRATE 48 MG/1
TABLET, COATED ORAL
Qty: 90 TABLET | OUTPATIENT
Start: 2022-06-02

## 2022-06-13 ENCOUNTER — OFFICE VISIT (OUTPATIENT)
Dept: ORTHOPEDIC SURGERY | Facility: CLINIC | Age: 62
End: 2022-06-13

## 2022-06-13 VITALS
BODY MASS INDEX: 35.17 KG/M2 | HEIGHT: 73 IN | DIASTOLIC BLOOD PRESSURE: 90 MMHG | WEIGHT: 265.4 LBS | SYSTOLIC BLOOD PRESSURE: 139 MMHG

## 2022-06-13 DIAGNOSIS — M17.0 PRIMARY OSTEOARTHRITIS OF BOTH KNEES: Primary | ICD-10-CM

## 2022-06-13 PROCEDURE — 20610 DRAIN/INJ JOINT/BURSA W/O US: CPT | Performed by: ORTHOPAEDIC SURGERY

## 2022-06-13 PROCEDURE — 99214 OFFICE O/P EST MOD 30 MIN: CPT | Performed by: ORTHOPAEDIC SURGERY

## 2022-06-13 RX ORDER — TRIAMCINOLONE ACETONIDE 40 MG/ML
40 INJECTION, SUSPENSION INTRA-ARTICULAR; INTRAMUSCULAR
Status: COMPLETED | OUTPATIENT
Start: 2022-06-13 | End: 2022-06-13

## 2022-06-13 RX ORDER — ROPIVACAINE HYDROCHLORIDE 5 MG/ML
4 INJECTION, SOLUTION EPIDURAL; INFILTRATION; PERINEURAL
Status: COMPLETED | OUTPATIENT
Start: 2022-06-13 | End: 2022-06-13

## 2022-06-13 RX ORDER — FLUTICASONE PROPIONATE 50 MCG
SPRAY, SUSPENSION (ML) NASAL
Qty: 48 ML | Refills: 1 | Status: SHIPPED | OUTPATIENT
Start: 2022-06-13 | End: 2022-10-18 | Stop reason: SDUPTHER

## 2022-06-13 RX ADMIN — ROPIVACAINE HYDROCHLORIDE 4 ML: 5 INJECTION, SOLUTION EPIDURAL; INFILTRATION; PERINEURAL at 16:27

## 2022-06-13 RX ADMIN — TRIAMCINOLONE ACETONIDE 40 MG: 40 INJECTION, SUSPENSION INTRA-ARTICULAR; INTRAMUSCULAR at 16:27

## 2022-06-13 NOTE — PROGRESS NOTES
[facility] [place] [facility] Note    Subjective     Chief Complaint   Patient presents with   • Right Knee - Pain   • Follow-up     1.5 year follow up -- Primary osteoarthritis of left knee         HPI    It has been 1.5  year(s) since Mr. Elmore's last visit. He returns to clinic today for follow-up of left knee arthritis. The issue has been ongoing for 3 year(s). He rates his pain a 8/10 on the pain scale. Previous/current treatments: bracing and NSAIDS. Current symptoms: same as prior visit. The pain is worse with walking, standing, climbing stairs and rising from seated position; resting and sitting improve the pain. Overall, he is doing worse.     Buzz Elmore is a 61 y.o. male who presents with new problem of: right knee pain.  Onset: atraumatic and gradual in nature. The issue has been ongoing for 3 month(s). Pain is a 4/10 on the pain scale. Pain is described as throbbing. Associated symptoms include pain. The pain is worse with riding and getting out of truck ; moving around improve the pain. Previous treatments have included: bracing.    He is interested in injections today.  He may be interested in surgery early next year.    I have reviewed the following portions of the patient's history and agree with: History of Present Illness and Review of Systems    Patient Active Problem List   Diagnosis   • Coagulation disorder (HCC)   • Benign prostatic hyperplasia with urinary obstruction   • Chest pain   • Mechanical complication due to tissue graft   • Atherosclerosis of coronary artery   • Hyperglycemia   • Gastroesophageal reflux disease   • Hyperlipidemia   • Hypertension   • Knee pain   • Lipoma   • Low back pain   • Calculus of kidney   • Muscle pain   • Vitamin D deficiency   • IHD (ischemic heart disease)   • Obesity   • GERD (gastroesophageal reflux disease)   • Chronic low back pain   • Acute serous otitis media   • Mixed conductive and sensorineural hearing loss   • Bilateral  sensorineural hearing loss   • Otitis media of left ear   • Left upper quadrant abdominal mass   • Abdominal wall cellulitis   • Colon polyps     Past Medical History:   Diagnosis Date   • Chronic low back pain    • GERD (gastroesophageal reflux disease)    • Hyperlipidemia    • Hypertension    • IHD (ischemic heart disease)    • Obesity       Past Surgical History:   Procedure Laterality Date   • CORONARY STENT PLACEMENT     • HAND SURGERY      skin graft on finger   • KNEE ARTHROTOMY      History of Arthrotomy Of Knee With Medial Meniscectomy   • KNEE SURGERY Right     meniscus      Family History   Problem Relation Age of Onset   • Breast cancer Other    • Colon cancer Other    • Heart disease Other    • Breast cancer Mother    • Heart disease Mother    • Hypertension Mother    • Heart attack Mother    • Colon cancer Father    • Hypertension Father    • Heart attack Father    • Diabetes Father    • Heart disease Paternal Grandfather    • Heart disease Brother      Social History     Socioeconomic History   • Marital status:    Tobacco Use   • Smoking status: Never Smoker   • Smokeless tobacco: Never Used   Vaping Use   • Vaping Use: Never used   Substance and Sexual Activity   • Alcohol use: Yes     Comment: occasional   • Drug use: No   • Sexual activity: Defer      Current Outpatient Medications on File Prior to Visit   Medication Sig Dispense Refill   • aspirin 81 MG tablet Take 81 mg by mouth Daily.     • carvedilol (COREG) 12.5 MG tablet Take 1 tablet by mouth 2 (Two) Times a Day With Meals. 60 tablet 5   • Cholecalciferol (VITAMIN D) 2000 UNITS capsule Take 2,000 Units by mouth Daily.     • fluticasone (FLONASE) 50 MCG/ACT nasal spray SPRAY TWO SPRAYS IN EACH NOSTRIL ONCE DAILY 48 mL 1   • mupirocin (BACTROBAN) 2 % ointment Apply  topically to the appropriate area as directed 3 (Three) Times a Day. 30 g 1   • pantoprazole (PROTONIX) 40 MG EC tablet TAKE ONE TABLET BY MOUTH DAILY 90 tablet 0   •  prasugrel (EFFIENT) 10 MG tablet Take 1 tablet by mouth Daily. 90 tablet 3   • rosuvastatin (CRESTOR) 40 MG tablet Take 1 tablet by mouth Daily. 90 tablet 0   • tamsulosin (FLOMAX) 0.4 MG capsule 24 hr capsule Take 1 capsule by mouth Every Night. 30 capsule 0   • telmisartan-hydrochlorothiazide (MICARDIS HCT) 80-25 MG per tablet Take 1 tablet by mouth Daily. 90 tablet 3   • [DISCONTINUED] fluticasone (FLONASE) 50 MCG/ACT nasal spray 2 sprays into the nostril(s) as directed by provider Daily. 48 g 1     No current facility-administered medications on file prior to visit.      Allergies   Allergen Reactions   • Atorvastatin Myalgia        Review of Systems   Constitutional: Negative for activity change, appetite change, chills, diaphoresis, fatigue, fever and unexpected weight change.   HENT: Negative for congestion, dental problem, drooling, ear discharge, ear pain, facial swelling, hearing loss, mouth sores, nosebleeds, postnasal drip, rhinorrhea, sinus pressure, sneezing, sore throat, tinnitus, trouble swallowing and voice change.    Eyes: Negative for photophobia, pain, discharge, redness, itching and visual disturbance.   Respiratory: Negative for apnea, cough, choking, chest tightness, shortness of breath, wheezing and stridor.    Cardiovascular: Negative for chest pain, palpitations and leg swelling.   Gastrointestinal: Negative for abdominal distention, abdominal pain, anal bleeding, blood in stool, constipation, diarrhea, nausea, rectal pain and vomiting.   Endocrine: Negative for cold intolerance, heat intolerance, polydipsia, polyphagia and polyuria.   Genitourinary: Negative for decreased urine volume, difficulty urinating, dysuria, enuresis, flank pain, frequency, genital sores, hematuria and urgency.   Musculoskeletal: Positive for arthralgias. Negative for back pain, gait problem, joint swelling, myalgias, neck pain and neck stiffness.   Skin: Negative for color change, pallor, rash and wound.  "  Allergic/Immunologic: Negative for environmental allergies, food allergies and immunocompromised state.   Neurological: Negative for dizziness, tremors, seizures, syncope, facial asymmetry, speech difficulty, weakness, light-headedness, numbness and headaches.   Hematological: Negative for adenopathy. Does not bruise/bleed easily.   Psychiatric/Behavioral: Negative for agitation, behavioral problems, confusion, decreased concentration, dysphoric mood, hallucinations, self-injury, sleep disturbance and suicidal ideas. The patient is not nervous/anxious and is not hyperactive.         Objective      Physical Exam  /90   Ht 185.4 cm (72.99\")   Wt 120 kg (265 lb 6.4 oz)   BMI 35.02 kg/m²     Body mass index is 35.02 kg/m².    General:   Mental Status:  Alert   Appearance: Cooperative, in no acute distress   Build and Nutrition: Overweight by BMI male   Orientation: Alert and oriented to person, place and time   Posture: Normal   Gait: Nonantalgic    Integument:   Right knee: no skin lesions, no rash, no ecchymosis   Left knee: no skin lesions, no rash, no ecchymosis    Lower Extremities:   Right Knee:    Tenderness:  Medial joint line tenderness    Effusion:  None    Swelling:  None    Crepitus:  Positive    Atrophy:  None    Range of motion:  Extension: 0°       Flexion: 120°  Instability:  No varus laxity, no valgus laxity, negative anterior drawer  Deformities:  Varus   Left Knee:    Tenderness:  Medial and lateral joint line tenderness    Effusion:  None    Swelling:  None    Crepitus: Positive    Atrophy:  None    Range of motion:  Extension: 0°       Flexion: 120°  Instability:  No varus laxity, no valgus laxity, negative anterior drawer  Deformities:  Varus      Imaging/Studies  Imaging Results (Last 24 Hours)     Procedure Component Value Units Date/Time    XR Knee 4+ View Bilateral [[id number]] Resulted: 06/13/22 1621     Updated: 06/13/22 1623    Narrative:      Right Knee Radiographs  Indication: " right knee pain  Views: Standing AP's and skiers of both knees, with lateral and sunrise   views of the right knee    Comparison: 4/8/2019    Findings:   Near bone-on-bone contact medial compartment, tricompartmental   osteophytes, varus alignment, no acute bony disease.  No unusual bony   features.  Stable compared to previous imaging.    Left Knee Radiographs  Indication: left knee pain  Views: Standing AP's and skiers of both knees, with lateral and sunrise   views of the left knee    Comparison: 9/21/2020    Findings:   Bone-on-bone contact medial compartment, tricompartmental osteophytes,   varus alignment, no acute bony abnormalities.  Worsening compared to   previous imaging.            Assessment and Plan     Diagnoses and all orders for this visit:    1. Primary osteoarthritis of both knees (Primary)  -     XR Knee 4+ View Bilateral  -     - Large Joint Arthrocentesis: bilateral knee        1. Primary osteoarthritis of both knees        I reviewed my findings with the patient.  He would like injections for both of his knees, and these were provided.  He may be interested in surgery early next year.  I will see him back in 4 months, but sooner for any problems.  He does understand the limitations of the injections going forward.    Procedure Note:  The potential benefits of performing a therapeutic bilateral knee joint injections, as well as potential risks (including, but not limited to infection, swelling, pain, bleeding, bruising, nerve/blood vessel damage, skin color changes, transient elevation in blood glucose levels, and fat atrophy) were discussed with the patient.  After informed consent, timeout procedure was performed, and the skin on the right and left knees was prepped with chlorhexidine soap and alcohol, after which ethyl chloride was applied to the skin at the injection site. Via the anterolateral approach, 1ml of Kenalog 40mg/ml mixed with 4ml 0.5% ropivacaine plain was injected into the knee  joints.  The patient tolerated the procedures well, experiencing 100% improvement in the right knee and 100% improvement in the left knee a few minutes following the injections. There were no complications.  Band-Aids were applied to the injection sites. Post-procedural instructions were given to the patient and/or their caregiver.      Return in about 4 months (around 10/13/2022).      Prince Castellanos MD  06/13/22  16:46 EDT

## 2022-06-13 NOTE — PROGRESS NOTES
Procedure   - Large Joint Arthrocentesis: bilateral knee on 6/13/2022 4:27 PM  Indications: pain  Details: 22 G needle, anterolateral approach  Medications (Right): 4 mL ropivacaine 0.5 %; 40 mg triamcinolone acetonide 40 MG/ML  Medications (Left): 4 mL ropivacaine 0.5 %; 40 mg triamcinolone acetonide 40 MG/ML  Outcome: tolerated well, no immediate complications  Procedure, treatment alternatives, risks and benefits explained, specific risks discussed. Consent was given by the patient. Immediately prior to procedure a time out was called to verify the correct patient, procedure, equipment, support staff and site/side marked as required. Patient was prepped and draped in the usual sterile fashion.

## 2022-08-29 RX ORDER — ROSUVASTATIN CALCIUM 40 MG/1
TABLET, COATED ORAL
Qty: 90 TABLET | Refills: 0 | Status: SHIPPED | OUTPATIENT
Start: 2022-08-29 | End: 2022-11-28

## 2022-09-06 ENCOUNTER — TELEPHONE (OUTPATIENT)
Dept: ORTHOPEDIC SURGERY | Facility: CLINIC | Age: 62
End: 2022-09-06

## 2022-09-06 RX ORDER — MELOXICAM 15 MG/1
15 TABLET ORAL DAILY
Qty: 30 TABLET | Refills: 2 | Status: SHIPPED | OUTPATIENT
Start: 2022-09-06 | End: 2022-12-07 | Stop reason: SDUPTHER

## 2022-10-17 ENCOUNTER — OFFICE VISIT (OUTPATIENT)
Dept: ORTHOPEDIC SURGERY | Facility: CLINIC | Age: 62
End: 2022-10-17

## 2022-10-17 VITALS
DIASTOLIC BLOOD PRESSURE: 81 MMHG | BODY MASS INDEX: 35.31 KG/M2 | SYSTOLIC BLOOD PRESSURE: 122 MMHG | HEIGHT: 73 IN | WEIGHT: 266.4 LBS

## 2022-10-17 DIAGNOSIS — M17.0 PRIMARY OSTEOARTHRITIS OF BOTH KNEES: Primary | ICD-10-CM

## 2022-10-17 PROCEDURE — 20610 DRAIN/INJ JOINT/BURSA W/O US: CPT | Performed by: ORTHOPAEDIC SURGERY

## 2022-10-17 RX ORDER — TRIAMCINOLONE ACETONIDE 40 MG/ML
40 INJECTION, SUSPENSION INTRA-ARTICULAR; INTRAMUSCULAR
Status: COMPLETED | OUTPATIENT
Start: 2022-10-17 | End: 2022-10-17

## 2022-10-17 RX ORDER — ROPIVACAINE HYDROCHLORIDE 5 MG/ML
4 INJECTION, SOLUTION EPIDURAL; INFILTRATION; PERINEURAL
Status: COMPLETED | OUTPATIENT
Start: 2022-10-17 | End: 2022-10-17

## 2022-10-17 RX ADMIN — TRIAMCINOLONE ACETONIDE 40 MG: 40 INJECTION, SUSPENSION INTRA-ARTICULAR; INTRAMUSCULAR at 16:28

## 2022-10-17 RX ADMIN — ROPIVACAINE HYDROCHLORIDE 4 ML: 5 INJECTION, SOLUTION EPIDURAL; INFILTRATION; PERINEURAL at 16:28

## 2022-10-17 NOTE — PROGRESS NOTES
Procedure   - Large Joint Arthrocentesis: bilateral knee on 10/17/2022 4:28 PM  Indications: pain  Details: 22 G needle  Medications (Right): 4 mL ropivacaine 0.5 %; 40 mg triamcinolone acetonide 40 MG/ML  Medications (Left): 4 mL ropivacaine 0.5 %; 40 mg triamcinolone acetonide 40 MG/ML  Outcome: tolerated well, no immediate complications  Procedure, treatment alternatives, risks and benefits explained, specific risks discussed. Consent was given by the patient. Immediately prior to procedure a time out was called to verify the correct patient, procedure, equipment, support staff and site/side marked as required. Patient was prepped and draped in the usual sterile fashion.

## 2022-10-17 NOTE — PROGRESS NOTES
[facility] [place] [facility] [place]    Subjective     Chief Complaint   Patient presents with   • Follow-up     4 month f/u Primary osteoarthritis of both knees        HPI    It has been 4  month(s) since Mr. Elmore's last visit. He returns to clinic today for follow-up of bilateral knee arthritis. The issue has been ongoing for 3 year(s). He rates his pain a 8/10 on the pain scale. Previous/current treatments: bracing, physical therapy and steroid injection (last injection 6/13/22). Current symptoms: same as prior visit. The pain is worse with walking, climbing stairs and lying on affected side; pain medication and/or NSAID improve the pain. Overall, he is doing the same.  He would like injections today.  He is not yet interested in surgical intervention.  Previous injections have provided relief.    I have reviewed the following portions of the patient's history and agree with: History of Present Illness and Review of Systems    Patient Active Problem List   Diagnosis   • Coagulation disorder (HCC)   • Benign prostatic hyperplasia with urinary obstruction   • Chest pain   • Mechanical complication due to tissue graft   • Atherosclerosis of coronary artery   • Hyperglycemia   • Gastroesophageal reflux disease   • Hyperlipidemia   • Hypertension   • Knee pain   • Lipoma   • Low back pain   • Calculus of kidney   • Muscle pain   • Vitamin D deficiency   • IHD (ischemic heart disease)   • Obesity   • GERD (gastroesophageal reflux disease)   • Chronic low back pain   • Acute serous otitis media   • Mixed conductive and sensorineural hearing loss   • Bilateral sensorineural hearing loss   • Otitis media of left ear   • Left upper quadrant abdominal mass   • Abdominal wall cellulitis   • Colon polyps     Past Medical History:   Diagnosis Date   • Chronic low back pain    • GERD (gastroesophageal reflux disease)    • Hyperlipidemia    • Hypertension    • IHD (ischemic heart disease)    • Obesity       Past Surgical  History:   Procedure Laterality Date   • CORONARY STENT PLACEMENT     • HAND SURGERY      skin graft on finger   • KNEE ARTHROTOMY      History of Arthrotomy Of Knee With Medial Meniscectomy   • KNEE SURGERY Right     meniscus      Family History   Problem Relation Age of Onset   • Breast cancer Other    • Colon cancer Other    • Heart disease Other    • Breast cancer Mother    • Heart disease Mother    • Hypertension Mother    • Heart attack Mother    • Colon cancer Father    • Hypertension Father    • Heart attack Father    • Diabetes Father    • Heart disease Paternal Grandfather    • Heart disease Brother      Social History     Socioeconomic History   • Marital status:    Tobacco Use   • Smoking status: Never   • Smokeless tobacco: Never   Vaping Use   • Vaping Use: Never used   Substance and Sexual Activity   • Alcohol use: Yes     Comment: occasional   • Drug use: No   • Sexual activity: Defer      Current Outpatient Medications on File Prior to Visit   Medication Sig Dispense Refill   • aspirin 81 MG tablet Take 81 mg by mouth Daily.     • carvedilol (COREG) 12.5 MG tablet Take 1 tablet by mouth 2 (Two) Times a Day With Meals. 60 tablet 5   • Cholecalciferol (VITAMIN D) 2000 UNITS capsule Take 2,000 Units by mouth Daily.     • fluticasone (FLONASE) 50 MCG/ACT nasal spray SPRAY TWO SPRAYS IN EACH NOSTRIL ONCE DAILY 48 mL 1   • meloxicam (MOBIC) 15 MG tablet Take 1 tablet by mouth Daily. 30 tablet 2   • mupirocin (BACTROBAN) 2 % ointment Apply  topically to the appropriate area as directed 3 (Three) Times a Day. 30 g 1   • pantoprazole (PROTONIX) 40 MG EC tablet TAKE ONE TABLET BY MOUTH DAILY 90 tablet 0   • prasugrel (EFFIENT) 10 MG tablet Take 1 tablet by mouth Daily. 90 tablet 3   • rosuvastatin (CRESTOR) 40 MG tablet TAKE ONE TABLET BY MOUTH DAILY 90 tablet 0   • tamsulosin (FLOMAX) 0.4 MG capsule 24 hr capsule Take 1 capsule by mouth Every Night. 30 capsule 0   • telmisartan-hydrochlorothiazide  "(MICARDIS HCT) 80-25 MG per tablet Take 1 tablet by mouth Daily. 90 tablet 3     No current facility-administered medications on file prior to visit.      Allergies   Allergen Reactions   • Atorvastatin Myalgia        Review of Systems   Musculoskeletal: Positive for arthralgias.   All other systems reviewed and are negative.       Objective      Physical Exam  /81   Ht 185.4 cm (72.99\")   Wt 121 kg (266 lb 6.4 oz)   BMI 35.15 kg/m²     Body mass index is 35.15 kg/m².    General:   Mental Status:  Alert   Appearance: Cooperative, in no acute distress   Build and Nutrition: Overweight by BMI male   Orientation: Alert and oriented to person, place and time   Posture: Normal   Gait: Nonantalgic    Integument:              Right knee: no skin lesions, no rash, no ecchymosis              Left knee: no skin lesions, no rash, no ecchymosis     Lower Extremities:              Right Knee:                          Tenderness:    Medial joint line tenderness                          Effusion:          None                          Swelling:          None                          Crepitus:          Positive                          Atrophy:           None                          Range of motion:        Extension:       0°                                                              Flexion:           120°  Instability:        No varus laxity, no valgus laxity, negative anterior drawer  Deformities:     Varus              Left Knee:                          Tenderness:    Medial joint line tenderness                          Effusion:          None                          Swelling:          None                          Crepitus:          Positive                          Atrophy:           None                          Range of motion:        Extension:       0°                                                              Flexion:           120°  Instability:        No varus laxity, no valgus laxity, negative " anterior drawer  Deformities:     Varus    Imaging/Studies  Imaging Results (Last 24 Hours)     ** No results found for the last 24 hours. **        No new imaging today.    Assessment and Plan     Diagnoses and all orders for this visit:    1. Primary osteoarthritis of both knees (Primary)  -     - Large Joint Arthrocentesis: bilateral knee        1. Primary osteoarthritis of both knees        I reviewed my findings with the patient.  He continues to have bilateral knee pain, and would like injections today.  He is not yet interested in surgical intervention.  I will see him back in 4 months, but sooner for any problems.    Procedure Note:  The potential benefits of performing a therapeutic bilateral knee joint injections, as well as potential risks (including, but not limited to infection, swelling, pain, bleeding, bruising, nerve/blood vessel damage, skin color changes, transient elevation in blood glucose levels, and fat atrophy) were discussed with the patient.  After informed consent, timeout procedure was performed, and the skin on the right and left knees was prepped with chlorhexidine soap and alcohol, after which ethyl chloride was applied to the skin at the injection site. Via the anterolateral approach, 1ml of Kenalog 40mg/ml mixed with 4ml 0.5% ropivacaine plain was injected into the knee joints.  The patient tolerated the procedures well, experiencing 100% improvement in the right knee and 100% improvement in the left knee a few minutes following the injections. There were no complications.  Band-Aids were applied to the injection sites. Post-procedural instructions were given to the patient and/or their caregiver.      Return in about 4 months (around 2/17/2023).      Prince Castellanos MD  10/17/22  17:13 EDT

## 2022-10-18 ENCOUNTER — OFFICE VISIT (OUTPATIENT)
Dept: ENDOCRINOLOGY | Facility: CLINIC | Age: 62
End: 2022-10-18

## 2022-10-18 ENCOUNTER — LAB (OUTPATIENT)
Dept: LAB | Facility: HOSPITAL | Age: 62
End: 2022-10-18

## 2022-10-18 VITALS
OXYGEN SATURATION: 98 % | SYSTOLIC BLOOD PRESSURE: 128 MMHG | HEART RATE: 85 BPM | HEIGHT: 73 IN | BODY MASS INDEX: 35.78 KG/M2 | DIASTOLIC BLOOD PRESSURE: 80 MMHG | WEIGHT: 270 LBS

## 2022-10-18 DIAGNOSIS — Z12.5 SCREENING FOR PROSTATE CANCER: ICD-10-CM

## 2022-10-18 DIAGNOSIS — E55.9 VITAMIN D DEFICIENCY: ICD-10-CM

## 2022-10-18 DIAGNOSIS — E78.2 MIXED HYPERLIPIDEMIA: ICD-10-CM

## 2022-10-18 DIAGNOSIS — R73.9 HYPERGLYCEMIA: Primary | ICD-10-CM

## 2022-10-18 DIAGNOSIS — I10 PRIMARY HYPERTENSION: ICD-10-CM

## 2022-10-18 LAB
25(OH)D3 SERPL-MCNC: 49.5 NG/ML (ref 30–100)
ALBUMIN SERPL-MCNC: 4.2 G/DL (ref 3.5–5.2)
ALBUMIN/GLOB SERPL: 1.4 G/DL
ALP SERPL-CCNC: 59 U/L (ref 39–117)
ALT SERPL W P-5'-P-CCNC: 28 U/L (ref 1–41)
ANION GAP SERPL CALCULATED.3IONS-SCNC: 13.4 MMOL/L (ref 5–15)
AST SERPL-CCNC: 26 U/L (ref 1–40)
BILIRUB SERPL-MCNC: 0.7 MG/DL (ref 0–1.2)
BUN SERPL-MCNC: 22 MG/DL (ref 8–23)
BUN/CREAT SERPL: 23.2 (ref 7–25)
CALCIUM SPEC-SCNC: 9.6 MG/DL (ref 8.6–10.5)
CHLORIDE SERPL-SCNC: 103 MMOL/L (ref 98–107)
CHOLEST SERPL-MCNC: 157 MG/DL (ref 0–200)
CO2 SERPL-SCNC: 19.6 MMOL/L (ref 22–29)
CREAT SERPL-MCNC: 0.95 MG/DL (ref 0.76–1.27)
EGFRCR SERPLBLD CKD-EPI 2021: 91.6 ML/MIN/1.73
EXPIRATION DATE: ABNORMAL
EXPIRATION DATE: NORMAL
GLOBULIN UR ELPH-MCNC: 2.9 GM/DL
GLUCOSE BLDC GLUCOMTR-MCNC: 221 MG/DL (ref 70–130)
GLUCOSE SERPL-MCNC: 207 MG/DL (ref 65–99)
HBA1C MFR BLD: 6.8 %
HDLC SERPL-MCNC: 49 MG/DL (ref 40–60)
LDLC SERPL CALC-MCNC: 94 MG/DL (ref 0–100)
LDLC/HDLC SERPL: 1.92 {RATIO}
Lab: ABNORMAL
Lab: NORMAL
POTASSIUM SERPL-SCNC: 4.1 MMOL/L (ref 3.5–5.2)
PROT SERPL-MCNC: 7.1 G/DL (ref 6–8.5)
PSA SERPL-MCNC: 1.07 NG/ML (ref 0–4)
SODIUM SERPL-SCNC: 136 MMOL/L (ref 136–145)
T4 FREE SERPL-MCNC: 1.16 NG/DL (ref 0.93–1.7)
TRIGL SERPL-MCNC: 69 MG/DL (ref 0–150)
TSH SERPL DL<=0.05 MIU/L-ACNC: 0.63 UIU/ML (ref 0.27–4.2)
VLDLC SERPL-MCNC: 14 MG/DL (ref 5–40)

## 2022-10-18 PROCEDURE — 82306 VITAMIN D 25 HYDROXY: CPT | Performed by: INTERNAL MEDICINE

## 2022-10-18 PROCEDURE — 80061 LIPID PANEL: CPT | Performed by: INTERNAL MEDICINE

## 2022-10-18 PROCEDURE — G0103 PSA SCREENING: HCPCS | Performed by: INTERNAL MEDICINE

## 2022-10-18 PROCEDURE — 84439 ASSAY OF FREE THYROXINE: CPT | Performed by: INTERNAL MEDICINE

## 2022-10-18 PROCEDURE — 99214 OFFICE O/P EST MOD 30 MIN: CPT | Performed by: INTERNAL MEDICINE

## 2022-10-18 PROCEDURE — 82947 ASSAY GLUCOSE BLOOD QUANT: CPT | Performed by: INTERNAL MEDICINE

## 2022-10-18 PROCEDURE — 84443 ASSAY THYROID STIM HORMONE: CPT | Performed by: INTERNAL MEDICINE

## 2022-10-18 PROCEDURE — 80053 COMPREHEN METABOLIC PANEL: CPT | Performed by: INTERNAL MEDICINE

## 2022-10-18 PROCEDURE — 83036 HEMOGLOBIN GLYCOSYLATED A1C: CPT | Performed by: INTERNAL MEDICINE

## 2022-10-18 RX ORDER — TIRZEPATIDE 2.5 MG/.5ML
2.5 INJECTION, SOLUTION SUBCUTANEOUS WEEKLY
Qty: 2 ML | Refills: 4 | Status: SHIPPED | OUTPATIENT
Start: 2022-10-18 | End: 2023-03-31

## 2022-10-18 RX ORDER — FLUTICASONE PROPIONATE 50 MCG
2 SPRAY, SUSPENSION (ML) NASAL DAILY
Qty: 48 ML | Refills: 1 | Status: SHIPPED | OUTPATIENT
Start: 2022-10-18

## 2022-10-18 NOTE — ASSESSMENT & PLAN NOTE
Blood sugar average c/w controlled type 2 diabetes  Add mounjaro to help with diabetes and weight loss

## 2022-10-18 NOTE — PROGRESS NOTES
Buzz Elmore 62 y.o.  CC:Follow-up, Hypertension, Hyperlipidemia, Vitamin D Deficiency, and Hyperglycemia      [place]: Follow-up, Hypertension, Hyperlipidemia, Vitamin D Deficiency, and Hyperglycemia    bp is good   Is eating low fat diet and taking crestor 40 mg daily   Is on vitamin D supplement  Blood sugar and 90 day average sugar reviewed  Results for orders placed or performed in visit on 10/18/22   POC Glycosylated Hemoglobin (Hb A1C)    Specimen: Blood   Result Value Ref Range    Hemoglobin A1C 6.8 %    Lot Number [id number]     Expiration Date 06/14/2024    POC Glucose, Blood    Specimen: Blood   Result Value Ref Range    Glucose 221 (A) 70 - 130 mg/dL    Lot Number [id number]     Expiration Date 03/29/2023      Average sugar 140  Planning longterm   Average sugar is 140   Discussed adding medication for blood sugar, weight loss    Allergies   Allergen Reactions   • Atorvastatin Myalgia       Current Outpatient Medications:   •  fluticasone (FLONASE) 50 MCG/ACT nasal spray, 2 sprays by Each Nare route Daily., Disp: 48 mL, Rfl: 1  •  aspirin 81 MG tablet, Take 81 mg by mouth Daily., Disp: , Rfl:   •  carvedilol (COREG) 12.5 MG tablet, Take 1 tablet by mouth 2 (Two) Times a Day With Meals., Disp: 60 tablet, Rfl: 5  •  Cholecalciferol (VITAMIN D) 2000 UNITS capsule, Take 2,000 Units by mouth Daily., Disp: , Rfl:   •  meloxicam (MOBIC) 15 MG tablet, Take 1 tablet by mouth Daily., Disp: 30 tablet, Rfl: 2  •  pantoprazole (PROTONIX) 40 MG EC tablet, TAKE ONE TABLET BY MOUTH DAILY, Disp: 90 tablet, Rfl: 0  •  prasugrel (EFFIENT) 10 MG tablet, Take 1 tablet by mouth Daily., Disp: 90 tablet, Rfl: 3  •  rosuvastatin (CRESTOR) 40 MG tablet, TAKE ONE TABLET BY MOUTH DAILY, Disp: 90 tablet, Rfl: 0  •  tamsulosin (FLOMAX) 0.4 MG capsule 24 hr capsule, Take 1 capsule by mouth Every Night., Disp: 30 capsule, Rfl: 0  •  telmisartan-hydrochlorothiazide (MICARDIS HCT) 80-25 MG per tablet, Take 1 tablet by mouth Daily.,  Disp: 90 tablet, Rfl: 3  •  Tirzepatide (Mounjaro) 2.5 MG/0.5ML solution pen-injector, Inject 2.5 mg under the skin into the appropriate area as directed 1 (One) Time Per Week., Disp: 2 mL, Rfl: 4  No current facility-administered medications for this visit.  Patient Active Problem List    Diagnosis    • Colon polyps [K63.5]    • Abdominal wall cellulitis [L03.311]    • Left upper quadrant abdominal mass [R19.02]    • IHD (ischemic heart disease) [I25.9]    • Obesity [E66.9]    • GERD (gastroesophageal reflux disease) [K21.9]    • Chronic low back pain [M54.50, G89.29]    • Coagulation disorder (HCC) [D68.9]    • Benign prostatic hyperplasia with urinary obstruction [N40.1, N13.8]    • Chest pain [R07.9]    • Mechanical complication due to tissue graft [T85.698A]    • Atherosclerosis of coronary artery [I25.10]    • Hyperglycemia [R73.9]    • Gastroesophageal reflux disease [K21.9]    • Hyperlipidemia [E78.5]    • Hypertension [I10]    • Knee pain [M25.569]    • Lipoma [D17.9]    • Low back pain [M54.50]    • Calculus of kidney [N20.0]    • Muscle pain [M79.10]    • Vitamin D deficiency [E55.9]    • Acute serous otitis media [H65.00]    • 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   HENT: Negative for congestion and rhinorrhea.    Eyes: Negative for visual disturbance.   Respiratory: Negative for cough and shortness of breath.    Cardiovascular: Negative for palpitations and leg swelling.   Gastrointestinal: Negative for constipation, diarrhea and nausea.   Genitourinary: Negative for hematuria.   Musculoskeletal: Negative for arthralgias, back pain, gait problem, joint swelling and myalgias.   Skin: Negative for color change, rash and wound.   Allergic/Immunologic: Negative for environmental  "allergies, food allergies and immunocompromised state.   Neurological: Negative for dizziness, weakness and light-headedness.   Psychiatric/Behavioral: Negative for confusion, decreased concentration, dysphoric mood and sleep disturbance. The patient is not nervous/anxious.      Social History     Socioeconomic History   • Marital status:    Tobacco Use   • Smoking status: Never   • Smokeless tobacco: Never   Vaping Use   • Vaping Use: Never used   Substance and Sexual Activity   • Alcohol use: Yes     Comment: occasional   • Drug use: No   • Sexual activity: Yes     Partners: Female     Family History   Problem Relation Age of Onset   • Breast cancer Other    • Colon cancer Other    • Heart disease Other    • Breast cancer Mother    • Heart disease Mother    • Hypertension Mother    • Heart attack Mother    • Colon cancer Father    • Hypertension Father    • Heart attack Father    • Diabetes Father    • Heart disease Paternal Grandfather    • Heart disease Brother      /80   Pulse 85   Ht 185.4 cm (73\")   Wt 122 kg (270 lb)   SpO2 98%   BMI 35.62 kg/m²   Physical Exam  Vitals and nursing note reviewed.   Constitutional:       Appearance: Normal appearance. He is well-developed.   HENT:      Head: Normocephalic and atraumatic.   Eyes:      General: Lids are normal.      Extraocular Movements: Extraocular movements intact.      Conjunctiva/sclera: Conjunctivae normal.      Pupils: Pupils are equal, round, and reactive to light.   Neck:      Thyroid: No thyroid mass or thyromegaly.      Vascular: No carotid bruit.      Trachea: Trachea normal. No tracheal deviation.   Cardiovascular:      Rate and Rhythm: Normal rate and regular rhythm.      Pulses: Normal pulses.      Heart sounds: Normal heart sounds. No murmur heard.    No friction rub. No gallop.   Pulmonary:      Effort: Pulmonary effort is normal. No respiratory distress.      Breath sounds: Normal breath sounds. No wheezing.   Musculoskeletal: "         General: No deformity. Normal range of motion.      Cervical back: Normal range of motion and neck supple.   Lymphadenopathy:      Cervical: No cervical adenopathy.   Skin:     General: Skin is warm and dry.      Findings: No erythema or rash.      Nails: There is no clubbing.   Neurological:      General: No focal deficit present.      Mental Status: He is alert and oriented to person, place, and time.      Cranial Nerves: No cranial nerve deficit.      Deep Tendon Reflexes: Reflexes are normal and symmetric. Reflexes normal.   Psychiatric:         Mood and Affect: Mood normal.         Speech: Speech normal.         Behavior: Behavior normal.         Thought Content: Thought content normal.         Judgment: Judgment normal.       Results for orders placed or performed in visit on 10/18/22   POC Glycosylated Hemoglobin (Hb A1C)    Specimen: Blood   Result Value Ref Range    Hemoglobin A1C 6.8 %    Lot Number [id number]     Expiration Date 06/14/2024    POC Glucose, Blood    Specimen: Blood   Result Value Ref Range    Glucose 221 (A) 70 - 130 mg/dL    Lot Number [id number]     Expiration Date 03/29/2023      Diagnoses and all orders for this visit:    1. Hyperglycemia (Primary)  Assessment & Plan:  Blood sugar average c/w controlled type 2 diabetes  Add mounjaro to help with diabetes and weight loss    Orders:  -     POC Glycosylated Hemoglobin (Hb A1C)  -     POC Glucose, Blood  -     Comprehensive Metabolic Panel    2. Primary hypertension  Assessment & Plan:  bp is good   Continue monitoring       3. Mixed hyperlipidemia  Assessment & Plan:  Is on low fat diet, crestor 40 mg daily   Check flp     Orders:  -     Lipid Panel  -     TSH  -     T4, Free    4. Vitamin D deficiency  Assessment & Plan:  Update levels     Orders:  -     Vitamin D 25 Hydroxy    5. Screening for prostate cancer  -     PSA Screen    Other orders  -     fluticasone (FLONASE) 50 MCG/ACT nasal spray; 2 sprays by Each Nare route Daily.   Dispense: 48 mL; Refill: 1  -     Tirzepatide (Mounjaro) 2.5 MG/0.5ML solution pen-injector; Inject 2.5 mg under the skin into the appropriate area as directed 1 (One) Time Per Week.  Dispense: 2 mL; Refill: 4  Return in about 3 months (around 1/18/2023) for Recheck.    Maida Rey MD  Signed Maida Rey MD

## 2022-10-31 RX ORDER — CARVEDILOL 12.5 MG/1
TABLET ORAL
Qty: 60 TABLET | Refills: 11 | Status: SHIPPED | OUTPATIENT
Start: 2022-10-31

## 2022-11-14 RX ORDER — PANTOPRAZOLE SODIUM 40 MG/1
TABLET, DELAYED RELEASE ORAL
Qty: 90 TABLET | Refills: 1 | Status: SHIPPED | OUTPATIENT
Start: 2022-11-14

## 2022-11-28 RX ORDER — ROSUVASTATIN CALCIUM 40 MG/1
TABLET, COATED ORAL
Qty: 90 TABLET | Refills: 3 | Status: SHIPPED | OUTPATIENT
Start: 2022-11-28

## 2022-12-07 ENCOUNTER — TELEPHONE (OUTPATIENT)
Dept: ORTHOPEDIC SURGERY | Facility: CLINIC | Age: 62
End: 2022-12-07

## 2022-12-07 RX ORDER — MELOXICAM 15 MG/1
15 TABLET ORAL DAILY
Qty: 30 TABLET | Refills: 2 | Status: ON HOLD | OUTPATIENT
Start: 2022-12-07 | End: 2023-02-14 | Stop reason: SDUPTHER

## 2022-12-13 ENCOUNTER — TELEPHONE (OUTPATIENT)
Dept: ENDOCRINOLOGY | Facility: CLINIC | Age: 62
End: 2022-12-13

## 2022-12-13 NOTE — TELEPHONE ENCOUNTER
CALL BACK [phone] [place] (SPOUSE)     [facility] [place] CALLED THIS MORNING TO FOLLOW UP ON CDL PAPERWORK TO MAKE SURE THAT IT HAD BEEN FILLED OUT AND FAXED BACK.

## 2022-12-13 NOTE — TELEPHONE ENCOUNTER
Gabrielle was advised Dr Rey completed the Endo part of the CDL form but cannot complete the cardiology portion and it was faxed back to them stating that  She voiced understanding

## 2022-12-19 ENCOUNTER — TELEPHONE (OUTPATIENT)
Dept: GASTROENTEROLOGY | Facility: CLINIC | Age: 62
End: 2022-12-19

## 2022-12-19 NOTE — TELEPHONE ENCOUNTER
Buzz Elmore  [address] [place] [address]  1960        Patient will be having a a colonoscopy by Dr. Matthias Austin on January 9, 2023. The patient is needing cardiac clearance due to being on blood thinners. Please complete the following and fax back to the office.     Patient's was last seen in the office on:_____________________    Procedure/Test Performed:    _____ Stress Test       _____ Echocardiogram    _____ EKG     _____ Heart Cath    Based on the above test results and/or clinical evaluation it is my recommendation:    ____ Patient may proceed with the scheduled surgical procedure with an acceptable cardiac risk.    ____ Patient CAN NOT stop Plavix, Effient, Ticlid, Aspirin, Coumadin, Pradaxa, Xarelto, Eliquis, Savaysa, or Brilinta prior to procedure.     ____ Patient CAN stop Plavix, Effient, Ticlid, Aspirin, Coumadin, Pradaxa, Xarelto, Eliquis, Savaysa, or Brilinta  ______ days prior to procedure.    Please sign and date below.    Signature________________________________________   Date:_________________     Thank You    Mark I. Brunner, M.D.  ANDREW Lam M.D. Mark A. Spurlin, M.D. Gregory M. Woolfolk, M.D. Laura Travis, TYSON Locke, CARLOS Bailey     Alert-The patient is alert, awake and responds to voice. The patient is oriented to time, place, and person. The triage nurse is able to obtain subjective information.

## 2022-12-20 DIAGNOSIS — Z12.11 SCREENING FOR COLON CANCER: Primary | ICD-10-CM

## 2023-01-06 ENCOUNTER — TELEPHONE (OUTPATIENT)
Dept: GASTROENTEROLOGY | Facility: CLINIC | Age: 63
End: 2023-01-06
Payer: COMMERCIAL

## 2023-01-09 ENCOUNTER — OUTSIDE FACILITY SERVICE (OUTPATIENT)
Dept: GASTROENTEROLOGY | Facility: CLINIC | Age: 63
End: 2023-01-09
Payer: COMMERCIAL

## 2023-01-09 PROCEDURE — 45380 COLONOSCOPY AND BIOPSY: CPT | Performed by: INTERNAL MEDICINE

## 2023-01-09 PROCEDURE — 88305 TISSUE EXAM BY PATHOLOGIST: CPT

## 2023-01-09 PROCEDURE — 45385 COLONOSCOPY W/LESION REMOVAL: CPT | Performed by: INTERNAL MEDICINE

## 2023-01-10 ENCOUNTER — LAB REQUISITION (OUTPATIENT)
Dept: LAB | Facility: HOSPITAL | Age: 63
End: 2023-01-10
Payer: COMMERCIAL

## 2023-01-10 DIAGNOSIS — D12.8 BENIGN NEOPLASM OF RECTUM: ICD-10-CM

## 2023-01-10 DIAGNOSIS — Z86.010 PERSONAL HISTORY OF COLONIC POLYPS: ICD-10-CM

## 2023-01-10 DIAGNOSIS — Z12.11 ENCOUNTER FOR SCREENING FOR MALIGNANT NEOPLASM OF COLON: ICD-10-CM

## 2023-01-10 DIAGNOSIS — K57.30 DIVERTICULOSIS OF LARGE INTESTINE WITHOUT PERFORATION OR ABSCESS WITHOUT BLEEDING: ICD-10-CM

## 2023-01-10 DIAGNOSIS — D12.0 BENIGN NEOPLASM OF CECUM: ICD-10-CM

## 2023-01-10 DIAGNOSIS — K64.8 OTHER HEMORRHOIDS: ICD-10-CM

## 2023-01-12 LAB — REF LAB TEST METHOD: NORMAL

## 2023-01-19 ENCOUNTER — OFFICE VISIT (OUTPATIENT)
Dept: ENDOCRINOLOGY | Facility: CLINIC | Age: 63
End: 2023-01-19
Payer: COMMERCIAL

## 2023-01-19 VITALS
HEIGHT: 72 IN | WEIGHT: 256.2 LBS | BODY MASS INDEX: 34.7 KG/M2 | DIASTOLIC BLOOD PRESSURE: 60 MMHG | OXYGEN SATURATION: 98 % | HEART RATE: 74 BPM | SYSTOLIC BLOOD PRESSURE: 128 MMHG

## 2023-01-19 DIAGNOSIS — I10 PRIMARY HYPERTENSION: ICD-10-CM

## 2023-01-19 DIAGNOSIS — E11.9 TYPE 2 DIABETES MELLITUS WITHOUT COMPLICATION, WITHOUT LONG-TERM CURRENT USE OF INSULIN: ICD-10-CM

## 2023-01-19 DIAGNOSIS — E66.09 CLASS 1 OBESITY DUE TO EXCESS CALORIES WITH SERIOUS COMORBIDITY AND BODY MASS INDEX (BMI) OF 34.0 TO 34.9 IN ADULT: Primary | ICD-10-CM

## 2023-01-19 DIAGNOSIS — E78.2 MIXED HYPERLIPIDEMIA: ICD-10-CM

## 2023-01-19 LAB
EXPIRATION DATE: NORMAL
EXPIRATION DATE: NORMAL
GLUCOSE BLDC GLUCOMTR-MCNC: 109 MG/DL (ref 70–130)
HBA1C MFR BLD: 6 %
Lab: NORMAL
Lab: NORMAL

## 2023-01-19 PROCEDURE — 83036 HEMOGLOBIN GLYCOSYLATED A1C: CPT | Performed by: INTERNAL MEDICINE

## 2023-01-19 PROCEDURE — 82947 ASSAY GLUCOSE BLOOD QUANT: CPT | Performed by: INTERNAL MEDICINE

## 2023-01-19 PROCEDURE — 99214 OFFICE O/P EST MOD 30 MIN: CPT | Performed by: INTERNAL MEDICINE

## 2023-01-19 NOTE — ASSESSMENT & PLAN NOTE
Blood sugar and 90 day average sugar reviewed  Results for orders placed or performed in visit on 01/19/23   POC Glycosylated Hemoglobin (Hb A1C)    Specimen: Blood   Result Value Ref Range    Hemoglobin A1C 6.0 %    Lot Number [id number]     Expiration Date 11/3/2024    POC Glucose, Blood    Specimen: Blood   Result Value Ref Range    Glucose 109 70 - 130 mg/dL    Lot Number [id number]     Expiration Date 07/22/2023      C/w well controlled diabetes- continue diet, exercise and mounjaro   Working on weight loss

## 2023-01-19 NOTE — PROGRESS NOTES
Buzz Elmore 62 y.o.  CC:Follow-up, Hyperglycemia, Hypertension, Hyperlipidemia, and Vitamin D Deficiency    [place]: Follow-up, Hyperglycemia, Hypertension, Hyperlipidemia, and Vitamin D Deficiency    Blood sugar and 90 day average sugar reviewed  Results for orders placed or performed in visit on 01/19/23   POC Glycosylated Hemoglobin (Hb A1C)    Specimen: Blood   Result Value Ref Range    Hemoglobin A1C 6.0 %    Lot Number [id number]     Expiration Date 11/3/2024    POC Glucose, Blood    Specimen: Blood   Result Value Ref Range    Glucose 109 70 - 130 mg/dL    Lot Number [id number]     Expiration Date 07/22/2023      Average sugar is 120   bp is good  Is taking crestor 40 mg daily and following low fat diet  Is on vitamin D supplement as well     Allergies   Allergen Reactions   • Atorvastatin Myalgia       Current Outpatient Medications:   •  aspirin 81 MG tablet, Take 81 mg by mouth Daily., Disp: , Rfl:   •  carvedilol (COREG) 12.5 MG tablet, TAKE ONE TABLET BY MOUTH TWICE A DAY WITH MEALS, Disp: 60 tablet, Rfl: 11  •  Cholecalciferol (VITAMIN D) 2000 UNITS capsule, Take 2,000 Units by mouth Daily., Disp: , Rfl:   •  fluticasone (FLONASE) 50 MCG/ACT nasal spray, 2 sprays by Each Nare route Daily., Disp: 48 mL, Rfl: 1  •  meloxicam (MOBIC) 15 MG tablet, Take 1 tablet by mouth Daily., Disp: 30 tablet, Rfl: 2  •  pantoprazole (PROTONIX) 40 MG EC tablet, TAKE ONE TABLET BY MOUTH DAILY, Disp: 90 tablet, Rfl: 1  •  prasugrel (EFFIENT) 10 MG tablet, Take 1 tablet by mouth Daily., Disp: 90 tablet, Rfl: 3  •  rosuvastatin (CRESTOR) 40 MG tablet, TAKE ONE TABLET BY MOUTH DAILY, Disp: 90 tablet, Rfl: 3  •  tamsulosin (FLOMAX) 0.4 MG capsule 24 hr capsule, Take 1 capsule by mouth Every Night., Disp: 30 capsule, Rfl: 0  •  telmisartan-hydrochlorothiazide (MICARDIS HCT) 80-25 MG per tablet, Take 1 tablet by mouth Daily., Disp: 90 tablet, Rfl: 3  •  Tirzepatide (Mounjaro) 2.5 MG/0.5ML solution pen-injector, Inject 2.5 mg  under the skin into the appropriate area as directed 1 (One) Time Per Week., Disp: 2 mL, Rfl: 4  Patient Active Problem List    Diagnosis    • Colon polyps [K63.5]    • Abdominal wall cellulitis [L03.311]    • Left upper quadrant abdominal mass [R19.02]    • IHD (ischemic heart disease) [I25.9]    • Obesity [E66.9]    • GERD (gastroesophageal reflux disease) [K21.9]    • Chronic low back pain [M54.50, G89.29]    • Coagulation disorder (HCC) [D68.9]    • Benign prostatic hyperplasia with urinary obstruction [N40.1, N13.8]    • Chest pain [R07.9]    • Mechanical complication due to tissue graft [T85.698A]    • Atherosclerosis of coronary artery [I25.10]    • Type 2 diabetes mellitus without complication, without long-term current use of insulin (HCC) [E11.9]    • Gastroesophageal reflux disease [K21.9]    • Hyperlipidemia [E78.5]    • Hypertension [I10]    • Knee pain [M25.569]    • Lipoma [D17.9]    • Low back pain [M54.50]    • Calculus of kidney [N20.0]    • Muscle pain [M79.10]    • Vitamin D deficiency [E55.9]    • Acute serous otitis media [H65.00]    • 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   HENT: Negative for congestion and rhinorrhea.    Eyes: Negative for visual disturbance.   Respiratory: Negative for cough and shortness of breath.    Cardiovascular: Negative for palpitations and leg swelling.   Gastrointestinal: Negative for constipation, diarrhea and nausea.   Genitourinary: Negative for hematuria.   Musculoskeletal: Positive for arthralgias (right knee pain ). Negative for back pain, gait problem, joint swelling and myalgias.   Skin: Negative for color change, rash and wound.   Allergic/Immunologic: Negative for environmental allergies, food allergies and immunocompromised state.  "  Neurological: Negative for dizziness, weakness and light-headedness.   Psychiatric/Behavioral: Negative for confusion, decreased concentration, dysphoric mood and sleep disturbance. The patient is not nervous/anxious.      Social History     Socioeconomic History   • Marital status:    Tobacco Use   • Smoking status: Never   • Smokeless tobacco: Never   Vaping Use   • Vaping Use: Never used   Substance and Sexual Activity   • Alcohol use: Yes     Comment: occasional   • Drug use: No   • Sexual activity: Yes     Partners: Female     Family History   Problem Relation Age of Onset   • Breast cancer Other    • Colon cancer Other    • Heart disease Other    • Breast cancer Mother    • Heart disease Mother    • Hypertension Mother    • Heart attack Mother    • Cancer Mother    • Colon cancer Father    • Hypertension Father    • Heart attack Father    • Diabetes Father    • Heart disease Paternal Grandfather    • Heart disease Brother      /60   Pulse 74   Ht 182.9 cm (72\")   Wt 116 kg (256 lb 3.2 oz)   SpO2 98%   BMI 34.75 kg/m²   Physical Exam  Vitals and nursing note reviewed.   Constitutional:       Appearance: Normal appearance. He is well-developed.   HENT:      Head: Normocephalic and atraumatic.   Eyes:      General: Lids are normal.      Extraocular Movements: Extraocular movements intact.      Conjunctiva/sclera: Conjunctivae normal.      Pupils: Pupils are equal, round, and reactive to light.   Neck:      Thyroid: No thyroid mass or thyromegaly.      Vascular: No carotid bruit.      Trachea: Trachea normal. No tracheal deviation.   Cardiovascular:      Rate and Rhythm: Normal rate and regular rhythm.      Pulses: Normal pulses.      Heart sounds: Normal heart sounds. No murmur heard.    No friction rub. No gallop.   Pulmonary:      Effort: Pulmonary effort is normal. No respiratory distress.      Breath sounds: Normal breath sounds. No wheezing.   Musculoskeletal:         General: No " deformity. Normal range of motion.      Cervical back: Normal range of motion and neck supple.   Lymphadenopathy:      Cervical: No cervical adenopathy.   Skin:     General: Skin is warm and dry.      Findings: No erythema or rash.      Nails: There is no clubbing.   Neurological:      General: No focal deficit present.      Mental Status: He is alert and oriented to person, place, and time.      Cranial Nerves: No cranial nerve deficit.      Deep Tendon Reflexes: Reflexes are normal and symmetric. Reflexes normal.   Psychiatric:         Mood and Affect: Mood normal.         Speech: Speech normal.         Behavior: Behavior normal.         Thought Content: Thought content normal.         Judgment: Judgment normal.       Results for orders placed or performed in visit on 01/19/23   POC Glycosylated Hemoglobin (Hb A1C)    Specimen: Blood   Result Value Ref Range    Hemoglobin A1C 6.0 %    Lot Number [id number]     Expiration Date 11/3/2024    POC Glucose, Blood    Specimen: Blood   Result Value Ref Range    Glucose 109 70 - 130 mg/dL    Lot Number [id number]     Expiration Date 07/22/2023      Diagnoses and all orders for this visit:    1. Class 1 obesity due to excess calories with serious comorbidity and body mass index (BMI) of 34.0 to 34.9 in adult (Primary)  Assessment & Plan:  Blood sugar and 90 day average sugar reviewed  Results for orders placed or performed in visit on 01/19/23   POC Glycosylated Hemoglobin (Hb A1C)    Specimen: Blood   Result Value Ref Range    Hemoglobin A1C 6.0 %    Lot Number [id number]     Expiration Date 11/3/2024    POC Glucose, Blood    Specimen: Blood   Result Value Ref Range    Glucose 109 70 - 130 mg/dL    Lot Number [id number]     Expiration Date 07/22/2023      C/w well controlled diabetes- continue diet, exercise and mounjaro   Working on weight loss     Orders:  -     POC Glycosylated Hemoglobin (Hb A1C)  -     POC Glucose, Blood    2. Mixed hyperlipidemia  Assessment &  Plan:  Is eating low fat diet, taking crestor 40 mg daily   LDL 94 on 10/22      3. Primary hypertension  Assessment & Plan:  bp currently controlled  Continue monitoring and medication       4. Type 2 diabetes mellitus without complication, without long-term current use of insulin ([facility])  Assessment & Plan:  Blood sugar and 90 day average sugar reviewed  Results for orders placed or performed in visit on 01/19/23   POC Glycosylated Hemoglobin (Hb A1C)    Specimen: Blood   Result Value Ref Range    Hemoglobin A1C 6.0 %    Lot Number [id number]     Expiration Date 11/3/2024    POC Glucose, Blood    Specimen: Blood   Result Value Ref Range    Glucose 109 70 - 130 mg/dL    Lot Number [id number]     Expiration Date 07/22/2023      C/w well controlled diabetes- continue diet, exercise and mounjaro   Working on weight loss     Return in about 3 months (around 4/19/2023) for Recheck.    Maida Rey MD  Signed Maida Rey MD

## 2023-01-25 ENCOUNTER — OFFICE VISIT (OUTPATIENT)
Dept: ORTHOPEDIC SURGERY | Facility: CLINIC | Age: 63
End: 2023-01-25
Payer: COMMERCIAL

## 2023-01-25 VITALS
DIASTOLIC BLOOD PRESSURE: 82 MMHG | BODY MASS INDEX: 34.64 KG/M2 | HEIGHT: 72 IN | SYSTOLIC BLOOD PRESSURE: 121 MMHG | WEIGHT: 255.73 LBS

## 2023-01-25 DIAGNOSIS — M17.0 PRIMARY OSTEOARTHRITIS OF BOTH KNEES: Primary | ICD-10-CM

## 2023-01-25 PROCEDURE — 99214 OFFICE O/P EST MOD 30 MIN: CPT | Performed by: ORTHOPAEDIC SURGERY

## 2023-01-31 ENCOUNTER — TELEPHONE (OUTPATIENT)
Dept: CARDIOLOGY | Facility: CLINIC | Age: 63
End: 2023-01-31
Payer: COMMERCIAL

## 2023-01-31 ENCOUNTER — PREP FOR SURGERY (OUTPATIENT)
Dept: OTHER | Facility: HOSPITAL | Age: 63
End: 2023-01-31
Payer: COMMERCIAL

## 2023-01-31 DIAGNOSIS — M17.11 PRIMARY OSTEOARTHRITIS OF RIGHT KNEE: Primary | ICD-10-CM

## 2023-01-31 RX ORDER — PREGABALIN 150 MG/1
150 CAPSULE ORAL ONCE
Status: CANCELLED | OUTPATIENT
Start: 2023-01-31 | End: 2023-01-31

## 2023-01-31 RX ORDER — TRANEXAMIC ACID 10 MG/ML
1000 INJECTION, SOLUTION INTRAVENOUS ONCE
Status: CANCELLED | OUTPATIENT
Start: 2023-01-31 | End: 2023-01-31

## 2023-01-31 RX ORDER — CEFAZOLIN SODIUM 2 G/100ML
2 INJECTION, SOLUTION INTRAVENOUS ONCE
Status: CANCELLED | OUTPATIENT
Start: 2023-01-31 | End: 2023-01-31

## 2023-01-31 RX ORDER — ACETAMINOPHEN 500 MG
1000 TABLET ORAL ONCE
Status: CANCELLED | OUTPATIENT
Start: 2023-01-31 | End: 2023-01-31

## 2023-01-31 RX ORDER — MELOXICAM 15 MG/1
15 TABLET ORAL ONCE
Status: CANCELLED | OUTPATIENT
Start: 2023-01-31 | End: 2023-01-31

## 2023-01-31 NOTE — TELEPHONE ENCOUNTER
If he has no change in symptoms however he can proceed with knee surgery at low cardiac risk.  May hold plavix 5-7 days

## 2023-02-07 ENCOUNTER — TELEPHONE (OUTPATIENT)
Dept: ORTHOPEDIC SURGERY | Facility: CLINIC | Age: 63
End: 2023-02-07

## 2023-02-07 ENCOUNTER — PRE-ADMISSION TESTING (OUTPATIENT)
Dept: PREADMISSION TESTING | Facility: HOSPITAL | Age: 63
End: 2023-02-07
Payer: COMMERCIAL

## 2023-02-07 VITALS — WEIGHT: 260.47 LBS | HEIGHT: 72 IN | BODY MASS INDEX: 35.28 KG/M2

## 2023-02-07 DIAGNOSIS — M17.11 PRIMARY OSTEOARTHRITIS OF RIGHT KNEE: ICD-10-CM

## 2023-02-07 DIAGNOSIS — M17.0 PRIMARY OSTEOARTHRITIS OF BOTH KNEES: Primary | ICD-10-CM

## 2023-02-07 LAB
ANION GAP SERPL CALCULATED.3IONS-SCNC: 12 MMOL/L (ref 5–15)
APTT PPP: 28.5 SECONDS (ref 22–39)
BASOPHILS # BLD AUTO: 0.03 10*3/MM3 (ref 0–0.2)
BASOPHILS NFR BLD AUTO: 0.3 % (ref 0–1.5)
BUN SERPL-MCNC: 18 MG/DL (ref 8–23)
BUN/CREAT SERPL: 20 (ref 7–25)
CALCIUM SPEC-SCNC: 9.8 MG/DL (ref 8.6–10.5)
CHLORIDE SERPL-SCNC: 105 MMOL/L (ref 98–107)
CO2 SERPL-SCNC: 24 MMOL/L (ref 22–29)
CREAT SERPL-MCNC: 0.9 MG/DL (ref 0.76–1.27)
CRP SERPL-MCNC: <0.3 MG/DL (ref 0–0.5)
DEPRECATED RDW RBC AUTO: 43 FL (ref 37–54)
EGFRCR SERPLBLD CKD-EPI 2021: 97.8 ML/MIN/1.73
EOSINOPHIL # BLD AUTO: 0.16 10*3/MM3 (ref 0–0.4)
EOSINOPHIL NFR BLD AUTO: 1.6 % (ref 0.3–6.2)
ERYTHROCYTE [DISTWIDTH] IN BLOOD BY AUTOMATED COUNT: 13.3 % (ref 12.3–15.4)
ERYTHROCYTE [SEDIMENTATION RATE] IN BLOOD: 10 MM/HR (ref 0–20)
GLUCOSE SERPL-MCNC: 77 MG/DL (ref 65–99)
HBA1C MFR BLD: 6 % (ref 4.8–5.6)
HCT VFR BLD AUTO: 46.2 % (ref 37.5–51)
HGB BLD-MCNC: 15.3 G/DL (ref 13–17.7)
IMM GRANULOCYTES # BLD AUTO: 0.02 10*3/MM3 (ref 0–0.05)
IMM GRANULOCYTES NFR BLD AUTO: 0.2 % (ref 0–0.5)
INR PPP: 0.97 (ref 0.84–1.13)
LYMPHOCYTES # BLD AUTO: 4.37 10*3/MM3 (ref 0.7–3.1)
LYMPHOCYTES NFR BLD AUTO: 43.1 % (ref 19.6–45.3)
MCH RBC QN AUTO: 29.2 PG (ref 26.6–33)
MCHC RBC AUTO-ENTMCNC: 33.1 G/DL (ref 31.5–35.7)
MCV RBC AUTO: 88.2 FL (ref 79–97)
MONOCYTES # BLD AUTO: 0.61 10*3/MM3 (ref 0.1–0.9)
MONOCYTES NFR BLD AUTO: 6 % (ref 5–12)
NEUTROPHILS NFR BLD AUTO: 4.96 10*3/MM3 (ref 1.7–7)
NEUTROPHILS NFR BLD AUTO: 48.8 % (ref 42.7–76)
NRBC BLD AUTO-RTO: 0 /100 WBC (ref 0–0.2)
PLATELET # BLD AUTO: 185 10*3/MM3 (ref 140–450)
PMV BLD AUTO: 9.8 FL (ref 6–12)
POTASSIUM SERPL-SCNC: 4.1 MMOL/L (ref 3.5–5.2)
PROTHROMBIN TIME: 12.8 SECONDS (ref 11.4–14.4)
RBC # BLD AUTO: 5.24 10*6/MM3 (ref 4.14–5.8)
SODIUM SERPL-SCNC: 141 MMOL/L (ref 136–145)
WBC NRBC COR # BLD: 10.15 10*3/MM3 (ref 3.4–10.8)

## 2023-02-07 PROCEDURE — 85025 COMPLETE CBC W/AUTO DIFF WBC: CPT

## 2023-02-07 PROCEDURE — 86140 C-REACTIVE PROTEIN: CPT

## 2023-02-07 PROCEDURE — 93010 ELECTROCARDIOGRAM REPORT: CPT | Performed by: INTERNAL MEDICINE

## 2023-02-07 PROCEDURE — 36415 COLL VENOUS BLD VENIPUNCTURE: CPT

## 2023-02-07 PROCEDURE — 80048 BASIC METABOLIC PNL TOTAL CA: CPT

## 2023-02-07 PROCEDURE — 85652 RBC SED RATE AUTOMATED: CPT

## 2023-02-07 PROCEDURE — 85730 THROMBOPLASTIN TIME PARTIAL: CPT

## 2023-02-07 PROCEDURE — 83036 HEMOGLOBIN GLYCOSYLATED A1C: CPT

## 2023-02-07 PROCEDURE — 85610 PROTHROMBIN TIME: CPT

## 2023-02-07 PROCEDURE — 93005 ELECTROCARDIOGRAM TRACING: CPT

## 2023-02-07 NOTE — PAT
Patient to apply Chlorhexadine wipes  to surgical area (as instructed) the night before procedure and the AM of procedure. Wipes provided.    Patient instructed to drink 20 ounces of Gatorade and it needs to be completed 1 hour (for Main OR patients) or 2 hours (scheduled  section & [facility] patients) before given arrival time for procedure (NO RED Gatorade)    Patient verbalized understanding.    Per Anesthesia Request, patient instructed not to take their ACE/ARB medications on the AM of surgery.    Patient viewed general PAT education video as instructed in their preoperative information received from their surgeon.  Patient stated the general PAT education video was viewed in its entirety and survey completed.  Copies of PAT general education handouts (Incentive Spirometry, Meds to Beds Program, Patient Belongings, Pre-op skin preparation instructions, Blood Glucose testing, Visitor policy, Surgery FAQ, Code H) distributed to patient if not printed. Education related to the PAT pass and skin preparation for surgery (if applicable) completed in PAT as a reinforcement to PAT education video. Patient instructed to return PAT pass provided today as well as completed skin preparation sheet (if applicable) on the day of procedure.     Additionally if patient had not viewed video yet but intended to view it at home or in our waiting area, then referred them to the handout with QR code/link provided during PAT visit.  Instructed patient to complete survey after viewing the video in its entirety.  Encouraged patient/family to read PAT general education handouts thoroughly and notify PAT staff with any questions or concerns. Patient verbalized understanding of all information and priority content.    Discussed with patient options for receiving total joint replacement education and assessed patient's ability and preference. Joint Replacement Guide given to patient during PAT visit since not received a copy within  the last year. Encouraged patient/family to read guide thoroughly and notify PAT staff with any questions or concerns. Handout provided directing patient to links to watch online videos related to joint replacement surgery on the [facility] website. The handout gives detailed instructions for joining an online joint replacement class through Zoom or phone conference offered on Thursdays. Patient agreed to participate by watching videos online. Patient verbalized understanding of instructions and to complete the online learning tool survey. Encouraged to share information with family and/or . An overview of the joint replacement education was provided during the visit including general perioperative instructions that are routine for all surgical patients (PAT PASS, wipes, directions to pre-op, etc.).    [employer] (by [employer]) pain pump patient informational handout given to patient.  Instructed patient to watch [employer] Patient Education Video regarding Peripheral Nerve Catheter that will be in place for upcoming surgery unless contraindicated. The video can be accessed using QR code noted on handout.  Patient agreed to watch video.  Stressed to patient to call [employer] Nursing Hotline 24/7 if patient has any questions or concerns after discharge.       Cardiac clearance from Dr. Kitchen in [employer]

## 2023-02-08 LAB
QT INTERVAL: 360 MS
QTC INTERVAL: 418 MS

## 2023-02-14 ENCOUNTER — ANESTHESIA (OUTPATIENT)
Dept: PERIOP | Facility: HOSPITAL | Age: 63
End: 2023-02-14
Payer: COMMERCIAL

## 2023-02-14 ENCOUNTER — APPOINTMENT (OUTPATIENT)
Dept: GENERAL RADIOLOGY | Facility: HOSPITAL | Age: 63
End: 2023-02-14
Payer: COMMERCIAL

## 2023-02-14 ENCOUNTER — HOSPITAL ENCOUNTER (OUTPATIENT)
Facility: HOSPITAL | Age: 63
Discharge: HOME | End: 2023-02-14
Attending: ORTHOPAEDIC SURGERY | Admitting: ORTHOPAEDIC SURGERY
Payer: COMMERCIAL

## 2023-02-14 ENCOUNTER — ANESTHESIA EVENT CONVERTED (OUTPATIENT)
Dept: ANESTHESIOLOGY | Facility: HOSPITAL | Age: 63
End: 2023-02-14
Payer: COMMERCIAL

## 2023-02-14 ENCOUNTER — ANESTHESIA EVENT (OUTPATIENT)
Dept: PERIOP | Facility: HOSPITAL | Age: 63
End: 2023-02-14
Payer: COMMERCIAL

## 2023-02-14 VITALS
OXYGEN SATURATION: 95 % | DIASTOLIC BLOOD PRESSURE: 94 MMHG | RESPIRATION RATE: 16 BRPM | SYSTOLIC BLOOD PRESSURE: 147 MMHG | TEMPERATURE: 98.3 F | HEART RATE: 82 BPM

## 2023-02-14 DIAGNOSIS — M17.11 PRIMARY OSTEOARTHRITIS OF RIGHT KNEE: ICD-10-CM

## 2023-02-14 DIAGNOSIS — Z96.651 S/P TOTAL KNEE REPLACEMENT, RIGHT: Primary | ICD-10-CM

## 2023-02-14 LAB — GLUCOSE BLDC GLUCOMTR-MCNC: 95 MG/DL (ref 70–130)

## 2023-02-14 PROCEDURE — C1776 JOINT DEVICE (IMPLANTABLE): HCPCS | Performed by: ORTHOPAEDIC SURGERY

## 2023-02-14 PROCEDURE — 25010000002 FENTANYL CITRATE (PF) 100 MCG/2ML SOLUTION: Performed by: ANESTHESIOLOGY

## 2023-02-14 PROCEDURE — 97161 PT EVAL LOW COMPLEX 20 MIN: CPT

## 2023-02-14 PROCEDURE — 25010000002 ROPIVACAINE PER 1 MG: Performed by: ORTHOPAEDIC SURGERY

## 2023-02-14 PROCEDURE — 25010000002 PROPOFOL 10 MG/ML EMULSION: Performed by: ANESTHESIOLOGY

## 2023-02-14 PROCEDURE — 25010000002 CEFAZOLIN IN DEXTROSE 2-4 GM/100ML-% SOLUTION: Performed by: ORTHOPAEDIC SURGERY

## 2023-02-14 PROCEDURE — 63710000001 ONDANSETRON PER 8 MG: Performed by: ORTHOPAEDIC SURGERY

## 2023-02-14 PROCEDURE — 82962 GLUCOSE BLOOD TEST: CPT

## 2023-02-14 PROCEDURE — 20985 CPTR-ASST DIR MS PX: CPT | Performed by: ORTHOPAEDIC SURGERY

## 2023-02-14 PROCEDURE — 25010000002 DEXAMETHASONE PER 1 MG: Performed by: ANESTHESIOLOGY

## 2023-02-14 PROCEDURE — 27447 TOTAL KNEE ARTHROPLASTY: CPT | Performed by: ORTHOPAEDIC SURGERY

## 2023-02-14 PROCEDURE — 97110 THERAPEUTIC EXERCISES: CPT

## 2023-02-14 PROCEDURE — 25010000002 HYDROMORPHONE HCL PF 2 MG/ML SOLUTION: Performed by: ANESTHESIOLOGY

## 2023-02-14 PROCEDURE — 25010000002 ONDANSETRON PER 1 MG: Performed by: ANESTHESIOLOGY

## 2023-02-14 PROCEDURE — C1713 ANCHOR/SCREW BN/BN,TIS/BN: HCPCS | Performed by: ORTHOPAEDIC SURGERY

## 2023-02-14 PROCEDURE — 27447 TOTAL KNEE ARTHROPLASTY: CPT | Performed by: PHYSICIAN ASSISTANT

## 2023-02-14 PROCEDURE — 73560 X-RAY EXAM OF KNEE 1 OR 2: CPT

## 2023-02-14 DEVICE — GENESIS II RESURFACING PATELLAR 35MM
Type: IMPLANTABLE DEVICE | Site: KNEE | Status: FUNCTIONAL
Brand: GENESIS II

## 2023-02-14 DEVICE — PALACOS® R IS A FAST-CURING, RADIOPAQUE, POLY(METHYL METHACRYLATE)-BASED BONE CEMENT.PALACOS ® R CONTAINS THE X-RAY CONTRAST MEDIUM ZIRCONIUM DIOXIDE. TO IMPROVE VISIBILITY IN THE SURGICAL FIELD PALACOS ® R HAS BEEN COLOURED WITH CHLOROPHYLL (E141). THE BONE CEMENT IS PREPARED DIRECTLY BEFORE USE BY MIXING A POLYMER POWDER COMPONENT WITH A LIQUID MONOMER COMPONENT. A DUCTILE DOUGH FORMS WHICH CURES WITHIN A FEW MINUTES.
Type: IMPLANTABLE DEVICE | Site: KNEE | Status: FUNCTIONAL
Brand: PALACOS®

## 2023-02-14 DEVICE — VIOLET ANTIBACTERIAL POLYDIOXANONE, KNOTLESS TISSUE CONTROL DEVICE
Type: IMPLANTABLE DEVICE | Site: KNEE | Status: FUNCTIONAL
Brand: STRATAFIX

## 2023-02-14 DEVICE — GENESIS II NON-POROUS TIBIAL                                    BASEPLATE SIZE 6 RIGHT
Type: IMPLANTABLE DEVICE | Site: KNEE | Status: FUNCTIONAL
Brand: GENESIS II

## 2023-02-14 DEVICE — DEV CONTRL TISS STRATAFIX SPIRAL MNCRYL UD 3/0 PLS 60CM: Type: IMPLANTABLE DEVICE | Site: KNEE | Status: FUNCTIONAL

## 2023-02-14 DEVICE — LEGION CRUCIATE RETAINING HIGH                                    FLEX HIGHLY CROSS LINKED                                    POLYETHYLENE SIZE 5-6 11MM
Type: IMPLANTABLE DEVICE | Site: KNEE | Status: FUNCTIONAL
Brand: LEGION

## 2023-02-14 DEVICE — LEGION CRUCIATE RETAINING OXINIUM                                    FEMORAL SIZE 7 RIGHT
Type: IMPLANTABLE DEVICE | Site: KNEE | Status: FUNCTIONAL
Brand: LEGION

## 2023-02-14 DEVICE — IMPLANTABLE DEVICE: Type: IMPLANTABLE DEVICE | Site: KNEE | Status: FUNCTIONAL

## 2023-02-14 RX ORDER — FENTANYL CITRATE 50 UG/ML
INJECTION, SOLUTION INTRAMUSCULAR; INTRAVENOUS AS NEEDED
Status: DISCONTINUED | OUTPATIENT
Start: 2023-02-14 | End: 2023-02-14 | Stop reason: SURG

## 2023-02-14 RX ORDER — EPHEDRINE SULFATE 50 MG/ML
INJECTION, SOLUTION INTRAVENOUS AS NEEDED
Status: DISCONTINUED | OUTPATIENT
Start: 2023-02-14 | End: 2023-02-14 | Stop reason: SURG

## 2023-02-14 RX ORDER — PRASUGREL 10 MG/1
10 TABLET, FILM COATED ORAL DAILY
Qty: 90 TABLET | Refills: 3 | Status: SHIPPED | OUTPATIENT
Start: 2023-02-16

## 2023-02-14 RX ORDER — FENTANYL CITRATE 50 UG/ML
50 INJECTION, SOLUTION INTRAMUSCULAR; INTRAVENOUS
Status: DISCONTINUED | OUTPATIENT
Start: 2023-02-14 | End: 2023-02-14 | Stop reason: HOSPADM

## 2023-02-14 RX ORDER — MELOXICAM 15 MG/1
15 TABLET ORAL ONCE
Status: COMPLETED | OUTPATIENT
Start: 2023-02-14 | End: 2023-02-14

## 2023-02-14 RX ORDER — NALOXONE HCL 0.4 MG/ML
0.4 VIAL (ML) INJECTION AS NEEDED
Status: DISCONTINUED | OUTPATIENT
Start: 2023-02-14 | End: 2023-02-14 | Stop reason: HOSPADM

## 2023-02-14 RX ORDER — NALOXONE HCL 0.4 MG/ML
0.1 VIAL (ML) INJECTION
Status: DISCONTINUED | OUTPATIENT
Start: 2023-02-14 | End: 2023-02-14 | Stop reason: HOSPADM

## 2023-02-14 RX ORDER — MELOXICAM 15 MG/1
15 TABLET ORAL DAILY
Qty: 30 TABLET | Refills: 2
Start: 2023-02-15 | End: 2023-03-06

## 2023-02-14 RX ORDER — POLYETHYLENE GLYCOL 3350 17 G/17G
17 POWDER, FOR SOLUTION ORAL DAILY
Qty: 15 PACKET | Refills: 0 | Status: SHIPPED | OUTPATIENT
Start: 2023-02-14 | End: 2023-02-15 | Stop reason: SDUPTHER

## 2023-02-14 RX ORDER — MIDAZOLAM HYDROCHLORIDE 1 MG/ML
1 INJECTION INTRAMUSCULAR; INTRAVENOUS
Status: DISCONTINUED | OUTPATIENT
Start: 2023-02-14 | End: 2023-02-14 | Stop reason: HOSPADM

## 2023-02-14 RX ORDER — SODIUM CHLORIDE 9 MG/ML
40 INJECTION, SOLUTION INTRAVENOUS AS NEEDED
Status: CANCELLED | OUTPATIENT
Start: 2023-02-14

## 2023-02-14 RX ORDER — PREGABALIN 150 MG/1
150 CAPSULE ORAL ONCE
Status: COMPLETED | OUTPATIENT
Start: 2023-02-14 | End: 2023-02-14

## 2023-02-14 RX ORDER — NALOXONE HCL 0.4 MG/ML
0.4 VIAL (ML) INJECTION
Status: DISCONTINUED | OUTPATIENT
Start: 2023-02-14 | End: 2023-02-14 | Stop reason: HOSPADM

## 2023-02-14 RX ORDER — CEFAZOLIN SODIUM 2 G/100ML
2 INJECTION, SOLUTION INTRAVENOUS ONCE
Status: COMPLETED | OUTPATIENT
Start: 2023-02-14 | End: 2023-02-14

## 2023-02-14 RX ORDER — ROPIVACAINE HYDROCHLORIDE 5 MG/ML
INJECTION, SOLUTION EPIDURAL; INFILTRATION; PERINEURAL AS NEEDED
Status: DISCONTINUED | OUTPATIENT
Start: 2023-02-14 | End: 2023-02-14 | Stop reason: HOSPADM

## 2023-02-14 RX ORDER — ACETAMINOPHEN 500 MG
1000 TABLET ORAL ONCE
Status: COMPLETED | OUTPATIENT
Start: 2023-02-14 | End: 2023-02-14

## 2023-02-14 RX ORDER — FAMOTIDINE 20 MG/1
20 TABLET, FILM COATED ORAL
Status: COMPLETED | OUTPATIENT
Start: 2023-02-14 | End: 2023-02-14

## 2023-02-14 RX ORDER — ROCURONIUM BROMIDE 10 MG/ML
INJECTION, SOLUTION INTRAVENOUS AS NEEDED
Status: DISCONTINUED | OUTPATIENT
Start: 2023-02-14 | End: 2023-02-14 | Stop reason: SURG

## 2023-02-14 RX ORDER — MEPERIDINE HYDROCHLORIDE 25 MG/ML
12.5 INJECTION INTRAMUSCULAR; INTRAVENOUS; SUBCUTANEOUS
Status: DISCONTINUED | OUTPATIENT
Start: 2023-02-14 | End: 2023-02-14 | Stop reason: HOSPADM

## 2023-02-14 RX ORDER — BUPIVACAINE HYDROCHLORIDE 2.5 MG/ML
INJECTION, SOLUTION EPIDURAL; INFILTRATION; INTRACAUDAL
Status: COMPLETED | OUTPATIENT
Start: 2023-02-14 | End: 2023-02-14

## 2023-02-14 RX ORDER — SODIUM CHLORIDE 0.9 % (FLUSH) 0.9 %
1-10 SYRINGE (ML) INJECTION AS NEEDED
Status: DISCONTINUED | OUTPATIENT
Start: 2023-02-14 | End: 2023-02-14 | Stop reason: HOSPADM

## 2023-02-14 RX ORDER — OXYCODONE HYDROCHLORIDE 5 MG/1
5 TABLET ORAL EVERY 4 HOURS PRN
Status: DISCONTINUED | OUTPATIENT
Start: 2023-02-14 | End: 2023-02-14 | Stop reason: HOSPADM

## 2023-02-14 RX ORDER — ONDANSETRON 2 MG/ML
4 INJECTION INTRAMUSCULAR; INTRAVENOUS EVERY 6 HOURS PRN
Status: DISCONTINUED | OUTPATIENT
Start: 2023-02-14 | End: 2023-02-14 | Stop reason: HOSPADM

## 2023-02-14 RX ORDER — OXYCODONE HYDROCHLORIDE 5 MG/1
5 TABLET ORAL EVERY 4 HOURS PRN
Qty: 40 TABLET | Refills: 0 | Status: SHIPPED | OUTPATIENT
Start: 2023-02-14 | End: 2023-02-15 | Stop reason: SDUPTHER

## 2023-02-14 RX ORDER — SODIUM CHLORIDE, SODIUM LACTATE, POTASSIUM CHLORIDE, CALCIUM CHLORIDE 600; 310; 30; 20 MG/100ML; MG/100ML; MG/100ML; MG/100ML
9 INJECTION, SOLUTION INTRAVENOUS CONTINUOUS PRN
Status: DISCONTINUED | OUTPATIENT
Start: 2023-02-14 | End: 2023-02-14 | Stop reason: HOSPADM

## 2023-02-14 RX ORDER — PROPOFOL 10 MG/ML
VIAL (ML) INTRAVENOUS AS NEEDED
Status: DISCONTINUED | OUTPATIENT
Start: 2023-02-14 | End: 2023-02-14 | Stop reason: SURG

## 2023-02-14 RX ORDER — SODIUM CHLORIDE 0.9 % (FLUSH) 0.9 %
10 SYRINGE (ML) INJECTION AS NEEDED
Status: CANCELLED | OUTPATIENT
Start: 2023-02-14

## 2023-02-14 RX ORDER — HYDROMORPHONE HYDROCHLORIDE 1 MG/ML
0.5 INJECTION, SOLUTION INTRAMUSCULAR; INTRAVENOUS; SUBCUTANEOUS
Status: DISCONTINUED | OUTPATIENT
Start: 2023-02-14 | End: 2023-02-14 | Stop reason: HOSPADM

## 2023-02-14 RX ORDER — ROPIVACAINE HYDROCHLORIDE 2 MG/ML
1 INJECTION, SOLUTION EPIDURAL; INFILTRATION; PERINEURAL CONTINUOUS
Start: 2023-02-14

## 2023-02-14 RX ORDER — MAGNESIUM HYDROXIDE 1200 MG/15ML
LIQUID ORAL AS NEEDED
Status: DISCONTINUED | OUTPATIENT
Start: 2023-02-14 | End: 2023-02-14 | Stop reason: HOSPADM

## 2023-02-14 RX ORDER — CEFAZOLIN SODIUM 2 G/100ML
2 INJECTION, SOLUTION INTRAVENOUS EVERY 8 HOURS
Status: DISCONTINUED | OUTPATIENT
Start: 2023-02-14 | End: 2023-02-14 | Stop reason: HOSPADM

## 2023-02-14 RX ORDER — HYDROMORPHONE HYDROCHLORIDE 2 MG/ML
INJECTION, SOLUTION INTRAMUSCULAR; INTRAVENOUS; SUBCUTANEOUS AS NEEDED
Status: DISCONTINUED | OUTPATIENT
Start: 2023-02-14 | End: 2023-02-14 | Stop reason: SURG

## 2023-02-14 RX ORDER — LIDOCAINE HYDROCHLORIDE 10 MG/ML
0.5 INJECTION, SOLUTION EPIDURAL; INFILTRATION; INTRACAUDAL; PERINEURAL ONCE AS NEEDED
Status: COMPLETED | OUTPATIENT
Start: 2023-02-14 | End: 2023-02-14

## 2023-02-14 RX ORDER — LIDOCAINE HYDROCHLORIDE 10 MG/ML
INJECTION, SOLUTION EPIDURAL; INFILTRATION; INTRACAUDAL; PERINEURAL AS NEEDED
Status: DISCONTINUED | OUTPATIENT
Start: 2023-02-14 | End: 2023-02-14 | Stop reason: SURG

## 2023-02-14 RX ORDER — EPHEDRINE SULFATE 50 MG/ML
5 INJECTION, SOLUTION INTRAVENOUS ONCE AS NEEDED
Status: DISCONTINUED | OUTPATIENT
Start: 2023-02-14 | End: 2023-02-14 | Stop reason: HOSPADM

## 2023-02-14 RX ORDER — SODIUM CHLORIDE 0.9 % (FLUSH) 0.9 %
10 SYRINGE (ML) INJECTION EVERY 12 HOURS SCHEDULED
Status: CANCELLED | OUTPATIENT
Start: 2023-02-14

## 2023-02-14 RX ORDER — LABETALOL HYDROCHLORIDE 5 MG/ML
10 INJECTION, SOLUTION INTRAVENOUS EVERY 4 HOURS PRN
Status: DISCONTINUED | OUTPATIENT
Start: 2023-02-14 | End: 2023-02-14 | Stop reason: HOSPADM

## 2023-02-14 RX ORDER — MELOXICAM 7.5 MG/1
15 TABLET ORAL DAILY
Status: DISCONTINUED | OUTPATIENT
Start: 2023-02-14 | End: 2023-02-14 | Stop reason: HOSPADM

## 2023-02-14 RX ORDER — SODIUM CHLORIDE, SODIUM LACTATE, POTASSIUM CHLORIDE, CALCIUM CHLORIDE 600; 310; 30; 20 MG/100ML; MG/100ML; MG/100ML; MG/100ML
100 INJECTION, SOLUTION INTRAVENOUS CONTINUOUS
Status: DISCONTINUED | OUTPATIENT
Start: 2023-02-14 | End: 2023-02-14 | Stop reason: HOSPADM

## 2023-02-14 RX ORDER — ONDANSETRON 2 MG/ML
INJECTION INTRAMUSCULAR; INTRAVENOUS AS NEEDED
Status: DISCONTINUED | OUTPATIENT
Start: 2023-02-14 | End: 2023-02-14 | Stop reason: SURG

## 2023-02-14 RX ORDER — SODIUM CHLORIDE 9 MG/ML
40 INJECTION, SOLUTION INTRAVENOUS AS NEEDED
Status: DISCONTINUED | OUTPATIENT
Start: 2023-02-14 | End: 2023-02-14 | Stop reason: HOSPADM

## 2023-02-14 RX ORDER — ONDANSETRON 2 MG/ML
4 INJECTION INTRAMUSCULAR; INTRAVENOUS ONCE AS NEEDED
Status: DISCONTINUED | OUTPATIENT
Start: 2023-02-14 | End: 2023-02-14 | Stop reason: HOSPADM

## 2023-02-14 RX ORDER — ONDANSETRON 4 MG/1
4 TABLET, FILM COATED ORAL EVERY 6 HOURS PRN
Status: DISCONTINUED | OUTPATIENT
Start: 2023-02-14 | End: 2023-02-14 | Stop reason: HOSPADM

## 2023-02-14 RX ORDER — TRANEXAMIC ACID 10 MG/ML
1000 INJECTION, SOLUTION INTRAVENOUS ONCE
Status: COMPLETED | OUTPATIENT
Start: 2023-02-14 | End: 2023-02-14

## 2023-02-14 RX ORDER — DEXAMETHASONE SODIUM PHOSPHATE 4 MG/ML
INJECTION, SOLUTION INTRA-ARTICULAR; INTRALESIONAL; INTRAMUSCULAR; INTRAVENOUS; SOFT TISSUE AS NEEDED
Status: DISCONTINUED | OUTPATIENT
Start: 2023-02-14 | End: 2023-02-14 | Stop reason: SURG

## 2023-02-14 RX ORDER — PHENYLEPHRINE HCL IN 0.9% NACL 1 MG/10 ML
SYRINGE (ML) INTRAVENOUS AS NEEDED
Status: DISCONTINUED | OUTPATIENT
Start: 2023-02-14 | End: 2023-02-14 | Stop reason: SURG

## 2023-02-14 RX ORDER — SODIUM CHLORIDE 9 MG/ML
120 INJECTION, SOLUTION INTRAVENOUS CONTINUOUS
Status: DISCONTINUED | OUTPATIENT
Start: 2023-02-14 | End: 2023-02-14 | Stop reason: HOSPADM

## 2023-02-14 RX ORDER — TAMSULOSIN HYDROCHLORIDE 0.4 MG/1
0.4 CAPSULE ORAL NIGHTLY
Status: DISCONTINUED | OUTPATIENT
Start: 2023-02-14 | End: 2023-02-14 | Stop reason: HOSPADM

## 2023-02-14 RX ORDER — SODIUM CHLORIDE 0.9 % (FLUSH) 0.9 %
10 SYRINGE (ML) INJECTION EVERY 12 HOURS SCHEDULED
Status: DISCONTINUED | OUTPATIENT
Start: 2023-02-14 | End: 2023-02-14 | Stop reason: HOSPADM

## 2023-02-14 RX ORDER — ROPIVACAINE HYDROCHLORIDE 2 MG/ML
INJECTION, SOLUTION EPIDURAL; INFILTRATION; PERINEURAL CONTINUOUS
Status: DISCONTINUED | OUTPATIENT
Start: 2023-02-14 | End: 2023-02-14 | Stop reason: HOSPADM

## 2023-02-14 RX ADMIN — MELOXICAM 15 MG: 15 TABLET ORAL at 08:39

## 2023-02-14 RX ADMIN — Medication 100 MCG: at 11:18

## 2023-02-14 RX ADMIN — SUGAMMADEX 200 MG: 100 INJECTION, SOLUTION INTRAVENOUS at 11:57

## 2023-02-14 RX ADMIN — HYDROMORPHONE HYDROCHLORIDE 1 MG: 2 INJECTION, SOLUTION INTRAMUSCULAR; INTRAVENOUS; SUBCUTANEOUS at 11:58

## 2023-02-14 RX ADMIN — Medication 50 MCG: at 10:32

## 2023-02-14 RX ADMIN — Medication 100 MCG: at 10:28

## 2023-02-14 RX ADMIN — BUPIVACAINE HYDROCHLORIDE 30 ML: 2.5 INJECTION, SOLUTION EPIDURAL; INFILTRATION; INTRACAUDAL at 10:14

## 2023-02-14 RX ADMIN — CEFAZOLIN SODIUM 2 G: 2 INJECTION, SOLUTION INTRAVENOUS at 10:16

## 2023-02-14 RX ADMIN — Medication 100 MCG: at 10:51

## 2023-02-14 RX ADMIN — EPHEDRINE SULFATE 5 MG: 50 INJECTION INTRAVENOUS at 10:51

## 2023-02-14 RX ADMIN — ACETAMINOPHEN 1000 MG: 500 TABLET ORAL at 08:39

## 2023-02-14 RX ADMIN — LIDOCAINE HYDROCHLORIDE 50 MG: 10 INJECTION, SOLUTION EPIDURAL; INFILTRATION; INTRACAUDAL; PERINEURAL at 10:11

## 2023-02-14 RX ADMIN — Medication 100 MCG: at 10:22

## 2023-02-14 RX ADMIN — LIDOCAINE HYDROCHLORIDE 0.5 ML: 10 INJECTION, SOLUTION EPIDURAL; INFILTRATION; INTRACAUDAL; PERINEURAL at 08:39

## 2023-02-14 RX ADMIN — SODIUM CHLORIDE, POTASSIUM CHLORIDE, SODIUM LACTATE AND CALCIUM CHLORIDE 9 ML/HR: 600; 310; 30; 20 INJECTION, SOLUTION INTRAVENOUS at 08:39

## 2023-02-14 RX ADMIN — Medication 100 MCG: at 11:49

## 2023-02-14 RX ADMIN — CEFAZOLIN SODIUM 2 G: 2 INJECTION, SOLUTION INTRAVENOUS at 16:16

## 2023-02-14 RX ADMIN — BUPIVACAINE HYDROCHLORIDE 20 ML: 2.5 INJECTION, SOLUTION EPIDURAL; INFILTRATION; INTRACAUDAL at 12:01

## 2023-02-14 RX ADMIN — FAMOTIDINE 20 MG: 20 TABLET, FILM COATED ORAL at 08:39

## 2023-02-14 RX ADMIN — PROPOFOL 200 MG: 10 INJECTION, EMULSION INTRAVENOUS at 10:11

## 2023-02-14 RX ADMIN — ONDANSETRON HYDROCHLORIDE 4 MG: 4 TABLET, FILM COATED ORAL at 17:44

## 2023-02-14 RX ADMIN — EPHEDRINE SULFATE 5 MG: 50 INJECTION INTRAVENOUS at 10:42

## 2023-02-14 RX ADMIN — Medication 100 MCG: at 10:42

## 2023-02-14 RX ADMIN — Medication 100 MCG: at 10:30

## 2023-02-14 RX ADMIN — ONDANSETRON 4 MG: 2 INJECTION INTRAMUSCULAR; INTRAVENOUS at 11:41

## 2023-02-14 RX ADMIN — EPHEDRINE SULFATE 10 MG: 50 INJECTION INTRAVENOUS at 11:18

## 2023-02-14 RX ADMIN — TRANEXAMIC ACID 1000 MG: 10 INJECTION, SOLUTION INTRAVENOUS at 11:10

## 2023-02-14 RX ADMIN — EPHEDRINE SULFATE 10 MG: 50 INJECTION INTRAVENOUS at 10:36

## 2023-02-14 RX ADMIN — MELOXICAM 15 MG: 7.5 TABLET ORAL at 16:17

## 2023-02-14 RX ADMIN — TRANEXAMIC ACID 1000 MG: 10 INJECTION, SOLUTION INTRAVENOUS at 10:21

## 2023-02-14 RX ADMIN — FENTANYL CITRATE 50 MCG: 50 INJECTION, SOLUTION INTRAMUSCULAR; INTRAVENOUS at 11:13

## 2023-02-14 RX ADMIN — DEXAMETHASONE SODIUM PHOSPHATE 8 MG: 4 INJECTION, SOLUTION INTRAMUSCULAR; INTRAVENOUS at 10:35

## 2023-02-14 RX ADMIN — HYDROMORPHONE HYDROCHLORIDE 1 MG: 2 INJECTION, SOLUTION INTRAMUSCULAR; INTRAVENOUS; SUBCUTANEOUS at 11:44

## 2023-02-14 RX ADMIN — Medication 100 MCG: at 11:31

## 2023-02-14 RX ADMIN — PREGABALIN 150 MG: 150 CAPSULE ORAL at 08:39

## 2023-02-14 RX ADMIN — ROCURONIUM BROMIDE 50 MG: 10 INJECTION, SOLUTION INTRAVENOUS at 10:11

## 2023-02-14 RX ADMIN — FENTANYL CITRATE 50 MCG: 50 INJECTION, SOLUTION INTRAMUSCULAR; INTRAVENOUS at 11:19

## 2023-02-14 RX ADMIN — OXYCODONE 5 MG: 5 TABLET ORAL at 17:44

## 2023-02-14 NOTE — INTERVAL H&P NOTE
[facility] Pre-op    Full history and physical note from office is attached.    /95 (BP Location: Right arm, Patient Position: Lying)   Pulse 84   Temp 97.1 °F (36.2 °C) (Temporal)   Resp 18   SpO2 98%     Immunizations:  Influenza:  2022  Pneumococcal:  No  Tetanus:  UTD  Covid 1 of 1: 2021      LAB Results:  Lab Results   Component Value Date    WBC 10.15 02/07/2023    HGB 15.3 02/07/2023    HCT 46.2 02/07/2023    MCV 88.2 02/07/2023     02/07/2023    NEUTROABS 4.96 02/07/2023    GLUCOSE 77 02/07/2023    BUN 18 02/07/2023    CREATININE 0.90 02/07/2023    EGFRIFNONA 63 10/11/2021    EGFRIFAFRI 106 12/08/2014     02/07/2023    K 4.1 02/07/2023     02/07/2023    CO2 24.0 02/07/2023    CALCIUM 9.8 02/07/2023    ALBUMIN 4.20 10/18/2022    AST 26 10/18/2022    ALT 28 10/18/2022    BILITOT 0.7 10/18/2022    PTT 28.5 02/07/2023    INR 0.97 02/07/2023 2/7/23 knee xray:  Study Result    Narrative & Impression   Right Knee Radiographs  Indication: right knee pain  Views: Standing AP's and skiers of both knees, with lateral and sunrise views of the right knee     Comparison: 6/13/2022     Findings:   Bone-on-bone contact medial compartment, tricompartmental osteophytes, varus alignment, no acute bony abnormalities.  Mild worsening compared to previous imaging.  No unusual bony features.       Cancer Staging (if applicable)  Cancer Patient: __ yes __no __unknown__N/A; If yes, clinical stage T:__ N:__M:__, stage group or __N/A      Impression: Degenerative arthritis of right knee      Plan: TOTAL KNEE ARTHROPLASTY WITH CORI ROBOT - RIGHT      TYSON Espinal   2/14/2023   08:52 EST

## 2023-02-14 NOTE — DISCHARGE INSTRUCTIONS
InfuBLOCK - Patient Information    What is a pain pump?  InfuBLOCK is a postoperative, non-narcotic pain relief system that delivers local anesthetic to or near the surgical site. This is a pain minimizing therapy that delivers an anesthetic (numbing) medicine to the nerve.    The InfuBLOCK pain pump will continuously deliver a local anesthetic medication to block the pain in the area of your procedure.    Where can I find information about my pain pump?           For more information about your pain pump, scan the QR code.  For additional patient resources, visit [email or web address].                                                                                             The [employer] Nursing Hotline is Here for You 24/7.     Call [phone] for Assistance.  While your physician is your primary source for information about your treatment., there may be times during your treatment that you need assistance with your infusion pump. Our team of compassionate and knowledgeable Registered Nursed (RN) is here to assist every step of the way.    Answers to questions about your infusion pump                 Tubing disconnect  Assistance with pump alarms                                                      Dislodged catheter  Excessive leakage noted from pump                                         Inadequate pain control         Nerve Catheter Removal Instructions  When your device is empty:    Remove your catheter by pulling the dressing off slowly (like you would remove a regular bandage). The catheter should pull right out of the skin.  Check that the BLUE tip is intact.                                                                                     If the catheter is stuck, reposition your   extremity and pull slowly until removed.  *If catheter is HURTING and WON'T come out, stop and call [phone] for further assistance.    Remove medication bag from the black carrying case.  Cut  the tubing on right and left side of pump, and discard the medication bag and tubing into garbage.  Place the pump and black carrying case into the plastic bag and then place this into the return box.  Seal box with blue stickers and return to [employer] postal service.    THIS IS PRE-PAID POSTAGE.       SMI COLD THERAPY - PATIENT INSTRUCTION SHEET    Cold Compression Therapy for your comfort and rehabilitation  Your caregivers want you to be productive in your rehab and comfortable during your stay. In keeping with those goals, you will be receiving an SMI Cold Therapy Wrap to help ease post-operative pain and swelling that might keep you from getting back on track! Your SMI Cold Therapy Wrap is effective and simple-to-use, and you will be encouraged to apply it throughout your hospital stay and at home through the duration of your recovery.    When you are ready to go home  Be sure to take your SMI Cold Therapy Wrap and both sets of Gel Bags with you for continued comfort and use throughout your rehabilitation. If you don't already have them, ask your nurse or aide to retrieve your SMI Gel Bags from the patient freezer.    Home use precautions  Always follow your medical professional's application instructions upon discharge. Your SMI Cold Therapy Wrap and Gel Bags are designed to last for months following your surgery. Never heat the Gel Bags unless specified by your healthcare provider. Supervision is advised when using this product on children or geriatric patients. To avoid danger of suffocation, please keep the outer plastic packaging away from children & pets.    Cold Therapy Instructions  Place Gel Bags in a freezer set ¾ of the way to max temperature for at least (4) hours. For best results, lay the Gel Bags flat and [id number] in the freezer. Once frozen, slide Gel Bags into the gel pouch and secure your wrap to the affected area with the straps.  Gel wraps that have been stored in a freezer for an extended  period of time may require a (10) minute period of softening up in a room temperature environment before application.  The gel pouch acts as a protective barrier. NEVER place frozen bags directly onto skin, as this may cause frostbite injury.  The [facility] Cold Therapy Wrap is designed to be able to be worm while ambulating. The compression straps can be secured well enough so that the Wrap won't fall off while moving.  Wrap Application Videos can be viewed at [email or web address].  An additional protective barrier such as clothing, a washcloth, hand-towel or pillowcase may be used during prolonged treatment applications.  The Gel-Pouch and Wrap are both Latex-Free and the Gel Bag ingredients are non toxic.    [facility] Wrap care instructions  The [facility] Cold Therapy Wrap may be hand washed and hung to dry when needed.    [facility] re-order information  Additional [facility] body specific wraps and/or Gel Bags can be re-ordered from [email or web address] or call [email or web address] ([phone])

## 2023-02-14 NOTE — ANESTHESIA PREPROCEDURE EVALUATION
Anesthesia Evaluation     Patient summary reviewed and Nursing notes reviewed   no history of anesthetic complications:  NPO Solid Status: > 8 hours  NPO Liquid Status: > 2 hours           Airway   Mallampati: II  TM distance: >3 FB  Neck ROM: full  No difficulty expected  Dental    (+) poor dentition    Pulmonary     breath sounds clear to auscultation  Cardiovascular   Exercise tolerance: good (4-7 METS)    ECG reviewed  PT is on anticoagulation therapy  Patient on routine beta blocker and Beta blocker given within 24 hours of surgery  Rhythm: regular  Rate: normal    (+) hypertension well controlled 2 medications or greater, CAD, hyperlipidemia,       Neuro/Psych  GI/Hepatic/Renal/Endo    (+) obesity,  GERD well controlled,  renal disease stones, diabetes mellitus type 2 well controlled,   (-) morbid obesity    Musculoskeletal     Abdominal   (+) obese,     Abdomen: soft.   Substance History      OB/GYN          Other   arthritis,                      Anesthesia Plan    ASA 3     general with block     (OFF EFFIENT FOR 5 DAYS)  intravenous induction     Anesthetic plan, risks, benefits, and alternatives have been provided, discussed and informed consent has been obtained with: patient.    Plan discussed with CRNA.        CODE STATUS:

## 2023-02-14 NOTE — ANESTHESIA PROCEDURE NOTES
Airway  Urgency: elective    Date/Time: 2/14/2023 10:13 AM  Airway not difficult    General Information and Staff    Patient location during procedure: OR  CRNA/CAA: Escobar De Leon CRNA    Indications and Patient Condition  Indications for airway management: airway protection    Preoxygenated: yes  MILS not maintained throughout  Mask difficulty assessment: 1 - vent by mask    Final Airway Details  Final airway type: endotracheal airway      Successful airway: ETT  Cuffed: yes   Successful intubation technique: direct laryngoscopy  Endotracheal tube insertion site: oral  Blade: Jose F  Blade size: 4  ETT size (mm): 7.5  Cormack-Lehane Classification: grade I - full view of glottis  Placement verified by: chest auscultation and capnometry   Cuff volume (mL): 8  Measured from: lips  ETT/EBT  to lips (cm): 20  Number of attempts at approach: 1  Assessment: lips, teeth, and gum same as pre-op and atraumatic intubation    Additional Comments  Negative epigastric sounds, Breath sound equal bilaterally with symmetric chest rise and fall

## 2023-02-14 NOTE — DISCHARGE PLACEMENT REQUEST
"Buzz Birmingham (62 y.o. Male)     Gita Martinez RN   [phone]        Date of Birth   1960    Social Security Number       Address   [address] [id number]    Home Phone   [phone]    MRN   [id number]       Congregation   None    Marital Status                               Admission Date   23    Admission Type   Elective    Admitting Provider   Prince Castellanos MD    Attending Provider   Prince Castellanos MD    Department, Room/Bed   [address], S383/1       Discharge Date       Discharge Disposition       Discharge Destination                               Attending Provider: Prince Castellanos MD    Allergies: Atorvastatin    Isolation: None   Infection: None   Code Status: Not on file    Ht: 182.9 cm (72\")   Wt: 118 kg (260 lb 7.6 oz)    Admission Cmt: None   Principal Problem: S/P total knee replacement, right [Z96.651]                 Active Insurance as of 2023     Primary Coverage     Payor Plan Insurance Group Employer/Plan Group    [employer] [employer] EMPLOYEE [id number]     Payor Plan Address Payor Plan Phone Number Payor Plan Fax Number Effective Dates    [address] [phone]  2022 - None Entered    [facility] [id number]       Subscriber Name Subscriber Birth Date Member ID       BUZZ BIRMINGHAM 1960 [id number]                 Emergency Contacts      (Rel.) Home Phone Work Phone Mobile Phone    Gabrielle Birmingham (Spouse) [phone] -- --           [address] [facility] [address]  Phone:  [phone]  Fax:  [phone] Date: 2023      Ambulatory Referral to Physical Therapy     Patient:  Buzz Birmingham MRN:  [id number]   [address] [facility] [address] :  1960  SSN:    Phone: [phone] Sex:  M      INSURANCE PAYOR PLAN GROUP # SUBSCRIBER ID   Primary:    VANESSA STEPHEN [id number] " [id number]      Referring Provider Information:  PRINCE CASTELLANOS Phone: [phone] Fax: [phone]       Referral Information:   # Visits:  1 Referral Type: Physical Therapy [AE1]   Urgency:  Routine Referral Reason: Specialty Services Required   Start Date: Feb 14, 2023 End Date:  To be determined by Insurer   Diagnosis: Primary osteoarthritis of right knee (M17.11 [ICD-10-CM] 715.16 [ICD-9-CM])  S/P total knee replacement, right (Z96.651 [ICD-10-CM] V43.65 [ICD-9-CM])      Refer to Dept:   Refer to Provider:   Refer to Provider Phone:   Refer to Facility:       Follow-up needed: Yes     This document serves as a request of services and does not constitute Insurance authorization or approval of services.  To determine eligibility, please contact the members Insurance carrier to verify and review coverage.     If you have medical questions regarding this request for services. Please contact [address] at [phone] during normal business hours.        Authorizing Provider:Prince Castellanos MD  Authorizing Provider's NPI: [id number]  Order Entered By: Gita Martinez RN 2/14/2023  1:44 PM     Electronically signed by: Prince Castellanos MD 2/14/2023  1:44 PM         Insurance Information                [employer] EMPLOYEE Phone: [phone]    Subscriber: Buzz Elmore Subscriber#: [id number]    Group#: [id number] Precert#: --             History & Physical      Lindsay Cotton APRN at 02/14/23 0851     Attestation signed by Prince Castellanos MD at 02/14/23 0919    Agree with above.  Plan for right total knee arthroplasty.    Prince Castellanos MD  02/14/23  09:18 EST                  [facility] Pre-op    Full history and physical note from office is attached.    /95 (BP Location: Right arm, Patient Position: Lying)   Pulse 84   Temp 97.1 °F (36.2 °C) (Temporal)   Resp 18   SpO2 98%     Immunizations:  Influenza:  2022  Pneumococcal:   No  Tetanus:  UTD  Covid 1 of 1: 2021      LAB Results:  Lab Results   Component Value Date    WBC 10.15 02/07/2023    HGB 15.3 02/07/2023    HCT 46.2 02/07/2023    MCV 88.2 02/07/2023     02/07/2023    NEUTROABS 4.96 02/07/2023    GLUCOSE 77 02/07/2023    BUN 18 02/07/2023    CREATININE 0.90 02/07/2023    EGFRIFNONA 63 10/11/2021    EGFRIFAFRI 106 12/08/2014     02/07/2023    K 4.1 02/07/2023     02/07/2023    CO2 24.0 02/07/2023    CALCIUM 9.8 02/07/2023    ALBUMIN 4.20 10/18/2022    AST 26 10/18/2022    ALT 28 10/18/2022    BILITOT 0.7 10/18/2022    PTT 28.5 02/07/2023    INR 0.97 02/07/2023 2/7/23 knee xray:  Study Result    Narrative & Impression   Right Knee Radiographs  Indication: right knee pain  Views: Standing AP's and skiers of both knees, with lateral and sunrise views of the right knee     Comparison: 6/13/2022     Findings:   Bone-on-bone contact medial compartment, tricompartmental osteophytes, varus alignment, no acute bony abnormalities.  Mild worsening compared to previous imaging.  No unusual bony features.       Cancer Staging (if applicable)  Cancer Patient: __ yes __no __unknown__N/A; If yes, clinical stage T:__ N:__M:__, stage group or __N/A      Impression: Degenerative arthritis of right knee      Plan: TOTAL KNEE ARTHROPLASTY WITH CORI ROBOT - RIGHT      Lindsay Cotton, APRN   2/14/2023   08:52 EST     Electronically signed by Prince Castellanos MD at 02/14/23 0919   Source Note              [facility] Orthopaedic Surgery Clinic Note    Subjective     Chief Complaint   Patient presents with   • Follow-up     3 months 1 week follow up - Primary osteoarthritis of both knees         HPI    It has been 3  month(s) 1 week  since Mr. Elmore's last visit. He returns to clinic today for follow-up of bilateral knee arthritis. The issue has been ongoing for 3 1/2 year(s). He rates his pain a 8/10 on the pain scale. Previous/current treatments: bracing, NSAIDS and weight loss.  Current symptoms: pain and giving way/buckling. The pain is worse with walking, standing, sitting and any movement of the joint; resting, sitting, ice, heat and pain medication and/or NSAID improve the pain. Overall, he is doing worse.  Right knee bothers him more than the left.  He is considering knee replacement surgery.  He is on Effient with a history of stents, and his cardiologist is Dr. Kitchen.  His wife is able to help out postoperatively.  No long-term relief with injections.    I have reviewed the following portions of the patient's history and agree with: History of Present Illness and Review of Systems    Patient Active Problem List   Diagnosis   • Coagulation disorder (HCC)   • Benign prostatic hyperplasia with urinary obstruction   • Chest pain   • Mechanical complication due to tissue graft   • Atherosclerosis of coronary artery   • Type 2 diabetes mellitus without complication, without long-term current use of insulin (HCC)   • Gastroesophageal reflux disease   • Hyperlipidemia   • Hypertension   • Knee pain   • Lipoma   • Low back pain   • Calculus of kidney   • Muscle pain   • Vitamin D deficiency   • IHD (ischemic heart disease)   • Obesity   • GERD (gastroesophageal reflux disease)   • Chronic low back pain   • Acute serous otitis media   • Mixed conductive and sensorineural hearing loss   • Bilateral sensorineural hearing loss   • Otitis media of left ear   • Left upper quadrant abdominal mass   • Abdominal wall cellulitis   • Colon polyps     Past Medical History:   Diagnosis Date   • Chronic low back pain    • GERD (gastroesophageal reflux disease)    • Hyperlipidemia    • Hypertension    • IHD (ischemic heart disease)    • Knee swelling    • Obesity    • Tear of meniscus of knee    • Tendinitis of knee       Past Surgical History:   Procedure Laterality Date   • CORONARY STENT PLACEMENT     • HAND SURGERY      skin graft on finger   • KNEE ARTHROTOMY      History of Arthrotomy Of Knee With  Medial Meniscectomy   • KNEE SURGERY Right     meniscus      Family History   Problem Relation Age of Onset   • Breast cancer Other    • Colon cancer Other    • Heart disease Other    • Breast cancer Mother    • Heart disease Mother    • Hypertension Mother    • Heart attack Mother    • Cancer Mother    • Colon cancer Father    • Hypertension Father    • Heart attack Father    • Diabetes Father    • Heart disease Paternal Grandfather    • Heart disease Brother      Social History     Socioeconomic History   • Marital status:    Tobacco Use   • Smoking status: Never   • Smokeless tobacco: Never   Vaping Use   • Vaping Use: Never used   Substance and Sexual Activity   • Alcohol use: Yes     Comment: occasional   • Drug use: No   • Sexual activity: Yes     Partners: Female      Current Outpatient Medications on File Prior to Visit   Medication Sig Dispense Refill   • aspirin 81 MG tablet Take 81 mg by mouth Daily.     • carvedilol (COREG) 12.5 MG tablet TAKE ONE TABLET BY MOUTH TWICE A DAY WITH MEALS 60 tablet 11   • Cholecalciferol (VITAMIN D) 2000 UNITS capsule Take 2,000 Units by mouth Daily.     • fluticasone (FLONASE) 50 MCG/ACT nasal spray 2 sprays by Each Nare route Daily. 48 mL 1   • meloxicam (MOBIC) 15 MG tablet Take 1 tablet by mouth Daily. 30 tablet 2   • pantoprazole (PROTONIX) 40 MG EC tablet TAKE ONE TABLET BY MOUTH DAILY 90 tablet 1   • prasugrel (EFFIENT) 10 MG tablet Take 1 tablet by mouth Daily. 90 tablet 3   • rosuvastatin (CRESTOR) 40 MG tablet TAKE ONE TABLET BY MOUTH DAILY 90 tablet 3   • tamsulosin (FLOMAX) 0.4 MG capsule 24 hr capsule Take 1 capsule by mouth Every Night. 30 capsule 0   • telmisartan-hydrochlorothiazide (MICARDIS HCT) 80-25 MG per tablet Take 1 tablet by mouth Daily. 90 tablet 3   • Tirzepatide (Mounjaro) 2.5 MG/0.5ML solution pen-injector Inject 2.5 mg under the skin into the appropriate area as directed 1 (One) Time Per Week. 2 mL 4     No current facility-administered  medications on file prior to visit.      Allergies   Allergen Reactions   • Atorvastatin Myalgia        Review of Systems   Constitutional: Negative for activity change, appetite change, chills, diaphoresis, fatigue, fever and unexpected weight change.   HENT: Negative for congestion, dental problem, drooling, ear discharge, ear pain, facial swelling, hearing loss, mouth sores, nosebleeds, postnasal drip, rhinorrhea, sinus pressure, sneezing, sore throat, tinnitus, trouble swallowing and voice change.    Eyes: Negative for photophobia, pain, discharge, redness, itching and visual disturbance.   Respiratory: Negative for apnea, cough, choking, chest tightness, shortness of breath, wheezing and stridor.    Cardiovascular: Negative for chest pain, palpitations and leg swelling.   Gastrointestinal: Negative for abdominal distention, abdominal pain, anal bleeding, blood in stool, constipation, diarrhea, nausea, rectal pain and vomiting.   Endocrine: Negative for cold intolerance, heat intolerance, polydipsia, polyphagia and polyuria.   Genitourinary: Negative for decreased urine volume, difficulty urinating, dysuria, enuresis, flank pain, frequency, genital sores, hematuria and urgency.   Musculoskeletal: Positive for arthralgias. Negative for back pain, gait problem, joint swelling, myalgias, neck pain and neck stiffness.   Skin: Negative for color change, pallor, rash and wound.   Allergic/Immunologic: Negative for environmental allergies, food allergies and immunocompromised state.   Neurological: Negative for dizziness, tremors, seizures, syncope, facial asymmetry, speech difficulty, weakness, light-headedness, numbness and headaches.   Hematological: Negative for adenopathy. Does not bruise/bleed easily.   Psychiatric/Behavioral: Negative for agitation, behavioral problems, confusion, decreased concentration, dysphoric mood, hallucinations, self-injury, sleep disturbance and suicidal ideas. The patient is not  "nervous/anxious and is not hyperactive.         Objective      Physical Exam  /82   Ht 182.9 cm (72.01\")   Wt 116 kg (255 lb 11.7 oz)   BMI 34.68 kg/m²     Body mass index is 34.68 kg/m².    General:   Mental Status:  Alert   Appearance: Cooperative, in no acute distress   Build and Nutrition: Overweight by BMI male   Orientation: Alert and oriented to person, place and time   Posture: Normal   Gait: Mild limp on the right    Integument:              Right knee: no skin lesions, no rash, no ecchymosis              Left knee: no skin lesions, no rash, no ecchymosis     Lower Extremities:              Right Knee:                          Tenderness:    Medial/lateral joint line tenderness                          Effusion:          None                          Swelling:          None                          Crepitus:          Positive                          Atrophy:           None                          Range of motion:        Extension:       0°                                                              Flexion:           120°  Instability:        No varus laxity, no valgus laxity, negative anterior drawer  Deformities:     Varus              Left Knee:                          Tenderness:    Medial joint line tenderness                          Effusion:          None                          Swelling:          None                          Crepitus:          Positive                          Atrophy:           None                          Range of motion:        Extension:       0°                                                              Flexion:           120°  Instability:        No varus laxity, no valgus laxity, negative anterior drawer  Deformities:     Varus    Imaging/Studies  Imaging Results (Last 24 Hours)     ** No results found for the last 24 hours. **        No new imaging today.    Assessment and Plan     Diagnoses and all orders for this visit:    1. Primary osteoarthritis of " both knees (Primary)        1. Primary osteoarthritis of both knees        I reviewed my findings with the patient.  His knee pain has progressed, to the point where he would like to consider right total knee arthroplasty surgery.  We discussed the risk, benefits, and alternatives.  He would like to talk to his wife before scheduling.  Please see my counseling note for details.    Surgical Counseling     I have informed the patient of the diagnosis and the prognosis.  Exhaustive conservative treatment modalities have not resulted in long term pain relief.  The symptoms have progressed to the point of daily pain and inability to perform activities of daily living without significant pain. The patient has reached the point of desiring to proceed with total knee arthroplasty after discussing the risks, benefits and alternatives to the procedure.  The surgical procedure itself was discussed in detail. Risks of the procedure were discussed, which included but are not limited to, bleeding, infection, damage to blood vessels and nerves, incomplete pain relief, loosening of the prosthesis (early or late), deep infection (early or late), need for further surgery, loss of limb, deep venous thrombosis, pulmonary embolus, death, heart attack, stroke, kidney failure, liver failure, and anesthetic complications.  In addition, the potential for deep infection developing in the future was discussed, which could require further surgery.  The knee would have to be re-opened, debrided, and potentially remove the prosthesis, which may or may not be replaced in the future.  Also, the possibility for loosening of the prosthesis has been mentioned.  If the prosthesis loosened, a revision arthroplasty could be performed, with results that are not as predictable compared to the original procedure.  The typical rehabilitative course has also been discussed, and full recovery may take up to a year to see the maximum benefit.  The importance  of patient cooperation in the rehabilitative efforts has also been discussed.  No guarantees were given.  The patient understands the potential risks versus the benefits and desires to proceed with total knee arthroplasty at a mutually convenient time.    Return for surgery.      Prince Castellanos MD  01/26/23  13:55 EST      Electronically signed by Prince Castellanos MD at 01/26/23 1355             Prince Castellanos MD at 01/25/23 1600              [facility] Note    Subjective     Chief Complaint   Patient presents with   • Follow-up     3 months 1 week follow up - Primary osteoarthritis of both knees         HPI    It has been 3  month(s) 1 week  since Mr. Elmore's last visit. He returns to clinic today for follow-up of bilateral knee arthritis. The issue has been ongoing for 3 1/2 year(s). He rates his pain a 8/10 on the pain scale. Previous/current treatments: bracing, NSAIDS and weight loss. Current symptoms: pain and giving way/buckling. The pain is worse with walking, standing, sitting and any movement of the joint; resting, sitting, ice, heat and pain medication and/or NSAID improve the pain. Overall, he is doing worse.  Right knee bothers him more than the left.  He is considering knee replacement surgery.  He is on Effient with a history of stents, and his cardiologist is Dr. Kitchen.  His wife is able to help out postoperatively.  No long-term relief with injections.    I have reviewed the following portions of the patient's history and agree with: History of Present Illness and Review of Systems    Patient Active Problem List   Diagnosis   • Coagulation disorder (HCC)   • Benign prostatic hyperplasia with urinary obstruction   • Chest pain   • Mechanical complication due to tissue graft   • Atherosclerosis of coronary artery   • Type 2 diabetes mellitus without complication, without long-term current use of insulin (HCC)   • Gastroesophageal reflux disease   • Hyperlipidemia   •  Hypertension   • Knee pain   • Lipoma   • Low back pain   • Calculus of kidney   • Muscle pain   • Vitamin D deficiency   • IHD (ischemic heart disease)   • Obesity   • GERD (gastroesophageal reflux disease)   • Chronic low back pain   • Acute serous otitis media   • Mixed conductive and sensorineural hearing loss   • Bilateral sensorineural hearing loss   • Otitis media of left ear   • Left upper quadrant abdominal mass   • Abdominal wall cellulitis   • Colon polyps     Past Medical History:   Diagnosis Date   • Chronic low back pain    • GERD (gastroesophageal reflux disease)    • Hyperlipidemia    • Hypertension    • IHD (ischemic heart disease)    • Knee swelling    • Obesity    • Tear of meniscus of knee    • Tendinitis of knee       Past Surgical History:   Procedure Laterality Date   • CORONARY STENT PLACEMENT     • HAND SURGERY      skin graft on finger   • KNEE ARTHROTOMY      History of Arthrotomy Of Knee With Medial Meniscectomy   • KNEE SURGERY Right     meniscus      Family History   Problem Relation Age of Onset   • Breast cancer Other    • Colon cancer Other    • Heart disease Other    • Breast cancer Mother    • Heart disease Mother    • Hypertension Mother    • Heart attack Mother    • Cancer Mother    • Colon cancer Father    • Hypertension Father    • Heart attack Father    • Diabetes Father    • Heart disease Paternal Grandfather    • Heart disease Brother      Social History     Socioeconomic History   • Marital status:    Tobacco Use   • Smoking status: Never   • Smokeless tobacco: Never   Vaping Use   • Vaping Use: Never used   Substance and Sexual Activity   • Alcohol use: Yes     Comment: occasional   • Drug use: No   • Sexual activity: Yes     Partners: Female      Current Outpatient Medications on File Prior to Visit   Medication Sig Dispense Refill   • aspirin 81 MG tablet Take 81 mg by mouth Daily.     • carvedilol (COREG) 12.5 MG tablet TAKE ONE TABLET BY MOUTH TWICE A DAY WITH  MEALS 60 tablet 11   • Cholecalciferol (VITAMIN D) 2000 UNITS capsule Take 2,000 Units by mouth Daily.     • fluticasone (FLONASE) 50 MCG/ACT nasal spray 2 sprays by Each Nare route Daily. 48 mL 1   • meloxicam (MOBIC) 15 MG tablet Take 1 tablet by mouth Daily. 30 tablet 2   • pantoprazole (PROTONIX) 40 MG EC tablet TAKE ONE TABLET BY MOUTH DAILY 90 tablet 1   • prasugrel (EFFIENT) 10 MG tablet Take 1 tablet by mouth Daily. 90 tablet 3   • rosuvastatin (CRESTOR) 40 MG tablet TAKE ONE TABLET BY MOUTH DAILY 90 tablet 3   • tamsulosin (FLOMAX) 0.4 MG capsule 24 hr capsule Take 1 capsule by mouth Every Night. 30 capsule 0   • telmisartan-hydrochlorothiazide (MICARDIS HCT) 80-25 MG per tablet Take 1 tablet by mouth Daily. 90 tablet 3   • Tirzepatide (Mounjaro) 2.5 MG/0.5ML solution pen-injector Inject 2.5 mg under the skin into the appropriate area as directed 1 (One) Time Per Week. 2 mL 4     No current facility-administered medications on file prior to visit.      Allergies   Allergen Reactions   • Atorvastatin Myalgia        Review of Systems   Constitutional: Negative for activity change, appetite change, chills, diaphoresis, fatigue, fever and unexpected weight change.   HENT: Negative for congestion, dental problem, drooling, ear discharge, ear pain, facial swelling, hearing loss, mouth sores, nosebleeds, postnasal drip, rhinorrhea, sinus pressure, sneezing, sore throat, tinnitus, trouble swallowing and voice change.    Eyes: Negative for photophobia, pain, discharge, redness, itching and visual disturbance.   Respiratory: Negative for apnea, cough, choking, chest tightness, shortness of breath, wheezing and stridor.    Cardiovascular: Negative for chest pain, palpitations and leg swelling.   Gastrointestinal: Negative for abdominal distention, abdominal pain, anal bleeding, blood in stool, constipation, diarrhea, nausea, rectal pain and vomiting.   Endocrine: Negative for cold intolerance, heat intolerance,  "polydipsia, polyphagia and polyuria.   Genitourinary: Negative for decreased urine volume, difficulty urinating, dysuria, enuresis, flank pain, frequency, genital sores, hematuria and urgency.   Musculoskeletal: Positive for arthralgias. Negative for back pain, gait problem, joint swelling, myalgias, neck pain and neck stiffness.   Skin: Negative for color change, pallor, rash and wound.   Allergic/Immunologic: Negative for environmental allergies, food allergies and immunocompromised state.   Neurological: Negative for dizziness, tremors, seizures, syncope, facial asymmetry, speech difficulty, weakness, light-headedness, numbness and headaches.   Hematological: Negative for adenopathy. Does not bruise/bleed easily.   Psychiatric/Behavioral: Negative for agitation, behavioral problems, confusion, decreased concentration, dysphoric mood, hallucinations, self-injury, sleep disturbance and suicidal ideas. The patient is not nervous/anxious and is not hyperactive.         Objective      Physical Exam  /82   Ht 182.9 cm (72.01\")   Wt 116 kg (255 lb 11.7 oz)   BMI 34.68 kg/m²     Body mass index is 34.68 kg/m².    General:   Mental Status:  Alert   Appearance: Cooperative, in no acute distress   Build and Nutrition: Overweight by BMI male   Orientation: Alert and oriented to person, place and time   Posture: Normal   Gait: Mild limp on the right    Integument:              Right knee: no skin lesions, no rash, no ecchymosis              Left knee: no skin lesions, no rash, no ecchymosis     Lower Extremities:              Right Knee:                          Tenderness:    Medial/lateral joint line tenderness                          Effusion:          None                          Swelling:          None                          Crepitus:          Positive                          Atrophy:           None                          Range of motion: "        Extension:       0°                                                              Flexion:           120°  Instability:        No varus laxity, no valgus laxity, negative anterior drawer  Deformities:     Varus              Left Knee:                          Tenderness:    Medial joint line tenderness                          Effusion:          None                          Swelling:          None                          Crepitus:          Positive                          Atrophy:           None                          Range of motion:        Extension:       0°                                                              Flexion:           120°  Instability:        No varus laxity, no valgus laxity, negative anterior drawer  Deformities:     Varus    Imaging/Studies  Imaging Results (Last 24 Hours)     ** No results found for the last 24 hours. **        No new imaging today.    Assessment and Plan     Diagnoses and all orders for this visit:    1. Primary osteoarthritis of both knees (Primary)        1. Primary osteoarthritis of both knees        I reviewed my findings with the patient.  His knee pain has progressed, to the point where he would like to consider right total knee arthroplasty surgery.  We discussed the risk, benefits, and alternatives.  He would like to talk to his wife before scheduling.  Please see my counseling note for details.    Surgical Counseling     I have informed the patient of the diagnosis and the prognosis.  Exhaustive conservative treatment modalities have not resulted in long term pain relief.  The symptoms have progressed to the point of daily pain and inability to perform activities of daily living without significant pain. The patient has reached the point of desiring to proceed with total knee arthroplasty after discussing the risks, benefits and alternatives to the procedure.  The surgical procedure itself was discussed in detail. Risks of the procedure were discussed,  which included but are not limited to, bleeding, infection, damage to blood vessels and nerves, incomplete pain relief, loosening of the prosthesis (early or late), deep infection (early or late), need for further surgery, loss of limb, deep venous thrombosis, pulmonary embolus, death, heart attack, stroke, kidney failure, liver failure, and anesthetic complications.  In addition, the potential for deep infection developing in the future was discussed, which could require further surgery.  The knee would have to be re-opened, debrided, and potentially remove the prosthesis, which may or may not be replaced in the future.  Also, the possibility for loosening of the prosthesis has been mentioned.  If the prosthesis loosened, a revision arthroplasty could be performed, with results that are not as predictable compared to the original procedure.  The typical rehabilitative course has also been discussed, and full recovery may take up to a year to see the maximum benefit.  The importance of patient cooperation in the rehabilitative efforts has also been discussed.  No guarantees were given.  The patient understands the potential risks versus the benefits and desires to proceed with total knee arthroplasty at a mutually convenient time.    Return for surgery.      Prince Castellanos MD  01/26/23  13:55 EST      Electronically signed by Prince Castellanos MD at 01/26/23 [id number]

## 2023-02-14 NOTE — ANESTHESIA PROCEDURE NOTES
Adductor canal SS      Patient reassessed immediately prior to procedure    Start time: 2/14/2023 10:14 AM  Reason for block: at surgeon's request and post-op pain management  Performed by  KJ/DANK: Escobar De Leon, NEWTON: Ophelia Pollard SRNA  Preanesthetic Checklist  Completed: patient identified, IV checked, site marked, risks and benefits discussed, surgical consent, monitors and equipment checked, pre-op evaluation and timeout performed  Prep:  Pt Position: supine  Sterile barriers:cap, gloves, mask, sterile barriers and washed/disinfected hands  Prep: ChloraPrep  Patient monitoring: blood pressure monitoring, continuous pulse oximetry and EKG  Procedure    Sedation: yes  Performed under: spinal  Guidance:ultrasound guided    ULTRASOUND INTERPRETATION.  Using ultrasound guidance a 20 G gauge needle was placed in close proximity to the nerve, at which point, under ultrasound guidance anesthetic was injected in the area of the nerve and spread of the anesthesia was seen on ultrasound in close proximity thereto.  There were no abnormalities seen on ultrasound; a digital image was taken; and the patient tolerated the procedure with no complications. Images:still images obtained, printed/placed on chart    Laterality:right  Block Type:adductor canal block  Injection Technique:single-shot  Needle Type:Tuohy and echogenic  Needle Gauge:18 G  Resistance on Injection: none  Catheter Size:20 G (20g)    Medications Used: bupivacaine PF (MARCAINE) 0.25 % injection - Injection   30 mL - 2/14/2023 10:14:00 AM      Post Assessment  Injection Assessment: negative aspiration for heme, incremental injection and no paresthesia on injection  Patient Tolerance:comfortable throughout block  Complications:no  Additional Notes  SINGLE shot   A high-frequency linear transducer, with sterile cover, was placed on the anterior mid-thigh (between the anterior superior iliac spine and patella). The transducer was then moved medially to  "identify the Sartorius muscle (Patrice), Vastus Medialis muscle (VMM), Superficial Femoral Artery (SFA) and Vein. The transducer was then moved cephalad or caudad to position the SFA in the middle of the Patrice. The insertion site was prepped and draped in sterile fashion. Skin and cutaneous tissue was infiltrated with 2-5 ml of 1% Lidocaine. Using ultrasound-guidance, a 20-gauge B-Zimmerman 4\" Ultraplex 360 non-stimulating echogenic needle was advanced in plane from lateral to medial. Preservative-free normal saline was utilized for hydro-dissection of tissue, advancement of needle, and to confirm needle placement below the fascial plane of the Patrice where the Nerve to the VMM is located. Local anesthetic (LA) 5 ml deposited here. The needle continues its path lateral to the SFA at the level of the Saphenous Nerve. The remainder of the LA was deposited at the 10-11 o'clock position of the SFA. This injection created a space between the Patrice and the SFA. Aspiration every 5 ml to prevent intravascular injection. Injection was completed with negative aspiration of blood and negative intravascular injection. Injection pressures were normal with minimal resistance.           "

## 2023-02-14 NOTE — THERAPY EVALUATION
Patient Name: Buzz Elmore  : 1960    MRN: [id number]                              Today's Date: 2023       Admit Date: 2023    Visit Dx:     ICD-10-CM ICD-9-CM   1. S/P total knee replacement, right  Z96.651 V43.65   2. Primary osteoarthritis of right knee  M17.11 715.16     Patient Active Problem List   Diagnosis   • Coagulation disorder (HCC)   • Benign prostatic hyperplasia with urinary obstruction   • Chest pain   • Mechanical complication due to tissue graft   • Atherosclerosis of coronary artery   • Type 2 diabetes mellitus without complication, without long-term current use of insulin (HCC)   • Gastroesophageal reflux disease   • Hyperlipidemia   • Hypertension   • Knee pain   • Lipoma   • Low back pain   • Calculus of kidney   • Muscle pain   • Vitamin D deficiency   • IHD (ischemic heart disease)   • Obesity   • GERD (gastroesophageal reflux disease)   • Chronic low back pain   • Acute serous otitis media   • Mixed conductive and sensorineural hearing loss   • Bilateral sensorineural hearing loss   • Otitis media of left ear   • Left upper quadrant abdominal mass   • Abdominal wall cellulitis   • Colon polyps   • Degenerative arthritis of right knee   • S/P total knee replacement, right     Past Medical History:   Diagnosis Date   • Arthritis    • Chronic low back pain    • Coronary artery disease    • GERD (gastroesophageal reflux disease)    • Hyperlipidemia    • Hypertension    • IHD (ischemic heart disease)    • Knee swelling    • Obesity    • Tear of meniscus of knee    • Tendinitis of knee      Past Surgical History:   Procedure Laterality Date   • CARDIAC CATHETERIZATION     • COLONOSCOPY     • CORONARY STENT PLACEMENT     • HAND SURGERY      skin graft on finger   • KNEE ARTHROTOMY      History of Arthrotomy Of Knee With Medial Meniscectomy   • KNEE SURGERY Right     meniscus      General Information     Row Name 23 1517          Physical Therapy Time and Intention     Document Type evaluation  -     Mode of Treatment physical therapy  -     Row Name 02/14/23 1517          General Information    Patient Profile Reviewed yes  -     Prior Level of Function min assist:;all household mobility;community mobility;gait;transfer;bed mobility;ADL's  -     Existing Precautions/Restrictions fall;other (see comments)  adductor canal nerve cath  -     Barriers to Rehab none identified  -     Row Name 02/14/23 1517          Living Environment    People in Home spouse  -     Row Name 02/14/23 1517          Home Main Entrance    Number of Stairs, Main Entrance five  -     Stair Railings, Main Entrance none  -     Row Name 02/14/23 1517          Stairs Within Home, Primary    Number of Stairs, Within Home, Primary none  -     Row Name 02/14/23 1517          Cognition    Orientation Status (Cognition) oriented x 3  -     Row Name 02/14/23 1517          Safety Issues, Functional Mobility    Safety Issues Affecting Function (Mobility) insight into deficits/self-awareness;awareness of need for assistance;safety precaution awareness  -     Impairments Affecting Function (Mobility) balance;endurance/activity tolerance;pain;strength;range of motion (ROM)  -           User Key  (r) = Recorded By, (t) = Taken By, (c) = Cosigned By    Initials Name Provider Type     Joie Schulte PT Physical Therapist               Mobility     Row Name 02/14/23 1518          Bed Mobility    Bed Mobility supine-sit  -     Supine-Sit [place] (Bed Mobility) standby assist  -     Comment, (Bed Mobility) VC for sequencing  -     Row Name 02/14/23 1518          Transfers    Comment, (Transfers) VC for hand placement  -     Row Name 02/14/23 1518          Sit-Stand Transfer    Sit-Stand [place] (Transfers) contact guard;2 person assist  -     Assistive Device (Sit-Stand Transfers) walker, front-wheeled  -     Row Name 02/14/23 1518          Gait/Stairs (Locomotion)     [place] Level (Gait) contact guard;2 person assist  -HP     Assistive Device (Gait) walker, front-wheeled  -HP     Ambulated day of surgery or within 4 hours of PACU discharge yes  -HP     Distance in Feet (Gait) 325  -HP     Deviations/Abnormal Patterns (Gait) bilateral deviations;base of support, wide;weight shifting decreased;stride length decreased;gait speed decreased  -HP     Bilateral Gait Deviations forward flexed posture;heel strike decreased  -HP     [place] Level (Stairs) contact guard;2 person assist  -HP     Assistive Device (Stairs) cane, straight  -HP     Number of Steps (Stairs) 5  -HP     Ascending Technique (Stairs) step-to-step  -HP     Descending Technique (Stairs) step-to-step  -HP     Stairs, Impairments impaired balance  -HP     Comment, (Gait/Stairs) Pt amb 325' with FWW and CGAx2. Step-through gait pattern with wide DEANNE and decreased stride length B. No knee buckling or LOB noted. Pt navigated 5 steps with SPC and CGAx2. VC for sequencing. Mild unsteadiness noted. Activity limited by fatigue. Spouse present for stair training.  -     Row Name 02/14/23 1518          Mobility    Extremity Weight-bearing Status right lower extremity  -HP     Right Lower Extremity (Weight-bearing Status) weight-bearing as tolerated (WBAT)  -           User Key  (r) = Recorded By, (t) = Taken By, (c) = Cosigned By    Initials Name Provider Type     Joie Schulte PT Physical Therapist               Obj/Interventions     Row Name 02/14/23 1520          Range of Motion Comprehensive    General Range of Motion lower extremity range of motion deficits identified  -     Comment, General Range of Motion R knee ROM 10-90  -     Row Name 02/14/23 1520          Strength Comprehensive (MMT)    General Manual Muscle Testing (MMT) Assessment lower extremity strength deficits identified  -     Comment, General Manual Muscle Testing (MMT) Assessment Pt able to perform B active ankle DF and SLR  -      [facility] Name 02/14/23 1520          Motor Skills    Therapeutic Exercise knee;ankle  -HP     Row Name 02/14/23 1520          Knee (Therapeutic Exercise)    Knee (Therapeutic Exercise) strengthening exercise;isometric exercises  -     Knee Isometrics (Therapeutic Exercise) right;quad sets;10 repetitions  -     Knee Strengthening (Therapeutic Exercise) right;SLR (straight leg raise);LAQ (long arc quad);heel slides;3 repetitions  -HP     Row Name 02/14/23 1520          Ankle (Therapeutic Exercise)    Ankle (Therapeutic Exercise) AROM (active range of motion)  -     Ankle AROM (Therapeutic Exercise) bilateral;dorsiflexion;plantarflexion;10 repetitions  -HP     Row Name 02/14/23 1520          Balance    Balance Assessment sitting static balance;sitting dynamic balance;sit to stand dynamic balance;standing static balance;standing dynamic balance  -     Static Sitting Balance standby assist  -     Dynamic Sitting Balance standby assist  -     Position, Sitting Balance sitting edge of bed  -     Static Standing Balance contact guard  -     Dynamic Standing Balance contact guard  -     Position/Device Used, Standing Balance supported;walker, rolling  -     Balance Interventions sitting;standing;sit to stand;occupation based/functional task  -[facility] Name 02/14/23 1520          Sensory Assessment (Somatosensory)    Sensory Assessment (Somatosensory) LE sensation intact  -           User Key  (r) = Recorded By, (t) = Taken By, (c) = Cosigned By    Initials Name Provider Type     Joie Schulte, PT Physical Therapist               Goals/Plan     Row Name 02/14/23 1523          Bed Mobility Goal 1 (PT)    Activity/Assistive Device (Bed Mobility Goal 1, PT) sit to supine/supine to sit  -     [place] Level/Cues Needed (Bed Mobility Goal 1, PT) modified independence  -     Time Frame (Bed Mobility Goal 1, PT) long term goal (LTG);3 days  -     Progress/Outcomes (Bed Mobility Goal 1, PT) goal  ongoing  -[facility] Name 02/14/23 1523          Transfer Goal 1 (PT)    Activity/Assistive Device (Transfer Goal 1, PT) sit-to-stand/stand-to-sit  -HP     [place] Level/Cues Needed (Transfer Goal 1, PT) modified independence  -HP     Time Frame (Transfer Goal 1, PT) long term goal (LTG);3 days  -HP     Progress/Outcome (Transfer Goal 1, PT) goal ongoing  [facility] Name 02/14/23 1523          Gait Training Goal 1 (PT)    Activity/Assistive Device (Gait Training Goal 1, PT) gait (walking locomotion)  -HP     [place] Level (Gait Training Goal 1, PT) modified independence  -HP     Distance (Gait Training Goal 1, PT) 500  -HP     Time Frame (Gait Training Goal 1, PT) long term goal (LTG);3 days  -HP     Progress/Outcome (Gait Training Goal 1, PT) goal ongoing  [facility] Name 02/14/23 1523          ROM Goal 1 (PT)    ROM Goal 1 (PT) R knee ROM 0-100  -HP     Time Frame (ROM Goal 1, PT) long-term goal (LTG);3 days  -HP     Progress/Outcome (ROM Goal 1, PT) goal ongoing  [facility] Name 02/14/23 1523          Stairs Goal 1 (PT)    Activity/Assistive Device (Stairs Goal 1, PT) ascending stairs;descending stairs  -HP     [place] Level/Cues Needed (Stairs Goal 1, PT) modified independence  -HP     Number of Stairs (Stairs Goal 1, PT) 5  -HP     Time Frame (Stairs Goal 1, PT) long term goal (LTG);3 days  -HP     Progress/Outcome (Stairs Goal 1, PT) goal ongoing  [facility] Name 02/14/23 1523          Therapy Assessment/Plan (PT)    Planned Therapy Interventions (PT) balance training;bed mobility training;gait training;home exercise program;patient/family education;transfer training;stretching;strengthening;stair training;ROM (range of motion)  -           User Key  (r) = Recorded By, (t) = Taken By, (c) = Cosigned By    Initials Name Provider Type    Joie Johnson, PT Physical Therapist               Clinical Impression     [facility] Name 02/14/23 1521          Pain    Pretreatment Pain Rating 4/10  -      Posttreatment Pain Rating 5/10  -HP     Pain Location - Side/Orientation Right  -HP     Pain Location posterior  -HP     Pain Location - knee  -HP     Pain Intervention(s) Repositioned;Cold applied;Ambulation/increased activity;Elevated  -HP     Row Name 02/14/23 1521          Plan of Care Review    Plan of Care Reviewed With patient;spouse  -HP     Progress no change  -HP     Outcome Evaluation PT eval complete. Pt performed bed mobility with SBA. Pt performed STS and amb 325' with FWW and CGAx2. Pt navigated 5 steps with CGAx2 and SPC. VC for sequencing. No LOB or knee buckling noted. Activity limited by fatigue. R knee ROM 10-90. HEP and precautions reviewed with pt. Recommend d/c home with assist and OPPT when medically appropriate. Pt cleared to d/c today from PT standpoint.  -     Row Name 02/14/23 1521          Therapy Assessment/Plan (PT)    Rehab Potential (PT) good, to achieve stated therapy goals  -     Criteria for Skilled Interventions Met (PT) yes;meets criteria;skilled treatment is necessary  -     Therapy Frequency (PT) 2 times/day  -     Row Name 02/14/23 1521          Vital Signs    Pre Systolic BP Rehab --  VSS  -HP     Pre Patient Position Supine  -HP     Intra Patient Position Standing  -HP     Post Patient Position Sitting  -HP     Row Name 02/14/23 1521          Positioning and Restraints    Pre-Treatment Position in bed  -HP     Post Treatment Position chair  -HP     In Chair notified nsg;reclined;sitting;call light within reach;encouraged to call for assist;exit alarm on;with family/caregiver;legs elevated;compression device  -           User Key  (r) = Recorded By, (t) = Taken By, (c) = Cosigned By    Initials Name Provider Type     Joie Schulte, PT Physical Therapist               Outcome Measures     Row Name 02/14/23 1524          How much help from another person do you currently need...    Turning from your back to your side while in flat bed without using bedrails? 4   -HP     Moving from lying on back to sitting on the side of a flat bed without bedrails? 4  -HP     Moving to and from a bed to a chair (including a wheelchair)? 3  -HP     Standing up from a chair using your arms (e.g., wheelchair, bedside chair)? 3  -HP     Climbing 3-5 steps with a railing? 3  -HP     To walk in hospital room? 3  -HP     AM-PAC 6 Clicks Score (PT) 20  -HP     Highest level of mobility 6 --> Walked 10 steps or more  -     Row Name 02/14/23 1524          PADD    Diagnosis 1  -HP     Gender 2  -HP     Age Group 2  -HP     Gait Distance 1  -HP     Assist Level 1  -HP     Home Support 3  -HP     PADD Score 10  -HP     Patient Preference home with outpatient rehab  -HP     Prediction by PADD Score directly home (with home health or out-patient rehab)  -     Row Name 02/14/23 1524          Functional Assessment    Outcome Measure Options AM-PAC 6 Clicks Basic Mobility (PT);PADD  -HP           User Key  (r) = Recorded By, (t) = Taken By, (c) = Cosigned By    Initials Name Provider Type    Joie Johnson PT Physical Therapist                             Physical Therapy Education     Title: PT OT SLP Therapies (Done)     Topic: Physical Therapy (Done)     Point: Mobility training (Done)     Learning Progress Summary           Patient Acceptance, E,D, DU,VU by  at 2/14/2023 1524   Family Acceptance, E,D, DU,VU by  at 2/14/2023 1524                   Point: Home exercise program (Done)     Learning Progress Summary           Patient Acceptance, E,D, DU,VU by  at 2/14/2023 1524   Family Acceptance, E,D, DU,VU by  at 2/14/2023 1524                   Point: Body mechanics (Done)     Learning Progress Summary           Patient Acceptance, E,D, DU,VU by  at 2/14/2023 1524   Family Acceptance, E,D, DU,VU by  at 2/14/2023 1524                   Point: Precautions (Done)     Learning Progress Summary           Patient Acceptance, E,D, DU,VU by  at 2/14/2023 1524   Family Acceptance, E,D, DU,VU  by  at 2/14/2023 1524                               User Key     Initials Effective Dates Name Provider Type Discipline     06/01/21 -  Joie Schulte, PT Physical Therapist PT              PT Recommendation and Plan  Planned Therapy Interventions (PT): balance training, bed mobility training, gait training, home exercise program, patient/family education, transfer training, stretching, strengthening, stair training, ROM (range of motion)  Plan of Care Reviewed With: patient, spouse  Progress: no change  Outcome Evaluation: PT eval complete. Pt performed bed mobility with SBA. Pt performed STS and amb 325' with FWW and CGAx2. Pt navigated 5 steps with CGAx2 and SPC. VC for sequencing. No LOB or knee buckling noted. Activity limited by fatigue. R knee ROM 10-90. HEP and precautions reviewed with pt. Recommend d/c home with assist and OPPT when medically appropriate. Pt cleared to d/c today from PT standpoint.     Time Calculation:    PT Charges     Row Name 02/14/23 1332             Time Calculation    Start Time 1332  -HP      PT Received On 02/14/23  -HP      PT Goal Re-Cert Due Date 02/24/23  -HP         Timed Charges    97050 - PT Therapeutic Exercise Minutes 10  -HP         Untimed Charges    PT Eval/Re-eval Minutes 50  -HP         Total Minutes    Timed Charges Total Minutes 10  -HP      Untimed Charges Total Minutes 50  -HP       Total Minutes 60  -HP            User Key  (r) = Recorded By, (t) = Taken By, (c) = Cosigned By    Initials Name Provider Type     Joie Schulte PT Physical Therapist              Therapy Charges for Today     Code Description Service Date Service Provider Modifiers Qty    70089019464 HC PT THER PROC EA 15 MIN 2/14/2023 Joie Schulte, PT GP 1    02712652984 HC PT EVAL LOW COMPLEXITY 4 2/14/2023 Joie Schulte, PT GP 1    [id number] HC PT THER SUPP EA 15 MIN 2/14/2023 Joie Schulte, PT GP 3          PT G-Codes  Outcome Measure Options: AM-PAC 6 Clicks Basic Mobility (PT),  PADD  AM-PAC 6 Clicks Score (PT): 20  PT Discharge Summary  Anticipated Discharge Disposition (PT): home with assist, home with outpatient therapy services    Joie Schulte, PT  2/14/2023

## 2023-02-14 NOTE — H&P
Patient Name: Buzz Elmore  MRN: [id number]  : 1960  DOS: 2023    Attending: Prince Castellanos MD    Primary Care Provider: Maida Rey MD      Chief complaint:  Right Knee Pain.    Subjective   Patient is a pleasant 62 y.o. male presented for scheduled surgery by .    Per his note ( The patient is a 62 y.o. male with debilitating right knee pain secondary to osteoarthritis that failed to improve in spite of conservative treatment .  Options have been discussed at length with the patient and the patient has had an extended course of conservative treatment without long-term benefit. The patient has reached the point where the patient desires total knee arthroplasty surgery and understands the risks, benefits, and alternatives. Consent was obtained. Please see my office notes for details with regard to preoperative counseling and operative rationale. )    He underwent right total knee arthroplasty, tolerated surgery well, is admitted for further management.  Adductor canal nerve block catheter was placed by acute pain service.    Seen postoperatively, doing very well, good pain control, no complains of nausea, vomiting, or shortness of breath.    Reviewed with patient his past medical history and home medications.  His Effient has been on hold for 5 days.       Allergies   Allergen Reactions   • Atorvastatin Myalgia        Medications Prior to Admission   Medication Sig Dispense Refill Last Dose   • aspirin 81 MG tablet Take 81 mg by mouth Daily.   2023 at 2200   • carvedilol (COREG) 12.5 MG tablet TAKE ONE TABLET BY MOUTH TWICE A DAY WITH MEALS (Patient taking differently: Take 12.5 mg by mouth 2 (Two) Times a Day With Meals.) 60 tablet 11 2023 at 2200   • Chlorhexidine Gluconate 4 % solution Shower daily with solution as directed by provider for 5 days prior to surgery 237 mL 0 2023   • Cholecalciferol (VITAMIN D) 2000 UNITS capsule Take 2,000 Units by mouth  Daily.   2/13/2023 at 0600   • fluticasone (FLONASE) 50 MCG/ACT nasal spray 2 sprays by Each Nare route Daily. (Patient taking differently: 2 sprays by Each Nare route As Needed.) 48 mL 1 Past Week   • meloxicam (MOBIC) 15 MG tablet Take 1 tablet by mouth Daily. 30 tablet 2 Past Week   • pantoprazole (PROTONIX) 40 MG EC tablet TAKE ONE TABLET BY MOUTH DAILY (Patient taking differently: Take 40 mg by mouth Daily.) 90 tablet 1 2/14/2023 at 0500   • rosuvastatin (CRESTOR) 40 MG tablet TAKE ONE TABLET BY MOUTH DAILY (Patient taking differently: Take 40 mg by mouth Every Night.) 90 tablet 3 2/13/2023 at 2200   • telmisartan-hydrochlorothiazide (MICARDIS HCT) 80-25 MG per tablet Take 1 tablet by mouth Daily. 90 tablet 3 2/14/2023 at 0500   • prasugrel (EFFIENT) 10 MG tablet Take 1 tablet by mouth Daily. (Patient taking differently: Take 10 mg by mouth Daily. Instructed to stop 5 days prior to surgery) 90 tablet 3 2/9/2023   • tamsulosin (FLOMAX) 0.4 MG capsule 24 hr capsule Take 1 capsule by mouth Every Night. 30 capsule 0 More than a month   • Tirzepatide (Mounjaro) 2.5 MG/0.5ML solution pen-injector Inject 2.5 mg under the skin into the appropriate area as directed 1 (One) Time Per Week. 2 mL 4 2/10/2023       Past Medical History:   Diagnosis Date   • Arthritis    • Chronic low back pain    • Coronary artery disease    • GERD (gastroesophageal reflux disease)    • Hyperlipidemia    • Hypertension    • IHD (ischemic heart disease)    • Knee swelling    • Obesity    • Tear of meniscus of knee    • Tendinitis of knee      Past Surgical History:   Procedure Laterality Date   • CARDIAC CATHETERIZATION     • COLONOSCOPY     • CORONARY STENT PLACEMENT     • HAND SURGERY      skin graft on finger   • KNEE ARTHROTOMY      History of Arthrotomy Of Knee With Medial Meniscectomy   • KNEE SURGERY Right     meniscus     Family History   Problem Relation Age of Onset   • Breast cancer Other    • Colon cancer Other    • Heart disease  Other    • Breast cancer Mother    • Heart disease Mother    • Hypertension Mother    • Heart attack Mother    • Cancer Mother    • Colon cancer Father    • Hypertension Father    • Heart attack Father    • Diabetes Father    • Heart disease Paternal Grandfather    • Heart disease Brother      Social History     Tobacco Use   • Smoking status: Never   • Smokeless tobacco: Never   Vaping Use   • Vaping Use: Never used   Substance Use Topics   • Alcohol use: Yes     Comment: occasional   • Drug use: No       Review of Systems  Pertinent items are noted in HPI, all other systems reviewed and negative    Vital Signs  /95 (BP Location: Right arm, Patient Position: Lying)   Pulse 84   Temp 97.1 °F (36.2 °C) (Temporal)   Resp 18   SpO2 98%     Physical Exam:    General Appearance:    Alert, cooperative, in no acute distress   Head:    Normocephalic, without obvious abnormality, atraumatic   Eyes:            Lids and lashes normal, conjunctivae and sclerae normal, no   icterus, no pallor, corneas clear    Ears:    Ears appear intact with no abnormalities noted   Throat:   No oral lesions, no thrush, oral mucosa moist   Neck:   No adenopathy, supple, trachea midline, no thyromegaly         Lungs:     Clear to auscultation,respirations regular, even and                   unlabored    Heart:    Regular rhythm and normal rate, normal S1 and S2, no       murmur, no gallop   Abdomen:     Normal bowel sounds, no masses, no organomegaly, soft        non-tender, non-distended, no guarding, no rebound                 tenderness   Genitalia:    Deferred   Extremities:  Right LE, CDI dressing on knee, PNB cath present.    Pulses:   Pulses palpable and equal bilaterally   Skin:   No bleeding, bruising or rash   Neurologic:   Cranial nerves 2 - 12 grossly intact, intact flexion dorsiflexion bilateral feet      I reviewed the patient's new clinical results.       Results from last 7 days   Lab Units 02/07/23  1540   WBC  10*3/mm3 10.15   HEMOGLOBIN g/dL 15.3   HEMATOCRIT % 46.2   PLATELETS 10*3/mm3 185     Results from last 7 days   Lab Units 02/07/23  1540   SODIUM mmol/L 141   POTASSIUM mmol/L 4.1   CHLORIDE mmol/L 105   CO2 mmol/L 24.0   BUN mg/dL 18   CREATININE mg/dL 0.90   CALCIUM mg/dL 9.8   GLUCOSE mg/dL 77     Lab Results   Component Value Date    HGBA1C 6.00 (H) 02/07/2023           Assessment and Plan:       S/P total knee replacement, right    Type 2 diabetes mellitus without complication, without long-term current use of insulin (HCC)    Hyperlipidemia    Hypertension    Obesity    GERD (gastroesophageal reflux disease)    Degenerative arthritis of right knee      Plan  1. PT/OT,  Weight bearing as tolerated right LE  2. Pain control-prns, ACB cath with ropivacaine infusion.  3. IS-encourage  4. DVT proph- Mechanicals and resume dual antiplatelet therapy postop day 1.  5. Bowel regimen  6. Resume home medications as appropriate  7. DC planning for home    Patient is very motivated to work with physical therapy and achieve  mobility and pain control among other goals for possible discharge home later in the day.    We reviewed these goals and discussed with patient tracking  progress for the next few hours and if all is achieved to receive next antibiotic prophylactic dose and be discharged home.     We discussed medications and precriptions at time of discharge including DVT prophylaxis, pain control, and bowel regimen.  All questions were answered .    Patient expressed understanding and agreement.      Dragon disclaimer:  Part of this encounter note is an electronic transcription/translation of spoken language to printed text. The electronic translation of spoken language may permit erroneous, or at times, nonsensical words or phrases to be inadvertently transcribed; Although I have reviewed the note for such errors, some may still exist.    Fani Monteiro MD  02/14/23  12:15 EST

## 2023-02-14 NOTE — ANESTHESIA POSTPROCEDURE EVALUATION
Patient: Buzz Elmore    Procedure Summary     Date: 02/14/23 Room / Location:  [facility]    Anesthesia Start: 1006 Anesthesia Stop: 1220    Procedure: TOTAL KNEE ARTHROPLASTY WITH CORI ROBOT - RIGHT (Right: Knee) Diagnosis:       Primary osteoarthritis of right knee      (Primary osteoarthritis of right knee [M17.11])    Surgeons: Prince Castellanos MD Provider: Escobar Coley MD    Anesthesia Type: general with block ASA Status: 3          Anesthesia Type: general with block    Vitals  Vitals Value Taken Time   BP     Temp     Pulse 70 02/14/23 1219   Resp     SpO2 92 % 02/14/23 1219   Vitals shown include unvalidated device data.        Post Anesthesia Care and Evaluation    Patient location during evaluation: PACU  Patient participation: complete - patient participated  Level of consciousness: awake and alert  Pain score: 0  Pain management: adequate    Airway patency: patent  Anesthetic complications: No anesthetic complications  PONV Status: none  Cardiovascular status: hemodynamically stable and acceptable  Respiratory status: nonlabored ventilation, acceptable and nasal cannula  Hydration status: acceptable

## 2023-02-14 NOTE — PLAN OF CARE
Goal Outcome Evaluation:  Plan of Care Reviewed With: patient, spouse        Progress: no change  Outcome Evaluation: PT eval complete. Pt performed bed mobility with SBA. Pt performed STS and amb 325' with FWW and CGAx2. Pt navigated 5 steps with CGAx2 and SPC. VC for sequencing. No LOB or knee buckling noted. Activity limited by fatigue. R knee ROM 10-90. HEP and precautions reviewed with pt. Recommend d/c home with assist and OPPT when medically appropriate. Pt cleared to d/c today from PT standpoint.

## 2023-02-14 NOTE — ANESTHESIA PROCEDURE NOTES
Adductor canal Cath      Patient reassessed immediately prior to procedure    Reason for block: at surgeon's request and post-op pain management  Performed by  CRNA/CAA: Bob Perez CRNASRNA: Ophelia Pollard SRNA  Preanesthetic Checklist  Completed: patient identified, IV checked, site marked, risks and benefits discussed, surgical consent, monitors and equipment checked, pre-op evaluation and timeout performed  Prep:  Pt Position: supine  Sterile barriers:cap, gloves, mask, sterile barriers and washed/disinfected hands  Prep: ChloraPrep  Patient monitoring: blood pressure monitoring, continuous pulse oximetry and EKG  Procedure  Performed under: spinal  Guidance:ultrasound guided    ULTRASOUND INTERPRETATION.  Using ultrasound guidance a 20 G gauge needle was placed in close proximity to the nerve, at which point, under ultrasound guidance anesthetic was injected in the area of the nerve and spread of the anesthesia was seen on ultrasound in close proximity thereto.  There were no abnormalities seen on ultrasound; a digital image was taken; and the patient tolerated the procedure with no complications. Images:still images obtained, printed/placed on chart    Block Type:adductor canal block  Injection Technique:catheter  Needle Type:Tuohy and echogenic  Needle Gauge:18 G  Resistance on Injection: none  Catheter Size:20 G (20g)  Cath Depth at skin: 10 cm    Medications Used: bupivacaine PF (MARCAINE) 0.25 % injection - Injection   20 mL - 2/14/2023 12:01:00 PM      Post Assessment  Injection Assessment: negative aspiration for heme, incremental injection and no paresthesia on injection  Patient Tolerance:comfortable throughout block  Complications:no  Additional Notes  CATHETER   A high-frequency linear transducer, with sterile cover, was placed on the anterior mid-thigh (between the anterior superior iliac spine and patella). The transducer was then moved medially to identify the Sartorius muscle (Patrice), Vastus  "Medialis muscle (VMM), Superficial Femoral Artery (SFA) and Vein. The transducer was then moved cephalad or caudad to position the SFA in the middle of the Patrice. The insertion site was prepped and draped in sterile fashion. Skin and cutaneous tissue was infiltrated with 2-5 ml of 1% Lidocaine. Using ultrasound-guidance, an 18-gauge Contiplex Ultra 360 Touhy needle was advanced in plane from lateral to medial. Preservative-free normal saline was utilized for hydro-dissection of tissue, advancement of Touhy, and to confirm needle placement below the fascial plane of the Patrice where the Nerve to the VMM is located. Local anesthetic (LA) 5 ml deposited here. The Touhy needle continues its path lateral to the SFA at the level of the Saphenous Nerve. The remainder of the LA was deposited at the 10-11 o'clock position of the SFA. This injection created a space between the Patrice and the SFA. Aspiration every 5 ml to prevent intravascular injection. Injection was completed with negative aspiration of blood and negative intravascular injection. Injection pressures were normal with minimal resistance. A 20-gauge EUDOWEBiplex Echo catheter was placed through the needle and advance out the tip of the Touhy 3-5 cm anterior to the SFA. The Touhy needle was then removed, and final catheter position verified at the 12 o'clock position to the SFA. The catheter was secured in the usual fashion with skin glue, benzoin, steri-strips, CHG tegaderm and label noting \"Nerve Block Catheter\". Jerk tape applied at yellow connector and catheter connection.           "

## 2023-02-14 NOTE — CASE MANAGEMENT/SOCIAL WORK
Continued Stay Note  [facility]     Patient Name: Buzz Elmore  MRN: [id number]  Today's Date: 2/14/2023    Admit Date: 2/14/2023    Plan: home with [facility] transitions program   Discharge Plan     Row Name 02/14/23 3546       Plan    Plan home with [facility] transitions program    Patient/Family in Agreement with Plan yes    Plan Comments Pt lives in [place] with his wife. He was independent with ADLs and denies use of any DME. He is followed by his PCP and has drug coverage. His plan at this time is to return home with the [facility] transitions program. Jessi has the referral. A rolligng walker will be brought to his room prior to discharge.    Final Discharge Disposition Code 01 - home or self-care               Discharge Codes    No documentation.                     Gita Martinez RN

## 2023-02-14 NOTE — OP NOTE
DATE OF PROCEDURE: 02/14/23    PREOPERATIVE DIAGNOSIS: right knee arthritis      POSTOPERATIVE DIAGNOSIS:  right knee arthritis    PROCEDURES PERFORMED:   right total knee arthroplasty with [employer] components (#7 cruciate retaining femur, #6 tibia, 11 mm polyethylene, with 35 three peg patella) with CORI robotic assistance     SURGEON: Prince Castellanos MD    ASSISTANT: Stacey Interiano PA-C  (Stacey Interiano PA-C was present and necessary for positioning, draping, retraction, instrumentation and closure.)    SPECIMENS: None    IMPLANTS:   Implant Name Type Inv. Item Serial No.  Lot No. LRB No. Used Action   CMT BONE PALACOS R HI/VISC 1X40 - [id number] Implant CMT BONE PALACOS R HI/VISC 1X40  [employer] [id number] Right 1 Implanted   CMT BONE PALACOS R HI/VISC 1X40 - [id number] Implant CMT BONE PALACOS R HI/VISC 1X40  [employer] [id number] Right 1 Implanted   DEV CONTRL TISS STRATAFIX SPIRAL MNCRYL UD 3/0 PLS 60CM - [id number] Implant DEV CONTRL TISS STRATAFIX SPIRAL MNCRYL UD 3/0 PLS 60CM  [employer]  DIV [employer] [id number] Right 1 Implanted   DEV CONTRL TISS STRATAFIX SYMM PDS PLUS LAURA CT-1 45CM - [id number] Implant DEV CONTRL TISS STRATAFIX SYMM PDS PLUS LAURA CT-1 45CM  [employer]  DIV [employer] J [employer] J [id number] Right 1 Implanted   COMP FEM LEGION OXINIUM CR SZ7 RT - [id number] Implant COMP FEM LEGION OXINIUM CR SZ7 RT  [employer] [id number] Right 1 Implanted   BASE TIB/KN GEN2 NONPOR TI SZ6 RT - [id number] Implant BASE TIB/KN GEN2 NONPOR TI SZ6 RT  [employer] [id number] Right 1 Implanted   PAT GEN2 RESRF 35MM - [id number] Implant PAT GEN2 RESRF 35MM  [employer] [id number] Right 1 Implanted   INSRT ART/KN LEGION CR HF XLPE SZ5TO6 11MM - [id number] Implant INSRT ART/KN LEGION CR HF XLPE SZ5TO6 11MM  [employer] [id number] Right 1 Implanted         ANESTHESIA:  Spinal    STAFF:  Circulator: Mi Mak RN  Scrub Person: Brown Childs  Vendor Representative:  Justice Hernandez ([employer])  Nursing Assistant: Donny Olivera, PCT; Ranjana Johnson PCT  Assistant: Stacey nIteriano PA-C    TOURNIQUET TIME: 14 minutes    ESTIMATED BLOOD LOSS: 100ml     COMPLICATIONS: None    PREOPERATIVE ANTIBIOTICS: Ancef 2 g    INDICATIONS: The patient is a 62 y.o. male with debilitating right knee pain secondary to osteoarthritis that failed to improve in spite of conservative treatment .  Options have been discussed at length with the patient and the patient has had an extended course of conservative treatment without long-term benefit. The patient has reached the point where the patient desires total knee arthroplasty surgery and understands the risks, benefits, and alternatives. Consent was obtained. Please see my office notes for details with regard to preoperative counseling and operative rationale.     DESCRIPTION OF PROCEDURE: The patient was positively identified in the preoperative holding area and brought to the operating suite and placed in a supine position. After adequate general with peripheral nerve block anesthetic had been achieved (the patient was not off of his Effient for 7 days preoperatively), the right lower extremity was prepped and draped in the usual sterile fashion.  After application of a tourniquet to the right upper thigh, which was used during the procedure for a total 14 minutes during the cementation process only. Landmarks of the knee were identified and timeout procedure was performed to confirm the operative site, as well as other parameters. Following the sterile prep and drape, a skin incision was made just off the medial aspect of midline for a medial parapatellar approach. Following a sharp skin incision, dissection was carried down to the level of the extensor mechanism and a medial parapatellar arthrotomy was made and the patella was tucked into the lateral gutter.  Anterior horns of the medial and lateral menisci were removed, and ACL  transected.  Osteophytes were also removed.  Description of arthritis: Bone-on-bone contact of the medial compartment, tricompartmental osteophytes, varus alignment.      [employer] robotic system was then employed to assist with preparation of the femoral and tibial bone surfaces.  Femoral and tibial arrays were placed, as well as markers in both the femur and tibia.  System was registered in a systematic fashion, and 3D models created of both the femoral and tibial aspects.  Range of motion and gap assessments were also performed, and implants were selected and appropriately sized and oriented both the femur and tibial aspects starting with the flexion gap, and then progressing to the extension gap.  Planning was made for a 7 cruciate retaining femoral component and a 7 tibial component with a 9 mm insert.  Once balancing had been achieved, distal femoral surface was then prepared with the CORI kelly, followed by preparation for the 4-in-1 cutting block.  Proximal tibial cut was then performed in a guided fashion, posterior condyles were freed of osteophytes, and trial implants placed, namely a 7 cruciate retaining femur with a 6 tibia and a 10 mm trial insert.  Range of motion and gap assessments were again performed, and excellent balancing was noted.  The patella was then prepared for a 35 three peg patella which had excellent tracking.      Therefore the trial components were removed, and preparations made for final placement, and final components were cemented in place with namely a #6 tibia, #7 cruciate retaining femur, and a 35 three peg patella with a trial 11 mm insert for cement compression. All the excess cement was removed from the bone implant interface and allowed to harden. Tourniquet was deflated. Hemostasis was obtained with electrocautery. There was no brisk bleeding noted in the popliteal fossa in particular. Therefore, the knee was copiously irrigated as it was between major steps, and the final 11 mm  insert was placed as this was deemed appropriate for the patient's anatomy with full flexion and extension and no instability and attention was then directed towards closure. The medial parapatellar arthrotomy was closed with #1 Vicryl in an interrupted figure-of-eight fashion in 4 strategic locations followed by oversewing this from proximal to distal with a #1 StrataFix symmetric, which nicely sealed the joint, followed by closure of the deep fascial layer with #1 Vicryl in a buried interrupted fashion, followed by closure of the subcutaneous layer with 2-0 Vicryl and the skin with 3-0 Stratafix in a running subcuticular fashion.  Adhesive wound closure dressing was applied followed by a sterile dressing with 4 x 4's, abdominal pad, soft roll and Ace wrap. The patient tolerated the procedure well and was brought to the recovery room in good condition.     PLAN:  1.  The patient will begin early range of motion and weight-bearing per the post total knee arthroplasty protocol.   2.  I anticipate brief hospitalization for initial rehabilitation and pain control followed by continued rehabilitation home health/outpatient physical therapy setting.  Patient may be ready for discharge later today if he is cleared medically and by therapy.  Follow-up in 3 weeks as planned.   3.  Postoperative medical management with Dr. Monteiro.  4.  Effient will be utilized for DVT prophylaxis (patient was on Effient preoperatively).       Prince Castellanos MD  02/14/23  11:43 EST

## 2023-02-15 ENCOUNTER — TELEPHONE (OUTPATIENT)
Dept: ORTHOPEDIC SURGERY | Facility: CLINIC | Age: 63
End: 2023-02-15
Payer: COMMERCIAL

## 2023-02-15 DIAGNOSIS — Z96.651 S/P TOTAL KNEE REPLACEMENT, RIGHT: ICD-10-CM

## 2023-02-15 RX ORDER — POLYETHYLENE GLYCOL 3350 17 G/17G
17 POWDER, FOR SOLUTION ORAL DAILY
Qty: 15 PACKET | Refills: 0 | Status: SHIPPED | OUTPATIENT
Start: 2023-02-15 | End: 2023-03-02

## 2023-02-15 RX ORDER — OXYCODONE HYDROCHLORIDE 5 MG/1
5 TABLET ORAL EVERY 4 HOURS PRN
Qty: 40 TABLET | Refills: 0 | Status: SHIPPED | OUTPATIENT
Start: 2023-02-15

## 2023-02-15 NOTE — TELEPHONE ENCOUNTER
Sent patient a message in [employer] letting him know this was sent to the pharmacy and he can remove the ace wrap 2 days after surgery.  Melisa Latham RT (R), ROT

## 2023-02-15 NOTE — PROGRESS NOTES
LEANDRO Ruffin    Nerve Cath Post Op Call    Patient Name: Buzz Elmore  :  1960  MRN:  [id number]  Date of Discharge: 2023    Nerve Cath Post Op Call:    Catheter Plan:See [employer] note for call or text communication  Patient/Family instructed to call ON CALL anesthesia provider for any questions or problems.  Patient Follow Up:

## 2023-02-15 NOTE — TELEPHONE ENCOUNTER
Dr. Castellanos,    Patient is unable to get scripts from [facility] in [place] (I think they are having some problems). He would like the Roxicodone and Miralax sent to the [facility] in [place] instead. I changed the pharmacy in the system. Please advise thank you!  Melisa BONNER (R), ROT

## 2023-02-15 NOTE — TELEPHONE ENCOUNTER
Patient had surgery yesterday and the [facility] in [place] is down... patient is requesting the 2 prescribed medications be sent to [facility]...    Also, wanting to know when the ace bandage can be removed?

## 2023-02-16 NOTE — ADDENDUM NOTE
Addendum  created 02/16/23 144 by Escobar De Leon CRNA    Clinical Note Signed, Diagnosis association updated, Intraprocedure Blocks edited

## 2023-02-18 ENCOUNTER — NURSE TRIAGE (OUTPATIENT)
Dept: CALL CENTER | Facility: HOSPITAL | Age: 63
End: 2023-02-18
Payer: COMMERCIAL

## 2023-02-18 NOTE — TELEPHONE ENCOUNTER
He was discharged from [facility] on 02/14/23- He had a total knee to the right. It is swollen and it is light pink.If any time the area looks infected or draining and he runs a fever call surgeon.     Reason for Disposition  • [1] Caller has NON-URGENT question AND [2] triager unable to answer question    Additional Information  • Negative: [1] Major abdominal surgical incision AND [2] wound gaping open AND [3] visible internal organs  • Negative: Sounds like a life-threatening emergency to the triager  • Negative: Patient has a Negative Pressure Wound Therapy device  • Negative: Patient is followed by a wound clinic or wound specialist for this wound  • Negative: [1] Bleeding from incision AND [2] won't stop after 10 minutes of direct pressure  • Negative: [1] Widespread rash AND [2] bright red, sunburn-like  • Negative: Severe pain in the incision  • Negative: [1] Incision gaping open AND [2] < 48 hours since wound re-opened  • Negative: [1] Incision gaping open AND [2] length of opening > 2 inches (5 cm)  • Negative: Patient sounds very sick or weak to the triager  • Negative: Sounds like a serious complication to the triager  • Negative: Fever > 100.4 F (38.0 C)  • Negative: [1] Incision looks infected (spreading redness, pain) AND [2] fever > 99.5 F (37.5 C)  • Negative: [1] Incision looks infected (spreading redness, pain) AND [2] large red area (> 2 in. or 5 cm)  • Negative: [1] Incision looks infected (spreading redness, pain) AND [2] face wound  • Negative: [1] Red streak runs from the incision AND [2] longer than 1 inch (2.5 cm)  • Negative: [1] Pus or bad-smelling fluid draining from incision AND [2] no fever  • Negative: [1] Post-op pain AND [2] not controlled with pain medications  • Negative: Dressing soaked with blood or body fluid (e.g., drainage)  • Negative: [1] Raised bruise and [2] size > 2 inches (5 cm) and expanding  • Negative: [1] Caller has URGENT question AND [2] triager unable to answer  "question  • Negative: [1] INCREASING pain in incision AND [2] > 2 days (48 hours) since surgery  • Negative: [1] Small red area or streak AND [2] no fever  • Negative: [1] Clear or blood-tinged fluid draining from wound AND [2] no fever  • Negative: [1] Incision gaping open AND [2] > 48 hours since wound re-opened AND [3] length of opening > 1/2 inch (12 mm)  • Negative: [1] Incision on face gaping open after skin glue AND [2] > 48 hours since wound re-opened AND [3] length of opening > 1/4 inch (6 mm)  • Negative: Suture or staple removal is overdue  • Negative: [1] Suture or staple came out early AND [2] caller wants wound checked  • Negative: Pimple where a stitch comes through the skin  • Negative: Suture (or staple) came out early  • Negative: Mild bruising near incision site  • Negative: Suture removal date, questions about  • Negative: Skin tape (e.g., Steri-strips) removal, questions about  • Negative: Sutured or stapled surgical incision, questions about  • Negative: Surgical incision after sutures or staples removed, questions about  • Negative: Numbness at incision site, questions about    Answer Assessment - Initial Assessment Questions  1. SYMPTOM: \"What's the main symptom you're concerned about?\" (e.g., redness, pain, drainage)     Redness to the site.   2. ONSET: \"When did *No Answer*  start?\"      Noted light pink today   3. SURGERY: \"What surgery was performed?\"    Total right knee   4. DATE of SURGERY: \"When was surgery performed?\"       02/14/2023  5. INCISION SITE: \"Where is the incision located?\"       Knee tight   6. REDNESS: \"Is there any redness at the incision site?\" If yes, ask: \"How wide across is the redness?\" (Inches, centimeters)       Light red to the side   7. PAIN: \"Is there any pain?\" If Yes, ask: \"How bad is it?\"  (Scale 1-10; or mild, moderate, severe)      5   8. BLEEDING: \"Is there any bleeding?\" If Yes, ask: \"How much?\" and \"Where?\"      none  9. DRAINAGE: \"Is there any " "drainage from the incision site?\" If yes, ask: \"What color and how much?\" (e.g., red, cloudy, pus; drops, teaspoon)      none  10. FEVER: \"Do you have a fever?\" If Yes, ask: \"What is your temperature, how was it measured, and when did it start?\"        none  11. OTHER SYMPTOMS: \"Do you have any other symptoms?\" (e.g., shaking chills, weakness, rash elsewhere on body)        no    Protocols used: POST-OP INCISION SYMPTOMS AND QUESTIONS-ADULT-      " 5

## 2023-02-22 ENCOUNTER — TELEPHONE (OUTPATIENT)
Dept: ORTHOPEDIC SURGERY | Facility: CLINIC | Age: 63
End: 2023-02-22
Payer: COMMERCIAL

## 2023-02-22 NOTE — TELEPHONE ENCOUNTER
We cannot prescribe anything for sleep.  He may try melatonin, benadryl or an OTC sleep aid.  More than that, he should contact his PCP

## 2023-02-22 NOTE — TELEPHONE ENCOUNTER
HUB OK TO READ:    Left voicemail letting patient know we cannot prescribe anything for sleep.  We can recommend over the counter sleep aid (melantonin, benadryl, etc...) but anything prescription wise would need to come from his PCP.    Thank you,    Feliz BONNER(R)

## 2023-02-22 NOTE — TELEPHONE ENCOUNTER
PATIENT'S WIFE CALLED IN WANTING TO KNOW IF IT WAS POSSIBLE FOR THE PATIENT TO GET SOMETHING TO HELP HIM SLEEP. HE HASN'T GOT A GOOD NIGHT SLEEP SINCE HIS SURGERY, DUE TO DISCOMFORT. HE IS NOT IN PAIN, JUST UNCOMFORTABLE.     IF DR. ALBARRAN OR CLINICAL STAFF COULD PLEASE ADVISE.     BEST CALL BACK # [phone]

## 2023-03-06 ENCOUNTER — OFFICE VISIT (OUTPATIENT)
Dept: ORTHOPEDIC SURGERY | Facility: CLINIC | Age: 63
End: 2023-03-06
Payer: COMMERCIAL

## 2023-03-06 ENCOUNTER — TELEPHONE (OUTPATIENT)
Dept: ORTHOPEDIC SURGERY | Facility: CLINIC | Age: 63
End: 2023-03-06

## 2023-03-06 VITALS — TEMPERATURE: 97.1 F

## 2023-03-06 DIAGNOSIS — Z96.651 S/P TOTAL KNEE REPLACEMENT, RIGHT: Primary | ICD-10-CM

## 2023-03-06 PROCEDURE — 99024 POSTOP FOLLOW-UP VISIT: CPT | Performed by: PHYSICIAN ASSISTANT

## 2023-03-06 RX ORDER — MELOXICAM 15 MG/1
TABLET ORAL
Qty: 30 TABLET | Refills: 2 | Status: SHIPPED | OUTPATIENT
Start: 2023-03-06

## 2023-03-06 RX ORDER — ACETAMINOPHEN 325 MG/1
650 TABLET ORAL EVERY 6 HOURS PRN
COMMUNITY

## 2023-03-06 NOTE — PROGRESS NOTES
[facility] [place]        Subjective     Post-op (3 week s/p Total Knee Arthroplasty With Cori Robot - Right  2/14/23)       HPI    Buzz Elmore is a 62 y.o. male.  Patient presents for their initial postop visit following right TKA performed on the above date by Dr. Castellanos.    Pain scale: 3-4/10.  Associated symptoms stiffness.  Pain eases with rest, ice and medication.      Patient states he only takes his oxycodone at night.  He is working with outpatient PT.  He is requesting a refill of his meloxicam.    Patient denies any fever, chills, night sweats or other constitutional symptoms.          Objective      Physical Exam  Temp 97.1 °F (36.2 °C)     There is no height or weight on file to calculate BMI.        Ortho Exam  Integument:   Right knee: Incision is healing well without redness, warmth, drainage or evidence of infection.    Lower Extremities:   Right knee:    Tenderness:  None    Effusion:  Trace    Swelling: None    Crepitus:  None    Range of motion:  Extension: 0°       Flexion: 100°  Instability:  No varus laxity, no valgus laxity, negative anterior drawer  Deformities:  None      Imaging Reviewed:  Ordered right knee plain films.  Imaging read/interpreted by Dr. Nava.    Indication: Status post right total knee arthroplasty     Views: Weightbearing views of the knee are submitted.      Impression: Evidence of hardware from previous total knee arthroplasty.  No evidence of loosening or failure.  No acute findings.     Comparison: Unchanged compared to similar images from 2/14/2023.       Assessment:  1. S/P total knee replacement, right        Plan:  1. Status post right TKA, stable.  2. Reviewed imaging with the patient.  3. Continue with formal outpatient PT.  4. Prescribed meloxicam.  5. Follow up with Dr. Castellanos in 6 weeks.  No imaging required.  6. Questions and concerns answered.      Merary Dave PA-C  03/06/23  09:34 EST      Dictated Utilizing Dragon  Dictation.

## 2023-03-06 NOTE — TELEPHONE ENCOUNTER
Left voicemail to get info for return to work light duty. When pt calls back, ask for Lindsay VERGARA in clinic.

## 2023-03-07 ENCOUNTER — TELEPHONE (OUTPATIENT)
Dept: ORTHOPEDIC SURGERY | Facility: CLINIC | Age: 63
End: 2023-03-07
Payer: COMMERCIAL

## 2023-03-07 NOTE — TELEPHONE ENCOUNTER
Patient's wife called and wanted to know if patient could return to work/work status. Her number is [phone]

## 2023-03-07 NOTE — TELEPHONE ENCOUNTER
Can someone please type up a RTW note for this patient to say he may return to work on 3/8/23 with restrictions of light duty and no  until reevaluated on 4/17/23. Please release to patient via [employer].  Melisa Latham RT (R), ROT

## 2023-03-07 NOTE — TELEPHONE ENCOUNTER
Called, left voicemail letting patient and his wife know that he can RTW on light duty no  until reevaluated on 4/17/23 by Dr. Castellanos. I also sent patient a message in [employer] letting them know this and will have someone from the front office type this up and release it to [employer] for them.  Melisa Latham RT (R), ROT

## 2023-03-07 NOTE — TELEPHONE ENCOUNTER
Spoke with patient's wife, Gabrielle. She asked that letter be emailed and mailed to home address on file and email address: [email or web address]    Feliz, Could you email and mail a copy for this patient since I will be out of the office tomorrow? Its in the letter tab, created today for yesterday's office visit. Thank you, Lindsay VERGARA

## 2023-03-08 NOTE — TELEPHONE ENCOUNTER
Mailed hard copy to patient and copy sent to patient via [employer].  Patient states there was not a need to e-mail.    Feliz [email or web address])

## 2023-03-09 NOTE — TELEPHONE ENCOUNTER
SERG WIFE CALLING BACK STATING THAT SHE SENT IN DISABILITY PAPERS TODAY 03/09/23 AND ASKING TO BE EMAILED TO  [email or web address]

## 2023-03-13 ENCOUNTER — TELEPHONE (OUTPATIENT)
Dept: ORTHOPEDIC SURGERY | Facility: CLINIC | Age: 63
End: 2023-03-13
Payer: COMMERCIAL

## 2023-03-13 NOTE — TELEPHONE ENCOUNTER
PATIENT'S WIFE CALLED AND SAID THAT THE SHORT-TERM DISABILITY PAPERWORK IS NEEDED TO BE SENT IN BY 3/14.

## 2023-03-14 NOTE — TELEPHONE ENCOUNTER
Sent patient a message in [email or web address] letting them know I would complete this today but it will not be signed until tomorrow as Dr. Castellanos is in surgery today.  Melisa Latham RT (R), ROT

## 2023-03-31 RX ORDER — TIRZEPATIDE 2.5 MG/.5ML
INJECTION, SOLUTION SUBCUTANEOUS
Qty: 2 ML | Refills: 3 | Status: SHIPPED | OUTPATIENT
Start: 2023-03-31

## 2023-04-12 ENCOUNTER — OFFICE VISIT (OUTPATIENT)
Dept: ORTHOPEDIC SURGERY | Facility: CLINIC | Age: 63
End: 2023-04-12
Payer: COMMERCIAL

## 2023-04-12 DIAGNOSIS — Z47.89 ORTHOPEDIC AFTERCARE: ICD-10-CM

## 2023-04-12 DIAGNOSIS — Z96.651 S/P TOTAL KNEE REPLACEMENT, RIGHT: Primary | ICD-10-CM

## 2023-04-12 PROCEDURE — 99024 POSTOP FOLLOW-UP VISIT: CPT | Performed by: ORTHOPAEDIC SURGERY

## 2023-04-12 ASSESSMENT — KOOS JR
KOOS JR SCORE: 10
KOOS JR SCORE: 61.583

## 2023-04-12 NOTE — PROGRESS NOTES
[facility] [place] [facility] Clinic Note    Subjective     Chief Complaint   Patient presents with   • Post-op Follow-up     4.5 week follow up - 2 months S/P right total knee arthroplasty (2/14/23)        HPI    It has been 4  week(s) since Mr. Elmore's last visit. He returns to clinic today for postoperative follow-up of right knee arthroplasty. The issue has been ongoing for 10 year(s). He rates his pain a 2/10 on the pain scale. Previous/current treatments: NSAIDS, physical therapy and weight loss. Current symptoms: swelling and popping. The pain is worse with sleeping, rising from seated position and Climbing ladders; ice improve the pain. Overall, he is doing better.  75% improvement compared to his preoperative symptoms.  Fully ambulatory without external aids.  He would like to return to regular work in about a week and a half.  He is doing light duty currently.    I have reviewed the following portions of the patient's history and agree with: History of Present Illness and Review of Systems    Patient Active Problem List   Diagnosis   • Coagulation disorder   • Benign prostatic hyperplasia with urinary obstruction   • Chest pain   • Mechanical complication due to tissue graft   • Atherosclerosis of coronary artery   • Type 2 diabetes mellitus without complication, without long-term current use of insulin   • Gastroesophageal reflux disease   • Hyperlipidemia   • Hypertension   • Knee pain   • Lipoma   • Low back pain   • Calculus of kidney   • Muscle pain   • Vitamin D deficiency   • IHD (ischemic heart disease)   • Obesity   • GERD (gastroesophageal reflux disease)   • Chronic low back pain   • Acute serous otitis media   • Mixed conductive and sensorineural hearing loss   • Bilateral sensorineural hearing loss   • Otitis media of left ear   • Left upper quadrant abdominal mass   • Abdominal wall cellulitis   • Colon polyps   • Degenerative arthritis of right knee   • S/P total knee replacement, right      Past Medical History:   Diagnosis Date   • Arthritis    • Chronic low back pain    • Coronary artery disease    • GERD (gastroesophageal reflux disease)    • Hyperlipidemia    • Hypertension    • IHD (ischemic heart disease)    • Knee swelling    • Obesity    • Tear of meniscus of knee    • Tendinitis of knee       Past Surgical History:   Procedure Laterality Date   • CARDIAC CATHETERIZATION     • COLONOSCOPY     • CORONARY STENT PLACEMENT     • HAND SURGERY      skin graft on finger   • KNEE ARTHROTOMY      History of Arthrotomy Of Knee With Medial Meniscectomy   • KNEE SURGERY Right     meniscus   • TOTAL KNEE ARTHROPLASTY Right 02/14/2023    Procedure: TOTAL KNEE ARTHROPLASTY WITH CORI ROBOT - RIGHT;  Surgeon: Prince Castellanos MD;  [facility];  Service: Robotics - Ortho;  Laterality: Right;      Family History   Problem Relation Age of Onset   • Breast cancer Other    • Colon cancer Other    • Heart disease Other    • Breast cancer Mother    • Heart disease Mother    • Hypertension Mother    • Heart attack Mother    • Cancer Mother    • Colon cancer Father    • Hypertension Father    • Heart attack Father    • Diabetes Father    • Heart disease Paternal Grandfather    • Heart disease Brother      Social History     Socioeconomic History   • Marital status:    Tobacco Use   • Smoking status: Never   • Smokeless tobacco: Never   Vaping Use   • Vaping Use: Never used   Substance and Sexual Activity   • Alcohol use: Yes     Comment: occasional   • Drug use: No   • Sexual activity: Yes     Partners: Female     Birth control/protection: Natural family planning/Rhythm      Current Outpatient Medications on File Prior to Visit   Medication Sig Dispense Refill   • acetaminophen (TYLENOL) 325 MG tablet Take 2 tablets by mouth Every 6 (Six) Hours As Needed for Mild Pain.     • aspirin 81 MG tablet Take 1 tablet by mouth Daily.     • carvedilol (COREG) 12.5 MG tablet TAKE ONE TABLET BY MOUTH TWICE A DAY  WITH MEALS (Patient taking differently: Take 1 tablet by mouth 2 (Two) Times a Day With Meals.) 60 tablet 11   • Cholecalciferol (VITAMIN D) 2000 UNITS capsule Take 1 capsule by mouth Daily.     • fluticasone (FLONASE) 50 MCG/ACT nasal spray 2 sprays by Each Nare route Daily. 48 mL 1   • meloxicam (MOBIC) 15 MG tablet TAKE ONE TABLET BY MOUTH DAILY 30 tablet 2   • Mounjaro 2.5 MG/0.5ML solution pen-injector INJECT 2.5 MG UNDER THE SKIN ONCE WEEKLY 2 mL 3   • pantoprazole (PROTONIX) 40 MG EC tablet TAKE ONE TABLET BY MOUTH DAILY (Patient taking differently: Take 1 tablet by mouth Daily.) 90 tablet 1   • prasugrel (EFFIENT) 10 MG tablet Take 1 tablet by mouth Daily. 90 tablet 3   • ropivacaine (NAROPIN) 0.2 % infusion (INFUSYSTEM) 2 mg/hr by Peripheral Nerve route Continuous.     • rosuvastatin (CRESTOR) 40 MG tablet TAKE ONE TABLET BY MOUTH DAILY (Patient taking differently: Take 1 tablet by mouth Every Night.) 90 tablet 3   • tamsulosin (FLOMAX) 0.4 MG capsule 24 hr capsule Take 1 capsule by mouth Every Night. 30 capsule 0   • telmisartan-hydrochlorothiazide (MICARDIS HCT) 80-25 MG per tablet Take 1 tablet by mouth Daily. 90 tablet 3   • [DISCONTINUED] oxyCODONE (Roxicodone) 5 MG immediate release tablet Take 1 tablet by mouth Every 4 (Four) Hours As Needed for Moderate Pain. 40 tablet 0     No current facility-administered medications on file prior to visit.      Allergies   Allergen Reactions   • Atorvastatin Myalgia        Review of Systems   Constitutional: Negative for activity change, appetite change, chills, diaphoresis, fatigue, fever and unexpected weight change.   HENT: Negative for congestion, dental problem, drooling, ear discharge, ear pain, facial swelling, hearing loss, mouth sores, nosebleeds, postnasal drip, rhinorrhea, sinus pressure, sneezing, sore throat, tinnitus, trouble swallowing and voice change.    Eyes: Negative for photophobia, pain, discharge, redness, itching and visual disturbance.    Respiratory: Negative for apnea, cough, choking, chest tightness, shortness of breath, wheezing and stridor.    Cardiovascular: Negative for chest pain, palpitations and leg swelling.   Gastrointestinal: Negative for abdominal distention, abdominal pain, anal bleeding, blood in stool, constipation, diarrhea, nausea, rectal pain and vomiting.   Endocrine: Negative for cold intolerance, heat intolerance, polydipsia, polyphagia and polyuria.   Genitourinary: Negative for decreased urine volume, difficulty urinating, dysuria, enuresis, flank pain, frequency, genital sores, hematuria and urgency.   Musculoskeletal: Positive for arthralgias. Negative for back pain, gait problem, joint swelling, myalgias, neck pain and neck stiffness.   Skin: Negative for color change, pallor, rash and wound.   Allergic/Immunologic: Negative for environmental allergies, food allergies and immunocompromised state.   Neurological: Negative for dizziness, tremors, seizures, syncope, facial asymmetry, speech difficulty, weakness, light-headedness, numbness and headaches.   Hematological: Negative for adenopathy. Does not bruise/bleed easily.   Psychiatric/Behavioral: Negative for agitation, behavioral problems, confusion, decreased concentration, dysphoric mood, hallucinations, self-injury, sleep disturbance and suicidal ideas. The patient is not nervous/anxious and is not hyperactive.         Objective      Physical Exam  There were no vitals taken for this visit.    There is no height or weight on file to calculate BMI.    General:   Mental Status:  Alert   Appearance: Cooperative, in no acute distress   Build and Nutrition: Well-nourished male   Orientation: Alert and oriented to person, place and time   Posture: Normal   Gait: Nonantalgic    Integument:   Right knee: Wound is well-healed with no signs of infection    Lower Extremities:   Right Knee:    Tenderness:  None    Effusion:  None    Swelling: None    Crepitus:  None    Range of  motion:  Extension: 0°       Flexion: 125°  Instability:  No varus laxity, no valgus laxity, negative anterior drawer  Deformities:  None      Imaging/Studies  Imaging Results (Last 24 Hours)     ** No results found for the last 24 hours. **            Assessment and Plan     Diagnoses and all orders for this visit:    1. S/P total knee replacement, right (Primary)    2. Orthopedic aftercare        1. S/P total knee replacement, right    2. Orthopedic aftercare        I reviewed my findings with the patient.  His right total knee arthroplasty is functioning well.  I will see him back in 10 months, which will be a 1 year checkup with x-rays.  I will see him back sooner for any problems.  We will provide a work note to return to regular work on 4/24/2023.  I will be happy to see him back for his left knee if he has a flareup in the future.  He is also thinking about surgery later this year, and I will see him back at least a few months beforehand to schedule if he desires.    Return in about 10 months (around 2/12/2024) for Recheck with X-Rays.      Prince Castellanos MD  04/12/23  13:50 EDT

## 2023-05-08 ENCOUNTER — OFFICE VISIT (OUTPATIENT)
Dept: CARDIOLOGY | Facility: CLINIC | Age: 63
End: 2023-05-08
Payer: COMMERCIAL

## 2023-05-08 VITALS
HEART RATE: 81 BPM | SYSTOLIC BLOOD PRESSURE: 134 MMHG | DIASTOLIC BLOOD PRESSURE: 76 MMHG | BODY MASS INDEX: 33.8 KG/M2 | OXYGEN SATURATION: 97 % | WEIGHT: 255 LBS | HEIGHT: 73 IN

## 2023-05-08 DIAGNOSIS — E78.2 MIXED HYPERLIPIDEMIA: ICD-10-CM

## 2023-05-08 DIAGNOSIS — I25.9 IHD (ISCHEMIC HEART DISEASE): Primary | ICD-10-CM

## 2023-05-08 DIAGNOSIS — I10 ESSENTIAL HYPERTENSION: ICD-10-CM

## 2023-05-08 NOTE — PROGRESS NOTES
[facility]  Subjective:     Encounter Date: 05/08/2023      Patient ID: Buzz Elmore is a 62 y.o. male.    Chief Complaint: ischemic heart disease       PROBLEM LIST:  1. Ischemic heart disease  a. [facility] for ACS, 07/28/2011: PTCA/PCI to proximal RCA. EF 60%.  b. Stress Test, 12/02/2016: EF > 70%. No ischemia  c. MPS 09/17/2021: EF > 70%. Normal study.   2. Hyperlipidemia  3. Hypertension  4. Chronic low back pain  5. GERD  6. Obesity  7. Surgeries:  1. Right TKR      History of Present Illness    Patient is a 62-year-old  male who presents today for annual follow-up of coronary artery disease.  Patient notes overall doing well.  Since last being seen he underwent total knee replacement without any cardiac issues.  He denies any chest pain, pressure, tightness.  Denies any increasing shortness of breath.  No reported syncope, near-syncope, or edema.  Myalgias improved after discontinuation of fenofibrate.  Most recent LDL was mildly elevated.    Allergies   Allergen Reactions   • Atorvastatin Myalgia         Current Outpatient Medications:   •  acetaminophen (TYLENOL) 325 MG tablet, Take 2 tablets by mouth Every 6 (Six) Hours As Needed for Mild Pain., Disp: , Rfl:   •  aspirin 81 MG tablet, Take 1 tablet by mouth Daily., Disp: , Rfl:   •  carvedilol (COREG) 12.5 MG tablet, TAKE ONE TABLET BY MOUTH TWICE A DAY WITH MEALS (Patient taking differently: Take 1 tablet by mouth 2 (Two) Times a Day With Meals.), Disp: 60 tablet, Rfl: 11  •  Cholecalciferol (VITAMIN D) 2000 UNITS capsule, Take 1 capsule by mouth Daily., Disp: , Rfl:   •  fluticasone (FLONASE) 50 MCG/ACT nasal spray, 2 sprays by Each Nare route Daily., Disp: 48 mL, Rfl: 1  •  meloxicam (MOBIC) 15 MG tablet, TAKE ONE TABLET BY MOUTH DAILY, Disp: 30 tablet, Rfl: 2  •  Mounjaro 2.5 MG/0.5ML solution pen-injector, INJECT 2.5 MG UNDER THE SKIN ONCE WEEKLY, Disp: 2 mL, Rfl: 3  •  pantoprazole (PROTONIX) 40 MG  "EC tablet, TAKE ONE TABLET BY MOUTH DAILY (Patient taking differently: Take 1 tablet by mouth Daily.), Disp: 90 tablet, Rfl: 1  •  prasugrel (EFFIENT) 10 MG tablet, Take 1 tablet by mouth Daily., Disp: 90 tablet, Rfl: 3  •  ropivacaine (NAROPIN) 0.2 % infusion (INFUSYSTEM), 2 mg/hr by Peripheral Nerve route Continuous., Disp: , Rfl:   •  rosuvastatin (CRESTOR) 40 MG tablet, TAKE ONE TABLET BY MOUTH DAILY (Patient taking differently: Take 1 tablet by mouth Every Night.), Disp: 90 tablet, Rfl: 3  •  tamsulosin (FLOMAX) 0.4 MG capsule 24 hr capsule, Take 1 capsule by mouth Every Night., Disp: 30 capsule, Rfl: 0  •  telmisartan-hydrochlorothiazide (MICARDIS HCT) 80-25 MG per tablet, Take 1 tablet by mouth Daily., Disp: 90 tablet, Rfl: 3    The following portions of the patient's history were reviewed and updated as appropriate: allergies, current medications, past family history, past medical history, past social history, past surgical history and problem list.    Review of Systems   Constitutional: Negative.   Cardiovascular: Negative for chest pain, dyspnea on exertion, leg swelling, palpitations and syncope.   Respiratory: Negative.  Negative for shortness of breath.    Hematologic/Lymphatic: Negative for bleeding problem. Does not bruise/bleed easily.   Skin: Negative for rash.   Musculoskeletal: Positive for arthritis. Negative for muscle weakness and myalgias.   Gastrointestinal: Negative for heartburn, nausea and vomiting.   Neurological: Negative for dizziness, light-headedness, loss of balance and numbness.          Objective:     Vitals:    05/08/23 1614   BP: 134/76   BP Location: Right arm   Patient Position: Sitting   Pulse: 81   SpO2: 97%   Weight: 116 kg (255 lb)   Height: 185.4 cm (73\")         Constitutional:       Appearance: Well-developed.   Neck:      Thyroid: No thyromegaly.      Vascular: No carotid bruit or JVD.   Pulmonary:      Breath sounds: Normal breath sounds.   Cardiovascular:      Regular " rhythm.      No gallop. No S3 and S4 gallop.   Edema:     Peripheral edema absent.   Abdominal:      General: Bowel sounds are normal.      Palpations: Abdomen is soft. There is no abdominal mass.      Tenderness: There is no abdominal tenderness.   Skin:     General: Skin is warm and dry.      Findings: No rash.   Neurological:      Mental Status: Alert and oriented to person, place, and time.         Lab Review:  Lab Results   Component Value Date    GLUCOSE 77 02/07/2023    BUN 18 02/07/2023    CREATININE 0.90 02/07/2023    BCR 20.0 02/07/2023     02/07/2023    K 4.1 02/07/2023    CO2 24.0 02/07/2023    CALCIUM 9.8 02/07/2023    ALBUMIN 4.20 10/18/2022    ALKPHOS 59 10/18/2022    AST 26 10/18/2022    ALT 28 10/18/2022     Lab Results   Component Value Date    CHOL 157 10/18/2022    TRIG 69 10/18/2022    HDL 49 10/18/2022    LDL 94 10/18/2022      Lab Results   Component Value Date    WBC 10.15 02/07/2023    RBC 5.24 02/07/2023    HGB 15.3 02/07/2023    HCT 46.2 02/07/2023    MCV 88.2 02/07/2023     02/07/2023     Lab Results   Component Value Date    TSH 0.634 10/18/2022     Lab Results   Component Value Date    HGBA1C 6.00 (H) 02/07/2023          Procedures        Assessment:   Diagnoses and all orders for this visit:    1. IHD (ischemic heart disease) (Primary)    2. Essential hypertension    3. Mixed hyperlipidemia        Impression:  1. IHD, stable.  No angina.  Normal myocardial perfusion study 9/2021.  2. Essential hypertension.  Controlled.  On beta-blocker.  3. Mixed hyperlipidemia.  Last LDL elevated.  Per patient due for upcoming blood work.  On Crestor 40 mg nightly.    Plan:  1. Patient overall stable from cardiac standpoint  2. Continue current medications.  3. Discussed with patient if his LDL should remain greater than 70 on next lab draw will likely need additional medication to treat his dyslipidemia.  4. Revisit in 12 MO, or sooner as needed.        TYSON Evansibfiliberto  this dictation for Dr. Db Kitchen.      I have seen and examined the patient, I have reviewed the note, discussed the case with the advance practice clinician, made necessary changes and I agree with the final note.    Db Kitchen MD  05/08/23  17:48 EDT          Please note that portions of this note may have been completed with a voice recognition program. Efforts were made to edit the dictations, but occasionally words are mistranscribed.

## 2023-05-18 ENCOUNTER — OFFICE VISIT (OUTPATIENT)
Dept: ENDOCRINOLOGY | Facility: CLINIC | Age: 63
End: 2023-05-18
Payer: COMMERCIAL

## 2023-05-18 ENCOUNTER — PRIOR AUTHORIZATION (OUTPATIENT)
Dept: ENDOCRINOLOGY | Facility: CLINIC | Age: 63
End: 2023-05-18
Payer: COMMERCIAL

## 2023-05-18 VITALS
HEART RATE: 64 BPM | BODY MASS INDEX: 33.61 KG/M2 | DIASTOLIC BLOOD PRESSURE: 78 MMHG | WEIGHT: 253.6 LBS | SYSTOLIC BLOOD PRESSURE: 112 MMHG | OXYGEN SATURATION: 100 % | HEIGHT: 73 IN

## 2023-05-18 DIAGNOSIS — E78.2 MIXED HYPERLIPIDEMIA: ICD-10-CM

## 2023-05-18 DIAGNOSIS — E11.9 TYPE 2 DIABETES MELLITUS WITHOUT COMPLICATION, WITHOUT LONG-TERM CURRENT USE OF INSULIN: Primary | ICD-10-CM

## 2023-05-18 DIAGNOSIS — E55.9 VITAMIN D DEFICIENCY: ICD-10-CM

## 2023-05-18 DIAGNOSIS — I10 PRIMARY HYPERTENSION: ICD-10-CM

## 2023-05-18 LAB
25(OH)D3 SERPL-MCNC: 26.4 NG/ML (ref 30–100)
ALBUMIN SERPL-MCNC: 4.4 G/DL (ref 3.5–5.2)
ALBUMIN UR-MCNC: <1.2 MG/DL
ALBUMIN/GLOB SERPL: 1.6 G/DL
ALP SERPL-CCNC: 58 U/L (ref 39–117)
ALT SERPL W P-5'-P-CCNC: 27 U/L (ref 1–41)
ANION GAP SERPL CALCULATED.3IONS-SCNC: 9.8 MMOL/L (ref 5–15)
AST SERPL-CCNC: 26 U/L (ref 1–40)
BILIRUB SERPL-MCNC: 0.9 MG/DL (ref 0–1.2)
BUN SERPL-MCNC: 21 MG/DL (ref 8–23)
BUN/CREAT SERPL: 22.8 (ref 7–25)
CALCIUM SPEC-SCNC: 9.9 MG/DL (ref 8.6–10.5)
CHLORIDE SERPL-SCNC: 106 MMOL/L (ref 98–107)
CHOLEST SERPL-MCNC: 131 MG/DL (ref 0–200)
CO2 SERPL-SCNC: 25.2 MMOL/L (ref 22–29)
CREAT SERPL-MCNC: 0.92 MG/DL (ref 0.76–1.27)
CREAT UR-MCNC: 170 MG/DL
EGFRCR SERPLBLD CKD-EPI 2021: 95.2 ML/MIN/1.73
EXPIRATION DATE: NORMAL
EXPIRATION DATE: NORMAL
GLOBULIN UR ELPH-MCNC: 2.8 GM/DL
GLUCOSE BLDC GLUCOMTR-MCNC: 104 MG/DL (ref 70–130)
GLUCOSE SERPL-MCNC: 109 MG/DL (ref 65–99)
HBA1C MFR BLD: 6 %
HDLC SERPL-MCNC: 40 MG/DL (ref 40–60)
LDLC SERPL CALC-MCNC: 65 MG/DL (ref 0–100)
LDLC/HDLC SERPL: 1.54 {RATIO}
Lab: NORMAL
Lab: NORMAL
MICROALBUMIN/CREAT UR: NORMAL MG/G{CREAT}
POTASSIUM SERPL-SCNC: 4 MMOL/L (ref 3.5–5.2)
PROT SERPL-MCNC: 7.2 G/DL (ref 6–8.5)
SODIUM SERPL-SCNC: 141 MMOL/L (ref 136–145)
T4 FREE SERPL-MCNC: 1.21 NG/DL (ref 0.93–1.7)
TRIGL SERPL-MCNC: 147 MG/DL (ref 0–150)
TSH SERPL DL<=0.05 MIU/L-ACNC: 2.14 UIU/ML (ref 0.27–4.2)
VLDLC SERPL-MCNC: 26 MG/DL (ref 5–40)

## 2023-05-18 PROCEDURE — 82306 VITAMIN D 25 HYDROXY: CPT | Performed by: INTERNAL MEDICINE

## 2023-05-18 PROCEDURE — 80053 COMPREHEN METABOLIC PANEL: CPT | Performed by: INTERNAL MEDICINE

## 2023-05-18 PROCEDURE — 84443 ASSAY THYROID STIM HORMONE: CPT | Performed by: INTERNAL MEDICINE

## 2023-05-18 PROCEDURE — 82043 UR ALBUMIN QUANTITATIVE: CPT | Performed by: INTERNAL MEDICINE

## 2023-05-18 PROCEDURE — 80061 LIPID PANEL: CPT | Performed by: INTERNAL MEDICINE

## 2023-05-18 PROCEDURE — 84439 ASSAY OF FREE THYROXINE: CPT | Performed by: INTERNAL MEDICINE

## 2023-05-18 PROCEDURE — 82570 ASSAY OF URINE CREATININE: CPT | Performed by: INTERNAL MEDICINE

## 2023-05-18 RX ORDER — TIRZEPATIDE 5 MG/.5ML
5 INJECTION, SOLUTION SUBCUTANEOUS WEEKLY
Qty: 2 ML | Refills: 3 | Status: SHIPPED | OUTPATIENT
Start: 2023-05-18 | End: 2023-05-22

## 2023-05-18 RX ORDER — FLUTICASONE PROPIONATE 50 MCG
2 SPRAY, SUSPENSION (ML) NASAL DAILY
Qty: 48 ML | Refills: 1 | Status: SHIPPED | OUTPATIENT
Start: 2023-05-18

## 2023-05-18 NOTE — TELEPHONE ENCOUNTER
Received denial for Mounjaro. Pt needs to try Ozempic, Nirali, Trulicity, Victoza.    Please change and send to pharmacy

## 2023-05-18 NOTE — ASSESSMENT & PLAN NOTE
Peak a1c 6.8%  Titrate mounjaro   High risk from cv standpoint  Agree with titration and work on weight loss

## 2023-05-22 RX ORDER — SEMAGLUTIDE 0.68 MG/ML
0.5 INJECTION, SOLUTION SUBCUTANEOUS WEEKLY
Qty: 9 ML | Refills: 1 | Status: SHIPPED | OUTPATIENT
Start: 2023-05-22

## 2023-05-22 NOTE — TELEPHONE ENCOUNTER
Callie Elmore was advised the rx was denied for mounjaro so rx for ozempic was sent to the pharmacy

## 2023-05-23 ENCOUNTER — PRIOR AUTHORIZATION (OUTPATIENT)
Dept: ENDOCRINOLOGY | Facility: CLINIC | Age: 63
End: 2023-05-23
Payer: COMMERCIAL

## 2023-05-24 NOTE — TELEPHONE ENCOUNTER
SHYLA BIRMINGHAM (Key: [id number])  Rx #: [id number]  Ozempic (0.25 or 0.5 MG/DOSE) 2MG/3ML pen-injectors     Form  [facility] Electronic PA Form (2017 NCPDP)  Created  2 days ago  Sent to Plan  1 day ago  Plan Response  1 day ago  Submit Clinical Questions  24 hours ago  Determination  Favorable  4 hours ago  Message from Plan  Your PA request has been approved

## 2023-06-01 ENCOUNTER — PRIOR AUTHORIZATION (OUTPATIENT)
Dept: ENDOCRINOLOGY | Facility: CLINIC | Age: 63
End: 2023-06-01

## 2023-06-01 DIAGNOSIS — I10 ESSENTIAL HYPERTENSION: ICD-10-CM

## 2023-06-01 RX ORDER — TELMISARTAN AND HYDROCHLORTHIAZIDE 80; 25 MG/1; MG/1
TABLET ORAL
Qty: 90 TABLET | Refills: 3 | Status: SHIPPED | OUTPATIENT
Start: 2023-06-01

## 2023-06-01 RX ORDER — PANTOPRAZOLE SODIUM 40 MG/1
TABLET, DELAYED RELEASE ORAL
Qty: 90 TABLET | Refills: 1 | Status: SHIPPED | OUTPATIENT
Start: 2023-06-01

## 2023-06-02 NOTE — TELEPHONE ENCOUNTER
SHYLA BIRMINGHAM Key: [id number] - PA Case ID: [id number] - Rx #: [id number] help? Call us at [phone]  Outcome  Approvedon June 1  Your PA request has been approved. Additional information will be provided in the approval communication. (Message 1145)  Drug  Pantoprazole Sodium 40MG dr tablets  Form  [employer] Electronic PA Form (2017 NCPDP)  Original Claim Info  76 QTY LIMIT EXCD PA RQD [id number] QTY OF 90.000  DAYSQUANTITY REMAINING .000NEXT FILL DT [id number], LAST FILL [id number] @[facility],# [phone](PHARMACY HELP DESK [phone]

## 2023-07-28 ENCOUNTER — TELEPHONE (OUTPATIENT)
Dept: ORTHOPEDIC SURGERY | Facility: CLINIC | Age: 63
End: 2023-07-28
Payer: COMMERCIAL

## 2023-07-28 RX ORDER — MELOXICAM 15 MG/1
15 TABLET ORAL DAILY
Qty: 90 TABLET | Refills: 0 | Status: SHIPPED | OUTPATIENT
Start: 2023-07-28

## 2023-07-28 NOTE — TELEPHONE ENCOUNTER
Called pt to let him know Merary's message but to call back with any further questions or concerns.    Cinthia VAZ CMA ([facility]), ROT

## 2023-07-28 NOTE — TELEPHONE ENCOUNTER
Merary-please advise for this Dr. Emanuel snyder. Thanks!    Cinthia VAZ CMA ([facility]), ROT

## 2023-07-28 NOTE — TELEPHONE ENCOUNTER
Refill was provided today; however, if patient to remain on this medication long-term/chronically then further refills will have to be prescribed by PCP so that appropriate monitoring can be completed.

## 2023-07-28 NOTE — TELEPHONE ENCOUNTER
Patients wife called to get Meloxicam refill. Would like to get prescribed enough until next appointment 2/12/24. Patient states it helps with pain in Left Knee.     [facility] [place] [phone]    Please advise  [phone]

## 2023-09-08 ENCOUNTER — OFFICE VISIT (OUTPATIENT)
Dept: INTERNAL MEDICINE | Facility: CLINIC | Age: 63
End: 2023-09-08
Payer: COMMERCIAL

## 2023-09-08 VITALS
TEMPERATURE: 97.9 F | SYSTOLIC BLOOD PRESSURE: 128 MMHG | HEART RATE: 76 BPM | WEIGHT: 255 LBS | DIASTOLIC BLOOD PRESSURE: 76 MMHG | BODY MASS INDEX: 33.8 KG/M2 | OXYGEN SATURATION: 97 % | HEIGHT: 73 IN

## 2023-09-08 DIAGNOSIS — I10 PRIMARY HYPERTENSION: ICD-10-CM

## 2023-09-08 DIAGNOSIS — E78.2 MIXED HYPERLIPIDEMIA: ICD-10-CM

## 2023-09-08 DIAGNOSIS — M17.12 PRIMARY OSTEOARTHRITIS OF LEFT KNEE: ICD-10-CM

## 2023-09-08 DIAGNOSIS — I25.10 ATHEROSCLEROSIS OF NATIVE CORONARY ARTERY OF NATIVE HEART WITHOUT ANGINA PECTORIS: Primary | ICD-10-CM

## 2023-09-08 DIAGNOSIS — E55.9 VITAMIN D DEFICIENCY: ICD-10-CM

## 2023-09-08 DIAGNOSIS — E11.9 TYPE 2 DIABETES MELLITUS WITHOUT COMPLICATION, WITHOUT LONG-TERM CURRENT USE OF INSULIN: ICD-10-CM

## 2023-09-08 DIAGNOSIS — Z96.651 S/P TOTAL KNEE REPLACEMENT, RIGHT: ICD-10-CM

## 2023-09-08 DIAGNOSIS — K63.5 POLYP OF COLON, UNSPECIFIED PART OF COLON, UNSPECIFIED TYPE: ICD-10-CM

## 2023-09-08 DIAGNOSIS — N20.0 CALCULUS OF KIDNEY: ICD-10-CM

## 2023-09-08 PROBLEM — R19.02 LEFT UPPER QUADRANT ABDOMINAL MASS: Status: RESOLVED | Noted: 2020-12-14 | Resolved: 2023-09-08

## 2023-09-08 PROBLEM — L03.311 ABDOMINAL WALL CELLULITIS: Status: RESOLVED | Noted: 2021-03-11 | Resolved: 2023-09-08

## 2023-09-08 NOTE — PROGRESS NOTES
Internal Medicine New Patient  Buzz Elmore is a 63 y.o. male who presents today to establish care and with concerns as outlined below.    Chief Complaint  Chief Complaint   Patient presents with    Diabetes    Hyperlipidemia    Hypertension    Establish Care        HPI  Mr. Elmore comes in today to establish care. Primary care previously was through Dr. Rey who he continues to see for his T2DM which is well controlled on ozempic. He also has CAD with prior stent and follows with Dr. Kitchen. He is on effient and ASA, coreg, and crestor. He denies chest pain. He also follows with Dr. Castellanos for knee OA. He is s/p R knee TKA with residual pain. He notes that L knee could also be replaced but he defers. He receives injections in his knee and is on mobic. He has had kidney stones in the past. He has HTN and HLD both of which are well controlled.    Diabetes    Hyperlipidemia    Hypertension       Review of Systems  Review of Systems   Constitutional: Negative.    Respiratory: Negative.     Cardiovascular: Negative.    Gastrointestinal: Negative.    Genitourinary: Negative.    Musculoskeletal:  Positive for arthralgias.   Psychiatric/Behavioral: Negative.        Past Medical History  Past Medical History:   Diagnosis Date    Abdominal wall cellulitis 03/11/2021    Arthritis     Chronic low back pain     Coronary artery disease     GERD (gastroesophageal reflux disease)     Hyperlipidemia     Hypertension     IHD (ischemic heart disease)     Knee swelling     Obesity     Tear of meniscus of knee     Tendinitis of knee         Surgical History  Past Surgical History:   Procedure Laterality Date    CARDIAC CATHETERIZATION      COLONOSCOPY      CORONARY STENT PLACEMENT      HAND SURGERY Left     skin graft on finger, L middle finger    KNEE ARTHROTOMY      History of Arthrotomy Of Knee With Medial Meniscectomy    TOTAL KNEE ARTHROPLASTY Right 02/14/2023    Procedure: TOTAL KNEE ARTHROPLASTY WITH CORI ROBOT -  RIGHT;  Surgeon: Prince Castellanos MD;  Location: [facility];  Service: Robotics - Ortho;  Laterality: Right;        Family History  Family History   Problem Relation Age of Onset    Breast cancer Mother     Heart disease Mother     Hypertension Mother     Heart attack Mother     Hyperlipidemia Mother     Diabetes Mother     Obesity Mother     Colon cancer Father         diagnosed in his 60s    Hypertension Father     Heart attack Father     Diabetes Father     Obesity Father     Colon cancer Sister 58    No Known Problems Sister     Heart disease Brother     No Known Problems Brother     Heart disease Paternal Grandfather         Social History  Social History     Socioeconomic History    Marital status:    Tobacco Use    Smoking status: Never    Smokeless tobacco: Never   Vaping Use    Vaping Use: Never used   Substance and Sexual Activity    Alcohol use: Not Currently    Drug use: Never    Sexual activity: Yes     Partners: Female     Birth control/protection: Hysterectomy        Current Medications  Current Outpatient Medications on File Prior to Visit   Medication Sig Dispense Refill    aspirin 81 MG tablet Take 1 tablet by mouth Daily.      carvedilol (COREG) 12.5 MG tablet TAKE ONE TABLET BY MOUTH TWICE A DAY WITH MEALS (Patient taking differently: Take 1 tablet by mouth 2 (Two) Times a Day With Meals.) 60 tablet 11    Cholecalciferol (VITAMIN D) 2000 UNITS capsule Take 1 capsule by mouth Daily.      fluticasone (FLONASE) 50 MCG/ACT nasal spray 2 sprays by Each Nare route Daily. 48 mL 1    meloxicam (MOBIC) 15 MG tablet Take 1 tablet by mouth Daily. 90 tablet 0    pantoprazole (PROTONIX) 40 MG EC tablet TAKE ONE TABLET BY MOUTH DAILY 90 tablet 1    prasugrel (EFFIENT) 10 MG tablet Take 1 tablet by mouth Daily. 90 tablet 3    rosuvastatin (CRESTOR) 40 MG tablet TAKE ONE TABLET BY MOUTH DAILY (Patient taking differently: Take 1 tablet by mouth Every Night.) 90 tablet 3    Semaglutide,0.25 or 0.5MG/DOS,  "(Ozempic, 0.25 or 0.5 MG/DOSE,) 2 MG/3ML solution pen-injector Inject 0.5 mg under the skin into the appropriate area as directed 1 (One) Time Per Week. 9 mL 1    tamsulosin (FLOMAX) 0.4 MG capsule 24 hr capsule Take 1 capsule by mouth Every Night. 30 capsule 0    telmisartan-hydrochlorothiazide (MICARDIS HCT) 80-25 MG per tablet TAKE ONE TABLET BY MOUTH DAILY 90 tablet 3    [DISCONTINUED] ropivacaine (NAROPIN) 0.2 % infusion (INFUSYSTEM) 2 mg/hr by Peripheral Nerve route Continuous.       No current facility-administered medications on file prior to visit.       Allergies  Allergies   Allergen Reactions    Atorvastatin Myalgia        Objective  Visit Vitals  /76   Pulse 76   Temp 97.9 °F (36.6 °C)   Ht 185.4 cm (73\")   Wt 116 kg (255 lb)   SpO2 97%   BMI 33.64 kg/m²        Physical Exam  Physical Exam  Vitals and nursing note reviewed.   Constitutional:       General: He is not in acute distress.     Appearance: He is well-developed. He is obese. He is not ill-appearing or toxic-appearing.   HENT:      Head: Normocephalic and atraumatic.   Eyes:      Conjunctiva/sclera: Conjunctivae normal.   Cardiovascular:      Rate and Rhythm: Normal rate and regular rhythm.      Heart sounds: Normal heart sounds. No murmur heard.  Pulmonary:      Effort: Pulmonary effort is normal. No respiratory distress.      Breath sounds: Normal breath sounds.   Musculoskeletal:      Right lower leg: No edema.      Left lower leg: No edema.   Skin:     General: Skin is warm and dry.   Neurological:      General: No focal deficit present.      Mental Status: He is alert and oriented to person, place, and time.      Gait: Gait normal.   Psychiatric:         Mood and Affect: Mood normal.         Behavior: Behavior normal.         Thought Content: Thought content normal.         Judgment: Judgment normal.        Results  Results for orders placed or performed in visit on 05/18/23   Comprehensive Metabolic Panel    Specimen: Blood   Result " Value Ref Range    Glucose 109 (H) 65 - 99 mg/dL    BUN 21 8 - 23 mg/dL    Creatinine 0.92 0.76 - 1.27 mg/dL    Sodium 141 136 - 145 mmol/L    Potassium 4.0 3.5 - 5.2 mmol/L    Chloride 106 98 - 107 mmol/L    CO2 25.2 22.0 - 29.0 mmol/L    Calcium 9.9 8.6 - 10.5 mg/dL    Total Protein 7.2 6.0 - 8.5 g/dL    Albumin 4.4 3.5 - 5.2 g/dL    ALT (SGPT) 27 1 - 41 U/L    AST (SGOT) 26 1 - 40 U/L    Alkaline Phosphatase 58 39 - 117 U/L    Total Bilirubin 0.9 0.0 - 1.2 mg/dL    Globulin 2.8 gm/dL    A/G Ratio 1.6 g/dL    BUN/Creatinine Ratio 22.8 7.0 - 25.0    Anion Gap 9.8 5.0 - 15.0 mmol/L    eGFR 95.2 >60.0 mL/min/1.73   Microalbumin / Creatinine Urine Ratio - Urine, Clean Catch    Specimen: Urine, Clean Catch   Result Value Ref Range    Microalbumin/Creatinine Ratio      Creatinine, Urine 170.0 mg/dL    Microalbumin, Urine <1.2 mg/dL   Lipid Panel    Specimen: Blood   Result Value Ref Range    Total Cholesterol 131 0 - 200 mg/dL    Triglycerides 147 0 - 150 mg/dL    HDL Cholesterol 40 40 - 60 mg/dL    LDL Cholesterol  65 0 - 100 mg/dL    VLDL Cholesterol 26 5 - 40 mg/dL    LDL/HDL Ratio 1.54    TSH    Specimen: Blood   Result Value Ref Range    TSH 2.140 0.270 - 4.200 uIU/mL   T4, Free    Specimen: Blood   Result Value Ref Range    Free T4 1.21 0.93 - 1.70 ng/dL   Vitamin D,25-Hydroxy    Specimen: Arm, Left; Blood   Result Value Ref Range    25 Hydroxy, Vitamin D 26.4 (L) 30.0 - 100.0 ng/ml   POC Glycosylated Hemoglobin (Hb A1C)    Specimen: Blood   Result Value Ref Range    Hemoglobin A1C 6.0 %    Lot Number [id number]     Expiration Date 01/24/2025    POC Glucose, Blood    Specimen: Blood   Result Value Ref Range    Glucose 104 70 - 130 mg/dL    Lot Number [id number]     Expiration Date 11/18/2023         Assessment and Plan  Diagnoses and all orders for this visit:    Atherosclerosis of native coronary artery of native heart without angina pectoris  - s/p PTCA/PCI to proximal RCA in 2011  - has had negative stress test  in 2021 and denies angina today  - follows with Dr. Kitchen on effient, ASA, coreg, crestor    Calculus of kidney  - hx of obstructing L UPJ stone in 2021  - currently asymptomatic    S/P total knee replacement, right  Primary osteoarthritis of left knee  - follows with Dr. Castellanos and is s/p R TKA 2/2023 with residual pain in R knee and pain due to bone on bone arthritis in L knee  - on mobic    Type 2 diabetes mellitus without complication, without long-term current use of insulin  - following with Dr. Rey, A1c 6 on ozempic 0.5mg weekly  - urine microalbumin negative 5/2023    Vitamin D deficiency  - supplementing but level still insufficient 5/2023    Primary hypertension  - BP controlled, continue telmisartan-HCTZ 80-25mg daily and coreg 12.5mg BID    Mixed hyperlipidemia  - controlled, continue crestor 40mg daily    Polyp of colon, unspecified part of colon, unspecified type  - had colonoscopy 1/2023 with polyp and has family hx of colon cancer in dad and sister.  - next colonoscopy in 2026       Health Maintenance   Topic Date Due    Pneumococcal Vaccine 0-64 (1 - PCV) Never done    ZOSTER VACCINE (1 of 2) Never done    HEPATITIS C SCREENING  Never done    ANNUAL PHYSICAL  Never done    DIABETIC FOOT EXAM  Never done    DIABETIC EYE EXAM  Never done    TDAP/TD VACCINES (2 - Td or Tdap) 09/25/2019    COVID-19 Vaccine (2 - Pfizer series) 06/04/2021    INFLUENZA VACCINE  10/01/2023    HEMOGLOBIN A1C  11/18/2023    BMI FOLLOWUP  02/14/2024    LIPID PANEL  05/18/2024    URINE MICROALBUMIN  05/18/2024    COLORECTAL CANCER SCREENING  01/09/2026     Health Maintenance  - Colonoscopy: 1/2023, repeat 2026  - HCV: with next labs  - Immunizations: flu and COVID discussed. Defers PCV20 and shingrix. Tdap UTD.  - Depression screening: negative 9/2023    Return in about 3 months (around 12/8/2023) for Annual (any open 30 minutes).

## 2023-09-13 ENCOUNTER — OFFICE VISIT (OUTPATIENT)
Dept: ENDOCRINOLOGY | Facility: CLINIC | Age: 63
End: 2023-09-13
Payer: COMMERCIAL

## 2023-09-13 VITALS
HEIGHT: 73 IN | HEART RATE: 77 BPM | WEIGHT: 257.8 LBS | OXYGEN SATURATION: 94 % | BODY MASS INDEX: 34.17 KG/M2 | SYSTOLIC BLOOD PRESSURE: 132 MMHG | DIASTOLIC BLOOD PRESSURE: 86 MMHG

## 2023-09-13 DIAGNOSIS — I10 PRIMARY HYPERTENSION: ICD-10-CM

## 2023-09-13 DIAGNOSIS — E78.2 MIXED HYPERLIPIDEMIA: ICD-10-CM

## 2023-09-13 DIAGNOSIS — E11.9 TYPE 2 DIABETES MELLITUS WITHOUT COMPLICATION, WITHOUT LONG-TERM CURRENT USE OF INSULIN: Primary | ICD-10-CM

## 2023-09-13 LAB
EXPIRATION DATE: NORMAL
EXPIRATION DATE: NORMAL
GLUCOSE BLDC GLUCOMTR-MCNC: 111 MG/DL (ref 70–130)
HBA1C MFR BLD: 5.9 %
Lab: NORMAL
Lab: NORMAL

## 2023-09-13 PROCEDURE — 99214 OFFICE O/P EST MOD 30 MIN: CPT | Performed by: INTERNAL MEDICINE

## 2023-09-13 PROCEDURE — 83036 HEMOGLOBIN GLYCOSYLATED A1C: CPT | Performed by: INTERNAL MEDICINE

## 2023-09-13 PROCEDURE — 82947 ASSAY GLUCOSE BLOOD QUANT: CPT | Performed by: INTERNAL MEDICINE

## 2023-09-13 RX ORDER — SEMAGLUTIDE 1.34 MG/ML
1 INJECTION, SOLUTION SUBCUTANEOUS WEEKLY
Qty: 3 ML | Refills: 3 | Status: SHIPPED | OUTPATIENT
Start: 2023-09-13 | End: 2023-09-15 | Stop reason: SDUPTHER

## 2023-09-13 NOTE — PROGRESS NOTES
Buzz Elmore 63 y.o.  CC: Diabetes, Follow-up, Hypertension, and Hyperlipidemia    [place]: Diabetes, Follow-up, Hypertension, and Hyperlipidemia    Blood sugar and 90 day average sugar reviewed  Results for orders placed or performed in visit on 09/13/23   POC Glucose, Blood    Specimen: Blood   Result Value Ref Range    Glucose 111 70 - 130 mg/dL    Lot Number [id number]     Expiration Date 3-17-24    POC Glycosylated Hemoglobin (Hb A1C)    Specimen: Blood   Result Value Ref Range    Hemoglobin A1C 5.9 %    Lot Number [id number]     Expiration Date 5-2-25      Average sugar is 120   BP higher- pending nursing home  Is on low fat diet and statin   Recheck bp 135/78    Allergies   Allergen Reactions    Atorvastatin Myalgia       Current Outpatient Medications:     aspirin 81 MG tablet, Take 1 tablet by mouth Daily., Disp: , Rfl:     carvedilol (COREG) 12.5 MG tablet, TAKE ONE TABLET BY MOUTH TWICE A DAY WITH MEALS (Patient taking differently: Take 1 tablet by mouth 2 (Two) Times a Day With Meals.), Disp: 60 tablet, Rfl: 11    Cholecalciferol (VITAMIN D) 2000 UNITS capsule, Take 1 capsule by mouth Daily., Disp: , Rfl:     fluticasone (FLONASE) 50 MCG/ACT nasal spray, 2 sprays by Each Nare route Daily., Disp: 48 mL, Rfl: 1    meloxicam (MOBIC) 15 MG tablet, Take 1 tablet by mouth Daily., Disp: 90 tablet, Rfl: 0    pantoprazole (PROTONIX) 40 MG EC tablet, TAKE ONE TABLET BY MOUTH DAILY, Disp: 90 tablet, Rfl: 1    prasugrel (EFFIENT) 10 MG tablet, Take 1 tablet by mouth Daily., Disp: 90 tablet, Rfl: 3    rosuvastatin (CRESTOR) 40 MG tablet, TAKE ONE TABLET BY MOUTH DAILY (Patient taking differently: Take 1 tablet by mouth Every Night.), Disp: 90 tablet, Rfl: 3    tamsulosin (FLOMAX) 0.4 MG capsule 24 hr capsule, Take 1 capsule by mouth Every Night., Disp: 30 capsule, Rfl: 0    telmisartan-hydrochlorothiazide (MICARDIS HCT) 80-25 MG per tablet, TAKE ONE TABLET BY MOUTH DAILY, Disp: 90 tablet, Rfl: 3    Semaglutide, 1  MG/DOSE, (Ozempic, 1 MG/DOSE,) 4 MG/3ML solution pen-injector, Inject 1 mg under the skin into the appropriate area as directed 1 (One) Time Per Week., Disp: 3 mL, Rfl: 3  Patient Active Problem List    Diagnosis     Primary osteoarthritis of left knee [M17.12]     S/P total knee replacement, right [Z96.651]     Degenerative arthritis of right knee [M17.11]     Colon polyps [K63.5]     IHD (ischemic heart disease) [I25.9]     Obesity [E66.9]     Chronic low back pain [M54.50, G89.29]     Coagulation disorder [D68.9]     Benign prostatic hyperplasia with urinary obstruction [N40.1, N13.8]     Mechanical complication due to tissue graft [T85.698A]     Atherosclerosis of coronary artery [I25.10]     Type 2 diabetes mellitus without complication, without long-term current use of insulin [E11.9]     Gastroesophageal reflux disease [K21.9]     Hyperlipidemia [E78.5]     Hypertension [I10]     Lipoma [D17.9]     Calculus of kidney [N20.0]     Vitamin D deficiency [E55.9]      Review of Systems   Constitutional:  Negative for activity change, appetite change and unexpected weight change.   HENT:  Negative for congestion and rhinorrhea.    Eyes:  Negative for visual disturbance.   Respiratory:  Negative for cough and shortness of breath.    Cardiovascular:  Negative for palpitations and leg swelling.   Gastrointestinal:  Negative for constipation, diarrhea and nausea.   Genitourinary:  Negative for hematuria.   Musculoskeletal:  Positive for arthralgias (knees- has appt Dr Castellanos). Negative for back pain, gait problem, joint swelling and myalgias.   Skin:  Negative for color change, rash and wound.   Allergic/Immunologic: Negative for environmental allergies, food allergies and immunocompromised state.   Neurological:  Negative for dizziness, weakness and light-headedness.   Psychiatric/Behavioral:  Negative for confusion, decreased concentration, dysphoric mood and sleep disturbance. The patient is not nervous/anxious.   "  Social History     Socioeconomic History    Marital status:    Tobacco Use    Smoking status: Never    Smokeless tobacco: Never   Vaping Use    Vaping Use: Never used   Substance and Sexual Activity    Alcohol use: Never    Drug use: Never    Sexual activity: Yes     Partners: Female     Birth control/protection: None, Natural family planning/Rhythm     Family History   Problem Relation Age of Onset    Breast cancer Mother     Heart disease Mother     Hypertension Mother     Heart attack Mother     Hyperlipidemia Mother     Diabetes Mother     Obesity Mother     Cancer Mother     Colon cancer Father         diagnosed in his 60s    Hypertension Father     Heart attack Father     Diabetes Father     Obesity Father     Colon cancer Sister 58    No Known Problems Sister     Heart disease Brother     No Known Problems Brother     Heart disease Paternal Grandfather      /86   Pulse 77   Ht 185.4 cm (73\")   Wt 117 kg (257 lb 12.8 oz)   SpO2 94%   BMI 34.01 kg/m²   Physical Exam  Vitals and nursing note reviewed.   Constitutional:       Appearance: Normal appearance. He is well-developed.   HENT:      Head: Normocephalic and atraumatic.   Eyes:      General: Lids are normal.      Extraocular Movements: Extraocular movements intact.      Conjunctiva/sclera: Conjunctivae normal.      Pupils: Pupils are equal, round, and reactive to light.   Neck:      Thyroid: No thyroid mass or thyromegaly.      Vascular: No carotid bruit.      Trachea: Trachea normal. No tracheal deviation.   Cardiovascular:      Rate and Rhythm: Normal rate and regular rhythm.      Pulses: Normal pulses.      Heart sounds: Normal heart sounds. No murmur heard.    No friction rub. No gallop.   Pulmonary:      Effort: Pulmonary effort is normal. No respiratory distress.      Breath sounds: Normal breath sounds. No wheezing.   Musculoskeletal:         General: No deformity. Normal range of motion.      Cervical back: Normal range of motion " and neck supple.   Lymphadenopathy:      Cervical: No cervical adenopathy.   Skin:     General: Skin is warm and dry.      Findings: No erythema or rash.      Nails: There is no clubbing.   Neurological:      General: No focal deficit present.      Mental Status: He is alert and oriented to person, place, and time.      Cranial Nerves: No cranial nerve deficit.      Deep Tendon Reflexes: Reflexes are normal and symmetric. Reflexes normal.   Psychiatric:         Mood and Affect: Mood normal.         Speech: Speech normal.         Behavior: Behavior normal.         Thought Content: Thought content normal.         Judgment: Judgment normal.     Results for orders placed or performed in visit on 09/13/23   POC Glucose, Blood    Specimen: Blood   Result Value Ref Range    Glucose 111 70 - 130 mg/dL    Lot Number [id number]     Expiration Date 3-17-24    POC Glycosylated Hemoglobin (Hb A1C)    Specimen: Blood   Result Value Ref Range    Hemoglobin A1C 5.9 %    Lot Number [id number]     Expiration Date 5-2-25      Diagnoses and all orders for this visit:    1. Type 2 diabetes mellitus without complication, without long-term current use of insulin (Primary)  Assessment & Plan:  Blood sugar and 90 day average sugar reviewed  Results for orders placed or performed in visit on 09/13/23   POC Glucose, Blood    Specimen: Blood   Result Value Ref Range    Glucose 111 70 - 130 mg/dL    Lot Number [id number]     Expiration Date 3-17-24    POC Glycosylated Hemoglobin (Hb A1C)    Specimen: Blood   Result Value Ref Range    Hemoglobin A1C 5.9 %    Lot Number [id number]     Expiration Date 5-2-25      Average sugar is good  Titration ozempic and continue to monitor   Consider adding jardiance  F/u 3-4 months     Orders:  -     POC Glucose, Blood  -     POC Glycosylated Hemoglobin (Hb A1C)    2. Mixed hyperlipidemia  Assessment & Plan:  LDL 65  Taking HIT statin crestor 40 mg daily   No change for now       3. Primary  hypertension  Assessment & Plan:  Marginal even with recheck   Taking micardis hctz, coreg   More stress at work   Some white coat component   Low salt diet, use mobic prn   Recommended get cuff at home and check daily - report in 1 week        Other orders  -     Semaglutide, 1 MG/DOSE, (Ozempic, 1 MG/DOSE,) 4 MG/3ML solution pen-injector; Inject 1 mg under the skin into the appropriate area as directed 1 (One) Time Per Week.  Dispense: 3 mL; Refill: 3    Return in about 3 months (around 12/13/2023) for Recheck.    Maida Rey MD  Signed Maida Rey MD

## 2023-09-13 NOTE — ASSESSMENT & PLAN NOTE
Marginal even with recheck   Taking micardis hctz, coreg   More stress at work   Some white coat component   Low salt diet, use mobic prn   Recommended get cuff at home and check daily - report in 1 week

## 2023-09-13 NOTE — ASSESSMENT & PLAN NOTE
Blood sugar and 90 day average sugar reviewed  Results for orders placed or performed in visit on 09/13/23   POC Glucose, Blood    Specimen: Blood   Result Value Ref Range    Glucose 111 70 - 130 mg/dL    Lot Number [id number]     Expiration Date 3-17-24    POC Glycosylated Hemoglobin (Hb A1C)    Specimen: Blood   Result Value Ref Range    Hemoglobin A1C 5.9 %    Lot Number [id number]     Expiration Date 5-2-25      Average sugar is good  Titration ozempic and continue to monitor   Consider adding jardiance  F/u 3-4 months

## 2023-09-14 ENCOUNTER — TELEPHONE (OUTPATIENT)
Dept: ORTHOPEDIC SURGERY | Facility: CLINIC | Age: 63
End: 2023-09-14

## 2023-09-14 NOTE — TELEPHONE ENCOUNTER
Caller: Gabrielle Elmore    Relationship to patient: Emergency Contact    Best call back number: [phone]    Chief complaint: LEFT KNEE     Type of visit: INJECTION     Requested date: FIRST TWO WEEKS OF NOVEMBER AS LATE IN THE DAY AS POSSIBLE

## 2023-09-15 RX ORDER — SEMAGLUTIDE 1.34 MG/ML
1 INJECTION, SOLUTION SUBCUTANEOUS WEEKLY
Qty: 9 ML | Refills: 1 | Status: SHIPPED | OUTPATIENT
Start: 2023-09-15

## 2023-10-24 RX ORDER — CARVEDILOL 12.5 MG/1
TABLET ORAL
Qty: 60 TABLET | Refills: 11 | Status: SHIPPED | OUTPATIENT
Start: 2023-10-24

## 2023-11-06 RX ORDER — SEMAGLUTIDE 0.68 MG/ML
INJECTION, SOLUTION SUBCUTANEOUS
Qty: 9 ML | Refills: 1 | OUTPATIENT
Start: 2023-11-06

## 2023-11-06 NOTE — TELEPHONE ENCOUNTER
Rx Refill Note    Requested Prescriptions     Pending Prescriptions Disp Refills    Ozempic, 0.25 or 0.5 MG/DOSE, 2 MG/3ML solution pen-injector [Pharmacy Med Name: OZEMPIC 0.25-0.5 MG/DOSE(2 MG/3 ML)] 9 mL 1     Sig: DIAL AND INJECT UNDER THE SKIN 0.5 MG WEEKLY        Last office visit with prescribing clinician: 9/13/2023       Next office visit with prescribing clinician: 3/12/2024     {    Cynthia Wray MA  11/06/23, 11:01 EST

## 2023-11-13 ENCOUNTER — OFFICE VISIT (OUTPATIENT)
Dept: ORTHOPEDIC SURGERY | Facility: CLINIC | Age: 63
End: 2023-11-13
Payer: COMMERCIAL

## 2023-11-13 VITALS
HEIGHT: 73 IN | BODY MASS INDEX: 32.89 KG/M2 | SYSTOLIC BLOOD PRESSURE: 126 MMHG | WEIGHT: 248.2 LBS | DIASTOLIC BLOOD PRESSURE: 90 MMHG

## 2023-11-13 DIAGNOSIS — M17.0 PRIMARY OSTEOARTHRITIS OF BOTH KNEES: Primary | ICD-10-CM

## 2023-11-13 DIAGNOSIS — Z96.651 S/P TOTAL KNEE REPLACEMENT, RIGHT: ICD-10-CM

## 2023-11-13 PROCEDURE — 20610 DRAIN/INJ JOINT/BURSA W/O US: CPT | Performed by: ORTHOPAEDIC SURGERY

## 2023-11-13 PROCEDURE — 99213 OFFICE O/P EST LOW 20 MIN: CPT | Performed by: ORTHOPAEDIC SURGERY

## 2023-11-13 RX ORDER — ROPIVACAINE HYDROCHLORIDE 5 MG/ML
4 INJECTION, SOLUTION EPIDURAL; INFILTRATION; PERINEURAL
Status: COMPLETED | OUTPATIENT
Start: 2023-11-13 | End: 2023-11-13

## 2023-11-13 RX ORDER — TRIAMCINOLONE ACETONIDE 40 MG/ML
40 INJECTION, SUSPENSION INTRA-ARTICULAR; INTRAMUSCULAR
Status: COMPLETED | OUTPATIENT
Start: 2023-11-13 | End: 2023-11-13

## 2023-11-13 RX ADMIN — ROPIVACAINE HYDROCHLORIDE 4 ML: 5 INJECTION, SOLUTION EPIDURAL; INFILTRATION; PERINEURAL at 16:22

## 2023-11-13 RX ADMIN — TRIAMCINOLONE ACETONIDE 40 MG: 40 INJECTION, SUSPENSION INTRA-ARTICULAR; INTRAMUSCULAR at 16:22

## 2023-11-13 NOTE — PROGRESS NOTES
Procedure   - Large Joint Arthrocentesis: L knee on 11/13/2023 4:22 PM  Indications: pain  Details: 21 G needle, anterolateral approach  Medications: 40 mg triamcinolone acetonide 40 MG/ML; 4 mL ropivacaine 0.5 %  Outcome: tolerated well, no immediate complications  Procedure, treatment alternatives, risks and benefits explained, specific risks discussed. Consent was given by the patient. Immediately prior to procedure a time out was called to verify the correct patient, procedure, equipment, support staff and site/side marked as required. Patient was prepped and draped in the usual sterile fashion.

## 2023-11-13 NOTE — PROGRESS NOTES
[facility] Note    Subjective     Chief Complaint   Patient presents with    Follow-up     7 Month Follow Up -- Osteoarthritis left knee - S/P right total knee arthroplasty (2/14/23)        HPI    It has been 7  month(s) since Mr. Elmore's last visit. He returns to clinic today for follow-up of left knee arthritis and  right total knee arthroplasty.  The issue has been ongoing for 5 year(s). He rates his pain a 7/10 on the pain scale. Previous/current treatments: bracing, NSAIDS, physical therapy, weight loss, and steroid injection (last injection 10/17/2022). Current symptoms: same as prior visit. The pain is worse with walking, standing, sitting, climbing stairs, and working; resting and ice improve the pain. Overall, he is doing better.  He would like an injection for his left knee today.  Overall his right knee is 80% improved compared to his preoperative symptoms.  Occasional lateral pain, with some numbness laterally.  He is not yet interested in left total knee arthroplasty surgery.    I have reviewed the following portions of the patient's history and agree with: History of Present Illness and Review of Systems    Patient Active Problem List   Diagnosis    Coagulation disorder    Benign prostatic hyperplasia with urinary obstruction    Mechanical complication due to tissue graft    Atherosclerosis of coronary artery    Type 2 diabetes mellitus without complication, without long-term current use of insulin    Gastroesophageal reflux disease    Hyperlipidemia    Hypertension    Lipoma    Calculus of kidney    Vitamin D deficiency    IHD (ischemic heart disease)    Obesity    Chronic low back pain    Colon polyps    Degenerative arthritis of right knee    S/P total knee replacement, right    Primary osteoarthritis of left knee     Past Medical History:   Diagnosis Date    Abdominal wall cellulitis 03/11/2021    Arthritis     Chronic low back pain     Coagulation disorder 06/20/2016     Coronary artery disease     GERD (gastroesophageal reflux disease)     Hyperlipidemia     Hypertension     IHD (ischemic heart disease)     IHD (ischemic heart disease)     Knee swelling     Obesity     Tear of meniscus of knee     Tendinitis of knee     Type 2 diabetes mellitus without complication, without long-term current use of insulin 06/20/2016      Past Surgical History:   Procedure Laterality Date    CARDIAC CATHETERIZATION      COLONOSCOPY      CORONARY STENT PLACEMENT      HAND SURGERY Left     skin graft on finger, L middle finger    KNEE ARTHROTOMY      History of Arthrotomy Of Knee With Medial Meniscectomy    TOTAL KNEE ARTHROPLASTY Right 02/14/2023    Procedure: TOTAL KNEE ARTHROPLASTY WITH CORI ROBOT - RIGHT;  Surgeon: Prince Castellanos MD;  Location: [facility];  Service: Robotics - Ortho;  Laterality: Right;      Family History   Problem Relation Age of Onset    Breast cancer Mother     Heart disease Mother     Hypertension Mother     Heart attack Mother     Hyperlipidemia Mother     Diabetes Mother     Obesity Mother     Cancer Mother     Colon cancer Father         diagnosed in his 60s    Hypertension Father     Heart attack Father     Diabetes Father     Obesity Father     Colon cancer Sister 58    No Known Problems Sister     Heart disease Brother     No Known Problems Brother     Heart disease Paternal Grandfather      Social History     Socioeconomic History    Marital status:    Tobacco Use    Smoking status: Never     Passive exposure: Never    Smokeless tobacco: Never   Vaping Use    Vaping Use: Never used   Substance and Sexual Activity    Alcohol use: Never    Drug use: Never    Sexual activity: Yes     Partners: Female     Birth control/protection: None, Natural family planning/Rhythm      Current Outpatient Medications on File Prior to Visit   Medication Sig Dispense Refill    aspirin 81 MG tablet Take 1 tablet by mouth Daily.      carvedilol (COREG) 12.5 MG tablet TAKE ONE TABLET  BY MOUTH TWICE A DAY WITH MEALS 60 tablet 11    Cholecalciferol (VITAMIN D) 2000 UNITS capsule Take 1 capsule by mouth Daily.      fluticasone (FLONASE) 50 MCG/ACT nasal spray 2 sprays by Each Nare route Daily. 48 mL 1    meloxicam (MOBIC) 15 MG tablet Take 1 tablet by mouth Daily. 90 tablet 0    pantoprazole (PROTONIX) 40 MG EC tablet TAKE ONE TABLET BY MOUTH DAILY 90 tablet 1    prasugrel (EFFIENT) 10 MG tablet Take 1 tablet by mouth Daily. 90 tablet 3    rosuvastatin (CRESTOR) 40 MG tablet TAKE ONE TABLET BY MOUTH DAILY (Patient taking differently: Take 1 tablet by mouth Every Night.) 90 tablet 3    Semaglutide, 1 MG/DOSE, (Ozempic, 1 MG/DOSE,) 4 MG/3ML solution pen-injector Inject 1 mg under the skin into the appropriate area as directed 1 (One) Time Per Week. 9 mL 1    tamsulosin (FLOMAX) 0.4 MG capsule 24 hr capsule Take 1 capsule by mouth Every Night. 30 capsule 0    telmisartan-hydrochlorothiazide (MICARDIS HCT) 80-25 MG per tablet TAKE ONE TABLET BY MOUTH DAILY 90 tablet 3     No current facility-administered medications on file prior to visit.      Allergies   Allergen Reactions    Atorvastatin Myalgia        Review of Systems   Constitutional:  Negative for activity change, appetite change, chills, diaphoresis, fatigue, fever and unexpected weight change.   HENT:  Negative for congestion, dental problem, drooling, ear discharge, ear pain, facial swelling, hearing loss, mouth sores, nosebleeds, postnasal drip, rhinorrhea, sinus pressure, sneezing, sore throat, tinnitus, trouble swallowing and voice change.    Eyes:  Negative for photophobia, pain, discharge, redness, itching and visual disturbance.   Respiratory:  Negative for apnea, cough, choking, chest tightness, shortness of breath, wheezing and stridor.    Cardiovascular:  Negative for chest pain, palpitations and leg swelling.   Gastrointestinal:  Negative for abdominal distention, abdominal pain, anal bleeding, blood in stool, constipation,  "diarrhea, nausea, rectal pain and vomiting.   Endocrine: Negative for cold intolerance, heat intolerance, polydipsia, polyphagia and polyuria.   Genitourinary:  Negative for decreased urine volume, difficulty urinating, dysuria, enuresis, flank pain, frequency, genital sores, hematuria and urgency.   Musculoskeletal:  Positive for arthralgias. Negative for back pain, gait problem, joint swelling, myalgias, neck pain and neck stiffness.   Skin:  Negative for color change, pallor, rash and wound.   Allergic/Immunologic: Negative for environmental allergies, food allergies and immunocompromised state.   Neurological:  Negative for dizziness, tremors, seizures, syncope, facial asymmetry, speech difficulty, weakness, light-headedness, numbness and headaches.   Hematological:  Negative for adenopathy. Does not bruise/bleed easily.   Psychiatric/Behavioral:  Negative for agitation, behavioral problems, confusion, decreased concentration, dysphoric mood, hallucinations, self-injury, sleep disturbance and suicidal ideas. The patient is not nervous/anxious and is not hyperactive.         Objective      Physical Exam  /90 (BP Location: Left arm, Patient Position: Sitting, Cuff Size: Adult)   Ht 185.4 cm (72.99\")   Wt 113 kg (248 lb 3.2 oz)   BMI 32.75 kg/m²     Body mass index is 32.75 kg/m².    General:   Mental Status:  Alert   Appearance: Cooperative, in no acute distress   Build and Nutrition: Well-nourished well-developed male   Orientation: Alert and oriented to person, place and time   Posture: Normal   Gait: Nonantalgic    Integument:   Right knee: Incision well-healed   Left knee: no skin lesions, no rash, no ecchymosis    Lower Extremities:   Right Knee:    Tenderness:  None    Effusion:  None    Swelling:  None    Crepitus:  None    Atrophy:  None    Range of motion:  Extension: 0°       Flexion: 125°  Instability:  No varus laxity, no valgus laxity, negative anterior drawer  Deformities:  None   Left " Knee:    Tenderness:  Medial joint line tenderness    Effusion:  None    Swelling:  None    Crepitus: Positive    Atrophy:  None    Range of motion:  Extension: 0°       Flexion: 120°  Instability:  No varus laxity, no valgus laxity, negative anterior drawer  Deformities:  Varus      Imaging/Studies  Imaging Results (Last 24 Hours)       Procedure Component Value Units Date/Time    XR Knee 3+ View With Doran Right [[id number]] Resulted: 11/13/23 1636     Updated: 11/13/23 1639    Narrative:      Right Knee Radiographs  Indication: status-post right total knee arthroplasty  Views: AP, lateral, and sunrise views of the right knee    Comparison: no change compared to prior study, 3/6/2023    Findings:   The components are well aligned, with no signs of loosening or failure.    Small lucency under the medial aspect of the tibial plate.      XR Knee 4+ View Left [[id number]] Resulted: 11/13/23 1635     Updated: 11/13/23 1637    Narrative:      Right Knee Radiographs  Indication: status-post right total knee arthroplasty  Views: AP, lateral, and sunrise views of the right knee    Comparison: no change compared to prior study, 3/6/2023    Findings:   The components are well aligned, with no signs of loosening or failure.                 Assessment and Plan     Diagnoses and all orders for this visit:    1. Primary osteoarthritis of both knees (Primary)  -     XR Knee 4+ View Left  -     - Large Joint Arthrocentesis: L knee    2. S/P total knee replacement, right  -     XR Knee 3+ View With Doran Right        1. Primary osteoarthritis of both knees    2. S/P total knee replacement, right        I reviewed my findings with the patient.  His right total knee arthroplasty appears to be functioning well.  I will see him back in 3 months, which will be a 1 year checkup with x-rays.    Regarding his left knee, he would like to have an injection and this was provided today.  I will see him back in 3 months also for checkup, but  I will be happy to see him back sooner for any problems.    Procedure Note:  The potential benefits of performing a therapeutic left knee joint injection, as well as potential risks (including, but not limited to infection, swelling, pain, bleeding, bruising, nerve/blood vessel damage, skin color changes, transient elevation in blood glucose levels, and fat atrophy) were discussed with the patient.  After informed consent, timeout procedure was performed, and the skin on the left knee was prepped with chlorhexidine soap and alcohol, after which ethyl chloride was applied to the skin at the injection site. Via the anterolateral approach, 1ml of Kenalog 40mg/ml mixed with 4ml 0.5% ropivacaine plain was injected into the knee joint.  The patient tolerated the procedure well, experiencing 100% improvement a few minutes following the injection. There were no complications.  Band-Aid was applied to the injection site. Post-procedural instructions were given to the patient and/or their caregiver.      Return in about 3 months (around 2/13/2024) for Recheck with X-Rays on the right knee.      Prince Castellanos MD  11/13/23  17:04 EST

## 2023-11-14 ENCOUNTER — TELEPHONE (OUTPATIENT)
Dept: ORTHOPEDIC SURGERY | Facility: CLINIC | Age: 63
End: 2023-11-14
Payer: COMMERCIAL

## 2023-11-14 RX ORDER — MELOXICAM 15 MG/1
15 TABLET ORAL DAILY
Qty: 90 TABLET | Refills: 0 | Status: SHIPPED | OUTPATIENT
Start: 2023-11-14

## 2023-11-14 NOTE — TELEPHONE ENCOUNTER
Wife called advising patient forgot to mention he needs another mesh looking brace for left leg.     Please advise  [phone]

## 2023-11-27 RX ORDER — ROSUVASTATIN CALCIUM 40 MG/1
TABLET, COATED ORAL
Qty: 90 TABLET | Refills: 3 | Status: SHIPPED | OUTPATIENT
Start: 2023-11-27

## 2023-11-27 RX ORDER — PANTOPRAZOLE SODIUM 40 MG/1
40 TABLET, DELAYED RELEASE ORAL DAILY
Qty: 90 TABLET | Refills: 1 | Status: SHIPPED | OUTPATIENT
Start: 2023-11-27

## 2023-12-11 ENCOUNTER — OFFICE VISIT (OUTPATIENT)
Dept: INTERNAL MEDICINE | Facility: CLINIC | Age: 63
End: 2023-12-11
Payer: COMMERCIAL

## 2023-12-11 ENCOUNTER — LAB (OUTPATIENT)
Dept: LAB | Facility: HOSPITAL | Age: 63
End: 2023-12-11
Payer: COMMERCIAL

## 2023-12-11 VITALS
HEIGHT: 72 IN | DIASTOLIC BLOOD PRESSURE: 80 MMHG | WEIGHT: 259 LBS | SYSTOLIC BLOOD PRESSURE: 128 MMHG | OXYGEN SATURATION: 97 % | BODY MASS INDEX: 35.08 KG/M2 | HEART RATE: 94 BPM | TEMPERATURE: 97.5 F

## 2023-12-11 DIAGNOSIS — E55.9 VITAMIN D DEFICIENCY: ICD-10-CM

## 2023-12-11 DIAGNOSIS — Z96.651 S/P TOTAL KNEE REPLACEMENT, RIGHT: ICD-10-CM

## 2023-12-11 DIAGNOSIS — N20.0 CALCULUS OF KIDNEY: ICD-10-CM

## 2023-12-11 DIAGNOSIS — E11.9 TYPE 2 DIABETES MELLITUS WITHOUT COMPLICATION, WITHOUT LONG-TERM CURRENT USE OF INSULIN: ICD-10-CM

## 2023-12-11 DIAGNOSIS — M17.12 PRIMARY OSTEOARTHRITIS OF LEFT KNEE: ICD-10-CM

## 2023-12-11 DIAGNOSIS — I25.10 ATHEROSCLEROSIS OF NATIVE CORONARY ARTERY OF NATIVE HEART WITHOUT ANGINA PECTORIS: ICD-10-CM

## 2023-12-11 DIAGNOSIS — I10 PRIMARY HYPERTENSION: ICD-10-CM

## 2023-12-11 DIAGNOSIS — K21.9 GASTROESOPHAGEAL REFLUX DISEASE WITHOUT ESOPHAGITIS: ICD-10-CM

## 2023-12-11 DIAGNOSIS — K63.5 POLYP OF COLON, UNSPECIFIED PART OF COLON, UNSPECIFIED TYPE: ICD-10-CM

## 2023-12-11 DIAGNOSIS — Z00.00 ANNUAL PHYSICAL EXAM: ICD-10-CM

## 2023-12-11 DIAGNOSIS — Z12.5 SCREENING FOR PROSTATE CANCER: ICD-10-CM

## 2023-12-11 DIAGNOSIS — E78.2 MIXED HYPERLIPIDEMIA: ICD-10-CM

## 2023-12-11 DIAGNOSIS — N40.1 BENIGN PROSTATIC HYPERPLASIA WITH LOWER URINARY TRACT SYMPTOMS, SYMPTOM DETAILS UNSPECIFIED: ICD-10-CM

## 2023-12-11 DIAGNOSIS — Z00.00 ANNUAL PHYSICAL EXAM: Primary | ICD-10-CM

## 2023-12-11 DIAGNOSIS — Z80.0 FAMILY HISTORY OF COLON CANCER: ICD-10-CM

## 2023-12-11 PROCEDURE — 85027 COMPLETE CBC AUTOMATED: CPT

## 2023-12-11 PROCEDURE — 80053 COMPREHEN METABOLIC PANEL: CPT

## 2023-12-11 PROCEDURE — 82306 VITAMIN D 25 HYDROXY: CPT

## 2023-12-11 PROCEDURE — G0103 PSA SCREENING: HCPCS

## 2023-12-11 NOTE — PROGRESS NOTES
Internal Medicine Annual Exam  Buzz Elmore is a 63 y.o. male who presents today for an annual exam and with concerns as outlined below.    Chief Complaint  Chief Complaint   Patient presents with    Annual Exam        HPI  Mr. Elmore comes in today for his physical. He is doing well. No major medication changes. He continues routine care with Dr. Kitchen, Dr. Castellanos, and Dr. Rey. He is up to date with his dental and vision exams, colonoscopy. He does wear long sleeves, hats, and sunscreen to protect his skin and wears hearing protection when needed. He declines vaccines today. Denies use of tobacco, ecigarettes, illicit drugs, and alcohol. He reports difficulty with portion control and wife's good cooking.           Review of Systems  Review of Systems   Hematological:  Bruises/bleeds easily.   All other systems reviewed and are negative.       Past Medical History  Past Medical History:   Diagnosis Date    Abdominal wall cellulitis 03/11/2021    Arthritis     Chronic low back pain     Coagulation disorder 06/20/2016    Coronary artery disease     GERD (gastroesophageal reflux disease)     Hyperlipidemia     Hypertension     IHD (ischemic heart disease)     IHD (ischemic heart disease)     Knee swelling     Obesity     Tear of meniscus of knee     Tendinitis of knee     Type 2 diabetes mellitus without complication, without long-term current use of insulin 06/20/2016        Surgical History  Past Surgical History:   Procedure Laterality Date    CARDIAC CATHETERIZATION      COLONOSCOPY      CORONARY STENT PLACEMENT      HAND SURGERY Left     skin graft on finger, L middle finger    KNEE ARTHROTOMY      History of Arthrotomy Of Knee With Medial Meniscectomy    TOTAL KNEE ARTHROPLASTY Right 02/14/2023    Procedure: TOTAL KNEE ARTHROPLASTY WITH CORI ROBOT - RIGHT;  Surgeon: Prince Castellanos MD;  Location: [facility];  Service: Robotics - Ortho;  Laterality: Right;        Family History  Family History    Problem Relation Age of Onset    Breast cancer Mother     Heart disease Mother     Hypertension Mother     Heart attack Mother     Hyperlipidemia Mother     Diabetes Mother     Obesity Mother     Cancer Mother     Colon cancer Father         diagnosed in his 60s    Hypertension Father     Heart attack Father     Diabetes Father     Obesity Father     Colon cancer Sister 58    No Known Problems Sister     Heart disease Brother     No Known Problems Brother     Heart disease Paternal Grandfather         Social History  Social History     Socioeconomic History    Marital status:    Tobacco Use    Smoking status: Never     Passive exposure: Never    Smokeless tobacco: Never   Vaping Use    Vaping Use: Never used   Substance and Sexual Activity    Alcohol use: Never    Drug use: Never    Sexual activity: Yes     Partners: Female     Birth control/protection: None, Natural family planning/Rhythm        Current Medications  Current Outpatient Medications on File Prior to Visit   Medication Sig Dispense Refill    aspirin 81 MG tablet Take 1 tablet by mouth Daily.      carvedilol (COREG) 12.5 MG tablet TAKE ONE TABLET BY MOUTH TWICE A DAY WITH MEALS 60 tablet 11    Cholecalciferol (VITAMIN D) 2000 UNITS capsule Take 1 capsule by mouth Daily.      fluticasone (FLONASE) 50 MCG/ACT nasal spray 2 sprays by Each Nare route Daily. 48 mL 1    meloxicam (MOBIC) 15 MG tablet TAKE 1 TABLET BY MOUTH DAILY 90 tablet 0    pantoprazole (PROTONIX) 40 MG EC tablet TAKE 1 TABLET BY MOUTH DAILY 90 tablet 1    prasugrel (EFFIENT) 10 MG tablet Take 1 tablet by mouth Daily. 90 tablet 3    rosuvastatin (CRESTOR) 40 MG tablet TAKE ONE TABLET BY MOUTH DAILY 90 tablet 3    Semaglutide, 1 MG/DOSE, (Ozempic, 1 MG/DOSE,) 4 MG/3ML solution pen-injector Inject 1 mg under the skin into the appropriate area as directed 1 (One) Time Per Week. 9 mL 1    tamsulosin (FLOMAX) 0.4 MG capsule 24 hr capsule Take 1 capsule by mouth Every Night. 30 capsule  "0    telmisartan-hydrochlorothiazide (MICARDIS HCT) 80-25 MG per tablet TAKE ONE TABLET BY MOUTH DAILY 90 tablet 3     No current facility-administered medications on file prior to visit.       Allergies  Allergies   Allergen Reactions    Atorvastatin Myalgia        Objective  Visit Vitals  /82   Pulse 94   Temp 97.5 °F (36.4 °C)   Ht 182.9 cm (72\")   Wt 117 kg (259 lb)   SpO2 97%   BMI 35.13 kg/m²        Physical Exam  Physical Exam  Vitals and nursing note reviewed.   Constitutional:       General: He is not in acute distress.     Appearance: He is well-developed. He is obese. He is not ill-appearing, toxic-appearing or diaphoretic.   HENT:      Head: Normocephalic and atraumatic.      Right Ear: Ear canal and external ear normal.      Left Ear: Ear canal and external ear normal.      Ears:      Comments: Tympanosclerosis bilaterally     Nose: Nose normal.   Eyes:      General: No scleral icterus.     Conjunctiva/sclera: Conjunctivae normal.   Cardiovascular:      Rate and Rhythm: Normal rate and regular rhythm.      Heart sounds: Normal heart sounds. No murmur heard.  Pulmonary:      Effort: Pulmonary effort is normal. No respiratory distress.      Breath sounds: Normal breath sounds.   Abdominal:      General: Bowel sounds are normal. There is distension (obese).      Palpations: Abdomen is soft. There is no mass.      Tenderness: There is no abdominal tenderness.   Musculoskeletal:         General: No deformity.      Cervical back: Neck supple.      Right lower leg: No edema.      Left lower leg: No edema.   Lymphadenopathy:      Cervical: No cervical adenopathy.   Skin:     General: Skin is warm and dry.      Findings: No rash.   Neurological:      Mental Status: He is alert and oriented to person, place, and time. Mental status is at baseline.      Gait: Gait normal.   Psychiatric:         Mood and Affect: Mood normal.         Behavior: Behavior normal.         Thought Content: Thought content normal.   "       Judgment: Judgment normal.          Results  Results for orders placed or performed in visit on 09/13/23   POC Glucose, Blood    Specimen: Blood   Result Value Ref Range    Glucose 111 70 - 130 mg/dL    Lot Number [id number]     Expiration Date 3-17-24    POC Glycosylated Hemoglobin (Hb A1C)    Specimen: Blood   Result Value Ref Range    Hemoglobin A1C 5.9 %    Lot Number [id number]     Expiration Date 5-2-25         Assessment and Plan  Diagnoses and all orders for this visit:    Annual physical exam  - Counseling was given to patient for the following topics:  healthy eating habits, disease prevention, importance of immunizations, including risks and benefits, and sun safety. Also discussed the importance of regular dental and vision care.  -     CBC (No Diff); Future    Atherosclerosis of native coronary artery of native heart without angina pectoris  - s/p PTCA/PCI to proximal RCA in 2011  - has had negative stress test in 2021 and denies angina today  - follows with Dr. Kitchen on effient, ASA, coreg, crestor    Calculus of kidney  - hx of obstructing L UPJ stone in 2021  - currently asymptomatic    Primary osteoarthritis of left knee  S/P total knee replacement, right  - follows with Dr. Castellanos and is s/p R TKA 2/2023 with residual pain in R knee and pain due to bone on bone arthritis in L knee  - on mobic 15mg daily. Patient requests this be filled in our office. He will call for next refill.    Type 2 diabetes mellitus without complication, without long-term current use of insulin  - following with Dr. Rey, A1c 5.9 on ozempic 0.5mg weekly  - urine microalbumin negative 5/2023    Vitamin D deficiency  - supplementing but level still insufficient 5/2023 so dose increased  - recheck vitamin D level    Primary hypertension  - BP controlled, continue telmisartan-HCTZ 80-25mg daily and coreg 12.5mg BID   - CMP today    Mixed hyperlipidemia  - controlled on lipids 5/2023, continue crestor 40mg daily      Polyp of colon, unspecified part of colon, unspecified type  Family history of colon cancer  - had colonoscopy 1/2023 with polyp and has family hx of colon cancer in dad and sister.  - next colonoscopy in 2026    Gastroesophageal reflux disease without esophagitis  - stable, continue pantoprazole 40mg daily    Benign prostatic hyperplasia with lower urinary tract symptoms, symptom details unspecified  - stable, continue tamsulosin    Screening for prostate cancer  -     PSA Screen; Future    Health Maintenance   Topic Date Due    Pneumococcal Vaccine 0-64 (1 - PCV) Never done    ZOSTER VACCINE (1 of 2) Never done    HEPATITIS C SCREENING  Never done    DIABETIC FOOT EXAM  Never done    DIABETIC EYE EXAM  Never done    TDAP/TD VACCINES (2 - Td or Tdap) 09/25/2019    INFLUENZA VACCINE  08/01/2023    COVID-19 Vaccine (3 - 2023-24 season) 09/01/2023    HEMOGLOBIN A1C  03/13/2024    LIPID PANEL  05/18/2024    URINE MICROALBUMIN  05/18/2024    BMI FOLLOWUP  09/15/2024    ANNUAL PHYSICAL  12/11/2024    COLORECTAL CANCER SCREENING  01/09/2026     Health Maintenance  - Colonoscopy: 1/2023, repeat 2026  - HCV: negative  - Immunizations: flu and COVID discussed. Defers PCV20 and shingrix. Tdap 2021.  - Depression screening: negative 9/2023    Return in about 6 months (around 6/11/2024) for Follow up, 1 year for annual, Labs today.

## 2023-12-12 LAB
25(OH)D3 SERPL-MCNC: 32 NG/ML (ref 30–100)
ALBUMIN SERPL-MCNC: 4.5 G/DL (ref 3.5–5.2)
ALBUMIN/GLOB SERPL: 1.7 G/DL
ALP SERPL-CCNC: 56 U/L (ref 39–117)
ALT SERPL W P-5'-P-CCNC: 27 U/L (ref 1–41)
ANION GAP SERPL CALCULATED.3IONS-SCNC: 11 MMOL/L (ref 5–15)
AST SERPL-CCNC: 27 U/L (ref 1–40)
BILIRUB SERPL-MCNC: 0.5 MG/DL (ref 0–1.2)
BUN SERPL-MCNC: 17 MG/DL (ref 8–23)
BUN/CREAT SERPL: 12.8 (ref 7–25)
CALCIUM SPEC-SCNC: 9.8 MG/DL (ref 8.6–10.5)
CHLORIDE SERPL-SCNC: 103 MMOL/L (ref 98–107)
CO2 SERPL-SCNC: 25 MMOL/L (ref 22–29)
CREAT SERPL-MCNC: 1.33 MG/DL (ref 0.76–1.27)
DEPRECATED RDW RBC AUTO: 39.5 FL (ref 37–54)
EGFRCR SERPLBLD CKD-EPI 2021: 60.8 ML/MIN/1.73
ERYTHROCYTE [DISTWIDTH] IN BLOOD BY AUTOMATED COUNT: 13 % (ref 12.3–15.4)
GLOBULIN UR ELPH-MCNC: 2.6 GM/DL
GLUCOSE SERPL-MCNC: 88 MG/DL (ref 65–99)
HCT VFR BLD AUTO: 44.6 % (ref 37.5–51)
HGB BLD-MCNC: 14.5 G/DL (ref 13–17.7)
MCH RBC QN AUTO: 28.2 PG (ref 26.6–33)
MCHC RBC AUTO-ENTMCNC: 32.5 G/DL (ref 31.5–35.7)
MCV RBC AUTO: 86.8 FL (ref 79–97)
PLATELET # BLD AUTO: 195 10*3/MM3 (ref 140–450)
PMV BLD AUTO: 10.1 FL (ref 6–12)
POTASSIUM SERPL-SCNC: 4.1 MMOL/L (ref 3.5–5.2)
PROT SERPL-MCNC: 7.1 G/DL (ref 6–8.5)
PSA SERPL-MCNC: 0.96 NG/ML (ref 0–4)
RBC # BLD AUTO: 5.14 10*6/MM3 (ref 4.14–5.8)
SODIUM SERPL-SCNC: 139 MMOL/L (ref 136–145)
WBC NRBC COR # BLD AUTO: 7.84 10*3/MM3 (ref 3.4–10.8)

## 2024-01-04 ENCOUNTER — TELEMEDICINE (OUTPATIENT)
Dept: FAMILY MEDICINE CLINIC | Facility: TELEHEALTH | Age: 64
End: 2024-01-04
Payer: COMMERCIAL

## 2024-01-04 DIAGNOSIS — U07.1 COVID-19: Primary | ICD-10-CM

## 2024-01-04 RX ORDER — FENOFIBRATE 48 MG/1
TABLET, COATED ORAL DAILY
COMMUNITY

## 2024-01-04 NOTE — LETTER
After Visit Summary   4/3/2018    Michael Fernandez    MRN: [id number]           Patient Information     Date Of Birth          1963        Visit Information        Provider Department      4/3/2018 8:45 AM Carlos Loving MD [facility]        Today's Diagnoses     Need for vaccination    -  1    Obstructive sleep apnea syndrome        Tobacco abuse        Tobacco abuse counseling        Routine general medical examination at a health care facility        Contact dermatitis, unspecified contact dermatitis type, unspecified trigger          Care Instructions      HOW TO QUIT SMOKING  Smoking is one of the hardest habits to break. About half of all those who have ever smoked have been able to quit, and most of those (about 70%) who still smoke want to quit. Here are some of the best ways to stop smoking.     KEEP TRYING:  It takes most smokers about 8 tries before they are finally able to fully quit. So, the more often you try and fail, the better your chance of quitting the next time! So, don't give up!    GO COLD TURKEY:  Most ex-smokers quit cold turkey. Trying to cut back gradually doesn't seem to work as well, perhaps because it continues the smoking habit. Also, it is possible to fool yourself by inhaling more while smoking fewer cigarettes. This results in the same amount of nicotine in your body!    GET SUPPORT:  Support programs can make an important difference, especially for the heavy smoker. These groups offer lectures, methods to change your behavior and peer support. Call the free national Quitline for more information. 800-QUIT-NOW (096-643-4725). Low-cost or free programs are offered by many hospitals, local chapters of the American Lung Association (529-821-2536) and the American Cancer Society (814-563-2024). Support at home is important too. Non-smokers can help by offering praise and encouragement. If the smoker fails to quit, encourage them to try  January 4, 2024     Patient: Buzz Elmore   YOB: 1960   Date of Visit: 1/4/2024       To Whom It May Concern:    It is my medical opinion that Buzz Elmore may return to work/school on 1/10/24 if symptoms improved and fever free            Sincerely,    Racquel MEHTA    URGENT CARE VIDEO VISIT PROVIDER    CC: No Recipients         again!    OVER-THE-COUNTER MEDICINES:  For those who can't quit on their own, Nicotine Replacement Therapy (NRT) may make quitting much easier. Certain aids such as the nicotine patch, gum and lozenge are available without a prescription. However, it is best to use these under the guidance of your doctor. The skin patch provides a steady supply of nicotine to the body. Nicotine gum and lozenge gives temporary bursts of low levels of nicotine. Both methods take the edge off the craving for cigarettes. WARNING: If you feel symptoms of nicotine overdose, such as nausea, vomiting, dizziness, weakness, or fast heartbeat, stop using these and see your doctor.    PRESCRIPTION MEDICINES:  After evaluating your smoking patterns and prior attempts at quitting, your doctor may offer a prescription medicine such as bupropion (Zyban, Wellbutrin), varenicline (Chantix, Champix), a niocotine inhaler or nasal spray. Each has its unique advantage and side effects which your doctor can review with you.    HEALTH BENEFITS OF QUITTING:  The benefits of quitting start right away and keep improving the longer you go without smokin minutes: blood pressure and pulse return to normal  8 hours: oxygen levels return to normal  2 days: ability to smell and taste begins to improve as damaged nerves start to regrow  2-3 weeks: circulation and lung function improves  1-9 months: decreased cough, congestion and shortness of breath; less tired  1 year: risk of heart attack decreases by half  5 years: risk of lung cancer decreases by half; risk of stroke becomes the same as a non-smoker  For information about how to quit smoking, visit the following links:  National Cancer [employer] ,   Clearing the Air, Quit Smoking Today   - an online booklet. http://www.smokefree.gov/pubs/clearing_the_air.pdf  Smokefree.gov http://smokefree.gov/  QuitNet http://www.quitnet.com/    9143-1770 [address], [address] [facility] [address] [facility], [place] [address]. All  rights reserved. This information is not intended as a substitute for professional medical care. Always follow your healthcare professional's instructions.    The Benefits of Living Smoke Free  What do you want to gain from quitting? Check off some reasons to quit.  Health Benefits  ___ Reduce my risk of lung cancer, heart disease, chronic lung disease  ___ Have fewer wrinkles and softer skin  ___ Improve my sense of taste and smell  ___ For pregnant women--reduce the risk of having a miscarriage, stillbirth, premature birth, or low-birth-weight baby  Personal Benefits  ___ Feel more in control of my life  ___ Have better-smelling hair, breath, clothes, home, and car  ___ Save time by not having to take smoke breaks, buy cigarettes, or hunt for a light  ___ Have whiter teeth  Family Benefits  ___ Reduce my children s respiratory tract infections  ___ Set a good example for my children  ___ Reduce my family s cancer risk  Financial Benefits  ___ Save hundreds of dollars each year that would be spent on cigarettes  ___ Save money on medical bills  ___ Save on life, health, and car insurance premiums    Those Dollars Add Up!  Cigarettes are expensive, and getting more expensive all the time. Do you realize how much money you are spending on cigarettes per year? What is the average amount you spend on a pack of cigarettes? What is the average number of packs that you smoke per day? Using your answers to these questions, fill in this formula to help you find out:  ($ _____ per pack) ×  ( _____ number of packs per day) × (365 days) =  $ _____ yearly cost of smoking  Besides tobacco, there are other costs, including extra cleaning bills and replacement costs for clothing and furniture; medical expenses for smoking-related illnesses; and higher health, life, and car insurance premiums.    Cigars and Pipes Count Too!  Cigars and pipes are also dangerous. So are smokeless (chewing) tobacco and snuff. All of these products  contain nicotine, a highly addictive substance that has harmful effects on your body. Quitting smoking means giving up all tobacco products.      3129-5100 [facility], [address] [place] [address], [place] [address]. All rights reserved. This information is not intended as a substitute for professional medical care. Always follow your healthcare professional's instructions.  Preventive Health Recommendations  Male Ages 50 - 64    Yearly exam:             See your health care provider every year in order to  o   Review health changes.   o   Discuss preventive care.    o   Review your medicines if your doctor has prescribed any.     Have a cholesterol test every 5 years, or more frequently if you are at risk for high cholesterol/heart disease.     Have a diabetes test (fasting glucose) every three years. If you are at risk for diabetes, you should have this test more often.     Have a colonoscopy at age 50, or have a yearly FIT test (stool test). These exams will check for colon cancer.      Talk with your health care provider about whether or not a prostate cancer screening test (PSA) is right for you.    You should be tested each year for STDs (sexually transmitted diseases), if you re at risk.     Shots: Get a flu shot each year. Get a tetanus shot every 10 years.     Nutrition:    Eat at least 5 servings of fruits and vegetables daily.     Eat whole-grain bread, whole-wheat pasta and brown rice instead of white grains and rice.     Talk to your provider about Calcium and Vitamin D.     Lifestyle    Exercise for at least 150 minutes a week (30 minutes a day, 5 days a week). This will help you control your weight and prevent disease.     Limit alcohol to one drink per day.     No smoking.     Wear sunscreen to prevent skin cancer.     See your dentist every six months for an exam and cleaning.     See your eye doctor every 1 to 2 years.            Follow-ups after your visit        Who to contact     If you have  "questions or need follow up information about today's clinic visit or your schedule please contact [facility] directly at [phone].  Normal or non-critical lab and imaging results will be communicated to you by MyChart, letter or phone within 4 business days after the clinic has received the results. If you do not hear from us within 7 days, please contact the clinic through [employer] or phone. If you have a critical or abnormal lab result, we will notify you by phone as soon as possible.  Submit refill requests through [employer] or call your pharmacy and they will forward the refill request to us. Please allow 3 business days for your refill to be completed.          Additional Information About Your Visit        [facility] Information     [employer] gives you secure access to your electronic health record. If you see a primary care provider, you can also send messages to your care team and make appointments. If you have questions, please call your primary care clinic.  If you do not have a primary care provider, please call [phone] and they will assist you.        Care EveryWhere ID     This is your Care EveryWhere ID. This could be used by other organizations to access your [facility] medical records  [id number]        Your Vitals Were     Pulse Temperature Height Pulse Oximetry BMI (Body Mass Index)       66 97.5  F (36.4  C) 5' 11\" (1.803 m) 97% 36.26 kg/m2        Blood Pressure from Last 3 Encounters:   04/03/18 110/80   03/30/17 122/70   01/14/16 138/78    Weight from Last 3 Encounters:   04/03/18 260 lb (117.9 kg)   03/30/17 248 lb (112.5 kg)   01/14/16 266 lb (120.7 kg)              We Performed the Following     1st  Administration  [10042]     TDAP VACCINE (ADACEL) [61515.002]     Tobacco Cessation - Order to Satisfy Health Maintenance          Today's Medication Changes          These changes are accurate as of 4/3/18  9:43 AM.  If you have any questions, ask your nurse or doctor.          "      Start taking these medicines.        Dose/Directions    triamcinolone 0.1 % cream   Commonly known as:  KENALOG   Used for:  Obstructive sleep apnea syndrome, Contact dermatitis, unspecified contact dermatitis type, unspecified trigger   Started by:  Carlos Loving MD        Apply sparingly to affected area three times daily as needed   Quantity:  80 g   Refills:  0            Where to get your medicines      These medications were sent to [facility] [place] - [address]  [address] [facility] [place] [address]     Phone:  [phone]     triamcinolone 0.1 % cream         Some of these will need a paper prescription and others can be bought over the counter.  Ask your nurse if you have questions.     Bring a paper prescription for each of these medications     order for DME                Primary Care Provider Office Phone # Fax #    Carlos Loving [phone]       [address] [place] [address] E  [facility] [place] [address]        Equal Access to Services     [facility]: Hadii aad ku hadasho Soomaali, waaxda luqadaha, qaybta kaalmada adeegyada, waxay idiin hayaan adeeg kharash la'aan . So [facility] [phone].    ATENCIÓN: Si habla español, tiene a teresa disposición servicios gratuitos de asistencia lingüística. Llame al [phone].    We comply with applicable federal civil rights laws and [place] laws. We do not discriminate on the basis of race, color, national origin, age, disability, sex, sexual orientation, or gender identity.            Thank you!     Thank you for choosing [facility]  for your care. Our goal is always to provide you with excellent care. Hearing back from our patients is one way we can continue to improve our services. Please take a few minutes to complete the written survey that you may receive in the mail after your visit with us. Thank you!             Your Updated Medication List - Protect others around you: Learn how to safely use, store and  throw away your medicines at www.disposemymeds.org.          This list is accurate as of 4/3/18  9:43 AM.  Always use your most recent med list.                   Brand Name Dispense Instructions for use Diagnosis    aspirin 81 MG tablet      Take 81 mg by mouth daily        hydrochlorothiazide 25 MG tablet    HYDRODIURIL    90 tablet    TAKE 1 TABLET DAILY    Essential hypertension       MULTIVITAMIN PO      Take by mouth daily        order for DME     90 days    Equipment being ordered: CPAP supplies as directed    Obstructive sleep apnea syndrome       triamcinolone 0.1 % cream    KENALOG    80 g    Apply sparingly to affected area three times daily as needed    Obstructive sleep apnea syndrome, Contact dermatitis, unspecified contact dermatitis type, unspecified trigger

## 2024-01-05 RX ORDER — BENZONATATE 100 MG/1
CAPSULE ORAL
Qty: 30 CAPSULE | Refills: 0 | Status: SHIPPED | OUTPATIENT
Start: 2024-01-05

## 2024-01-05 NOTE — PROGRESS NOTES
You have chosen to receive care through a telehealth visit.  Do you consent to use a video/audio connection for your medical care today? Yes     CHIEF COMPLAINT  Chief Complaint   Patient presents with    Cough         HPI  Buzz Elmore is a 63 y.o. male  presents with complaint of tested positive for COVID. Reports his symptoms started this am. + cough, chills and runny nose. Low grade fever 100.. Covid at home test + today. Reports no nausea or vomiting. No CP or SOA. Reports he has taken some cough syrup and tylenol for his symptoms.  Is requesting Paxlovid. Reports he was exposed to his son with COVID     Review of Systems   Constitutional:  Positive for chills and fever. Negative for fatigue.   HENT:  Positive for congestion, rhinorrhea, sinus pressure, sinus pain and sore throat. Negative for ear discharge and ear pain.    Respiratory:  Positive for cough. Negative for chest tightness, shortness of breath and wheezing.    Cardiovascular:  Negative for chest pain.   Gastrointestinal:  Negative for abdominal pain, diarrhea, nausea and vomiting.   Musculoskeletal:  Positive for myalgias. Negative for back pain.   Neurological:  Negative for dizziness and headaches.   Psychiatric/Behavioral: Negative.         Past Medical History:   Diagnosis Date    Abdominal wall cellulitis 03/11/2021    Arthritis     Chronic low back pain     Coagulation disorder 06/20/2016    Coronary artery disease     GERD (gastroesophageal reflux disease)     Hyperlipidemia     Hypertension     IHD (ischemic heart disease)     IHD (ischemic heart disease)     Knee swelling     Obesity     Tear of meniscus of knee     Tendinitis of knee     Type 2 diabetes mellitus without complication, without long-term current use of insulin 06/20/2016       Family History   Problem Relation Age of Onset    Breast cancer Mother     Heart disease Mother     Hypertension Mother     Heart attack Mother     Hyperlipidemia Mother     Diabetes Mother      Obesity Mother     Cancer Mother     Colon cancer Father         diagnosed in his 60s    Hypertension Father     Heart attack Father     Diabetes Father     Obesity Father     Colon cancer Sister 58    No Known Problems Sister     Heart disease Brother     No Known Problems Brother     Heart disease Paternal Grandfather        Social History     Socioeconomic History    Marital status:    Tobacco Use    Smoking status: Never     Passive exposure: Never    Smokeless tobacco: Never   Vaping Use    Vaping Use: Never used   Substance and Sexual Activity    Alcohol use: Never    Drug use: Never    Sexual activity: Yes     Partners: Female     Birth control/protection: None, Natural family planning/Rhythm       Buzz Elmore  reports that he has never smoked. He has never been exposed to tobacco smoke. He has never used smokeless tobacco.. I have educated him on the risk of diseases from using tobacco products such as cancer, COPD, and heart disease.       I spent  1  minutes counseling the patient.              There were no vitals taken for this visit.    PHYSICAL EXAM  Physical Exam   Constitutional: He is oriented to person, place, and time. He appears well-developed and well-nourished. No distress.   HENT:   Head: Normocephalic and atraumatic.   Right Ear: Hearing normal.   Left Ear: Hearing normal.   Nose: Rhinorrhea and congestion present. Right sinus exhibits maxillary sinus tenderness. Left sinus exhibits maxillary sinus tenderness.   Mouth/Throat: Mouth/Lips are normal.  Patient directed exam   Eyes: Conjunctivae and lids are normal.   Pulmonary/Chest: Effort normal.  No respiratory distress.  Neurological: He is alert and oriented to person, place, and time.   Psychiatric: He has a normal mood and affect. His speech is normal and behavior is normal.       Results for orders placed or performed in visit on 12/11/23   CBC (No Diff)    Specimen: Blood   Result Value Ref Range    WBC 7.84 3.40 - 10.80  10*3/mm3    RBC 5.14 4.14 - 5.80 10*6/mm3    Hemoglobin 14.5 13.0 - 17.7 g/dL    Hematocrit 44.6 37.5 - 51.0 %    MCV 86.8 79.0 - 97.0 fL    MCH 28.2 26.6 - 33.0 pg    MCHC 32.5 31.5 - 35.7 g/dL    RDW 13.0 12.3 - 15.4 %    RDW-SD 39.5 37.0 - 54.0 fl    MPV 10.1 6.0 - 12.0 fL    Platelets 195 140 - 450 10*3/mm3   Comprehensive Metabolic Panel    Specimen: Blood   Result Value Ref Range    Glucose 88 65 - 99 mg/dL    BUN 17 8 - 23 mg/dL    Creatinine 1.33 (H) 0.76 - 1.27 mg/dL    Sodium 139 136 - 145 mmol/L    Potassium 4.1 3.5 - 5.2 mmol/L    Chloride 103 98 - 107 mmol/L    CO2 25.0 22.0 - 29.0 mmol/L    Calcium 9.8 8.6 - 10.5 mg/dL    Total Protein 7.1 6.0 - 8.5 g/dL    Albumin 4.5 3.5 - 5.2 g/dL    ALT (SGPT) 27 1 - 41 U/L    AST (SGOT) 27 1 - 40 U/L    Alkaline Phosphatase 56 39 - 117 U/L    Total Bilirubin 0.5 0.0 - 1.2 mg/dL    Globulin 2.6 gm/dL    A/G Ratio 1.7 g/dL    BUN/Creatinine Ratio 12.8 7.0 - 25.0    Anion Gap 11.0 5.0 - 15.0 mmol/L    eGFR 60.8 >60.0 mL/min/1.73   Vitamin D,25-Hydroxy    Specimen: Blood   Result Value Ref Range    25 Hydroxy, Vitamin D 32.0 30.0 - 100.0 ng/ml   PSA Screen    Specimen: Blood   Result Value Ref Range    PSA 0.961 0.000 - 4.000 ng/mL       Diagnoses and all orders for this visit:    1. COVID-19 (Primary)    Other orders  -     Nirmatrelvir & Ritonavir, 300mg/100mg, (PAXLOVID) 20 x 150 MG & 10 x 100MG tablet therapy pack tablet; Take 3 tablets by mouth 2 (Two) Times a Day for 5 days. Indications: COVID-19 Confirmed Infection  Dispense: 30 tablet; Refill: 0  -     benzonatate (Tessalon Perles) 100 MG capsule; Take 1 or 2 tid prn cough  Dispense: 30 capsule; Refill: 0    Rest  Fluids  Discussed Paxlovid. EUA discussed. Benefits. Reviewed eGFR 60.8. discussed with patient regarding medications that interact with Paxlovid. Patient agrees to hold rosuvastatin, tamsulosin and flonase while taking paxlovid and 3 days after last dose of paxlovid. Patient denies having urinary  obstruction with enlarged prostate. Reports he stopped flomax after knee surgery for 2 weeks without any issues.   PCP if symptoms continue   ER for any worsening symptoms such as high fever, chest pain or SOA       FOLLOW-UP  As discussed during visit with PCP/[facility] if no improvement or Urgent Care/Emergency Department if worsening of symptoms    Patient verbalizes understanding of medication dosage, comfort measures, instructions for treatment and follow-up.    Racquel Hunter, APRN  01/04/2024  22:56 EST    The use of a video visit has been reviewed with the patient and verbal informed consent has been obtained. Myself and Buzz Elmore participated in this visit. The patient is located in [address] [address].    I am located in [place], [place]. Mobile System 7 and [employer] for [employer] were utilized. I spent 5 minutes in the patient's chart for this visit.

## 2024-01-05 NOTE — PATIENT INSTRUCTIONS
Cough, Adult  Coughing is a reflex that clears your throat and your airways (respiratory system). Coughing helps to heal and protect your lungs. It is normal to cough occasionally, but a cough that happens with other symptoms or lasts a long time may be a sign of a condition that needs treatment. An acute cough may only last 2-3 weeks, while a chronic cough may last 8 or more weeks.  Coughing is commonly caused by:  Infection of the respiratory systemby viruses or bacteria.  Breathing in substances that irritate your lungs.  Allergies.  Asthma.  Mucus that runs down the back of your throat (postnasal drip).  Smoking.  Acid backing up from the stomach into the esophagus (gastroesophageal reflux).  Certain medicines.  Chronic lung problems.  Other medical conditions such as heart failure or a blood clot in the lung (pulmonary embolism).  Follow these instructions at home:  Medicines  Take over-the-counter and prescription medicines only as told by your health care provider.  Talk with your health care provider before you take a cough suppressant medicine.  Lifestyle  Avoid cigarette smoke. Do not use any products that contain nicotine or tobacco, such as cigarettes, e-cigarettes, and chewing tobacco. If you need help quitting, ask your health care provider.  Drink enough fluid to keep your urine pale yellow.  Avoid caffeine.  Do not drink alcohol if your health care provider tells you not to drink.  General instructions    Pay close attention to changes in your cough. Tell your health care provider about them.  Always cover your mouth when you cough.  Avoid things that make you cough, such as perfume, candles, cleaning products, or campfire or tobacco smoke.  If the air is dry, use a cool mist vaporizer or humidifier in your bedroom or your home to help loosen secretions.  If your cough is worse at night, try to sleep in a semi-upright position.  Rest as needed.  Keep all follow-up visits as told by your health care  provider. This is important.  Contact a health care provider if you:  Have new symptoms.  Cough up pus.  Have a cough that does not get better after 2-3 weeks or gets worse.  Cannot control your cough with cough suppressant medicines and you are losing sleep.  Have pain that gets worse or pain that is not helped with medicine.  Have a fever.  Have unexplained weight loss.  Have night sweats.  Get help right away if:  You cough up blood.  You have difficulty breathing.  Your heartbeat is very fast.  These symptoms may represent a serious problem that is an emergency. Do not wait to see if the symptoms will go away. Get medical help right away. Call your local emergency services (911 in the [place] Do not drive yourself to the hospital.  Summary  Coughing is a reflex that clears your throat and your airways. It is normal to cough occasionally, but a cough that happens with other symptoms or lasts a long time may be a sign of a condition that needs treatment.  Take over-the-counter and prescription medicines only as told by your health care provider.  Always cover your mouth when you cough.  Contact a health care provider if you have new symptoms or a cough that does not get better after 2-3 weeks or gets worse.  This information is not intended to replace advice given to you by your health care provider. Make sure you discuss any questions you have with your health care provider.  Document Revised: 05/23/2023 Document Reviewed: 01/06/2020  [employer] Patient Education © 2023 [employer]  How to Quarantine at Home  Information for Patients and Families    These instructions are for people with confirmed or suspected COVID-19 who do not need to be hospitalized and those with confirmed COVID-19 who were hospitalized and discharged to care for themselves at home.    If you were tested through the Health Department  The Health Department will monitor your wellbeing.  If it is determined that you do not need to be  hospitalized and can be isolated at home, you will be monitored by staff from your local or state health department.     If you were tested through a Commercial Lab  You will need to monitor yourself and report changes in your symptoms to your doctor.  See the section below called Monitor Your Symptoms.    Follow these steps until a healthcare provider or local or state health department says you can return to your normal activities.    Stay home except to get medical care  Restrict activities outside your home, except for getting medical care.   Do not go to work, school, or public areas.   Avoid using public transportation, ride-sharing, or taxis.    Separate yourself from other people and animals in your home  People  As much as possible, you should stay in a specific room and away from other people in your home. Also, you should use a separate bathroom, if available.    Animals  You should restrict contact with pets and other animals while you are sick with COVID-19, just like you would around other people. When possible, have another member of your household care for your animals while you are sick. If you are sick with COVID-19, avoid contact with your pet, including petting, snuggling, being kissed or licked, and sharing food. If you must care for your pet or be around animals while you are sick, wash your hands before and after you interact with pets and wear a facemask. See COVID-19 and Animals for more information.    Call ahead before visiting your doctor  If you have a medical appointment, call the healthcare provider and tell them that you have or may have COVID-19. This information will help the healthcare provider’s office take steps to keep other people from getting infected or exposed.    Wear a facemask  You should wear a facemask when you are around other people (e.g., sharing a room or vehicle) or pets and before you enter a healthcare provider’s office.     If you are not able to wear a facemask  (for example, because it causes trouble breathing), then people who live with you should not stay in the same room with you, or they should wear a facemask if they enter your room.    Cover your coughs and sneezes  Cover your mouth and nose with a tissue when you cough or sneeze.   Throw used tissues in a lined trash can.   Immediately wash your hands with soap and water for at least 20 seconds or, if soap and water are not available, clean your hands with an alcohol-based hand  that contains at least 60% alcohol.    Clean your hands often  Wash your hands often with soap and water for at least 20 seconds, especially after blowing your nose, coughing, or sneezing; going to the bathroom; and before eating or preparing food.     If soap and water are not readily available, use an alcohol-based hand  with at least 60% alcohol, covering all surfaces of your hands and rubbing them together until they feel dry.    Soap and water are the best option if hands are visibly dirty. Avoid touching your eyes, nose, and mouth with unwashed hands.    Avoid sharing personal household items  You should not share dishes, drinking glasses, cups, eating utensils, towels, or bedding with other people or pets in your home.   After using these items, they should be washed thoroughly with soap and water.    Clean all “high-touch” surfaces everyday  High touch surfaces include counters, tabletops, doorknobs, bathroom fixtures, toilets, phones, keyboards, tablets, and bedside tables.   Also, clean any surfaces that may have blood, stool, or body fluids on them.   Use a household cleaning spray or wipe, according to the label instructions. Labels contain instructions for safe and effective use of the cleaning product, including precautions you should take when applying the product, such as wearing gloves and making sure you have good ventilation during use of the product.    Monitor your symptoms  Seek prompt medical  attention if your illness is worsening (e.g., difficulty breathing).   Before seeking care, call your healthcare provider and tell them that you have, or are being evaluated for, COVID-19.   Put on a facemask before you enter the facility.     These steps will help the healthcare provider’s office to keep other people in the office or waiting room from getting infected or exposed.   Persons who are placed under active monitoring or facilitated self-monitoring should follow instructions provided by their local health department or occupational health professionals, as appropriate.  If you have a medical emergency and need to call 911, notify the dispatch personnel that you have, or are being evaluated for COVID-19. If possible, put on a facemask before emergency medical services arrive.    Discontinuing home isolation  Patients with confirmed COVID-19 should remain under home isolation precautions until the risk of secondary transmission to others is thought to be low. The decision to discontinue home isolation precautions should be made on a case-by-case basis, in consultation with healthcare providers and state and local health departments.    The below content are for household members, intimate partners, and caregivers of a patient with symptomatic laboratory-confirmed COVID-19 or a patient under investigation:    Household members, intimate partners, and caregivers may have close contact with a person with symptomatic, laboratory-confirmed COVID-19 or a person under investigation.     Close contacts should monitor their health; they should call their healthcare provider right away if they develop symptoms suggestive of COVID-19 (e.g., fever, cough, shortness of breath)     Close contacts should also follow these recommendations:  Make sure that you understand and can help the patient follow their healthcare provider’s instructions for medication(s) and care. You should help the patient with basic needs in the  home and provide support for getting groceries, prescriptions, and other personal needs.  Monitor the patient’s symptoms. If the patient is getting sicker, call his or her healthcare provider and tell them that the patient has laboratory-confirmed COVID-19. This will help the healthcare provider’s office take steps to keep other people in the office or waiting room from getting infected. Ask the healthcare provider to call the local or [facility] health department for additional guidance. If the patient has a medical emergency and you need to call 911, notify the dispatch personnel that the patient has, or is being evaluated for COVID-19.  Household members should stay in another room or be  from the patient as much as possible. Household members should use a separate bedroom and bathroom, if available.  Prohibit visitors who do not have an essential need to be in the home.  Household members should care for any pets in the home. Do not handle pets or other animals while sick.  For more information, see COVID-19 and Animals.  Make sure that shared spaces in the home have good air flow, such as by an air conditioner or an opened window, weather permitting.  Perform hand hygiene frequently. Wash your hands often with soap and water for at least 20 seconds or use an alcohol-based hand  that contains 60 to 95% alcohol, covering all surfaces of your hands and rubbing them together until they feel dry. Soap and water should be used preferentially if hands are visibly dirty.  Avoid touching your eyes, nose, and mouth with unwashed hands.  The patient should wear a facemask when you are around other people. If the patient is not able to wear a facemask (for example, because it causes trouble breathing), you, as the caregiver, should wear a mask when you are in the same room as the patient.  Wear a disposable facemask and gloves when you touch or have contact with the patient’s blood, stool, or body fluids, such  as saliva, sputum, nasal mucus, vomit, or urine.   Throw out disposable facemasks and gloves after using them. Do not reuse.  When removing personal protective equipment, first remove and dispose of gloves. Then, immediately clean your hands with soap and water or alcohol-based hand . Next, remove and dispose of facemask, and immediately clean your hands again with soap and water or alcohol-based hand .  Avoid sharing household items with the patient. You should not share dishes, drinking glasses, cups, eating utensils, towels, bedding, or other items. After the patient uses these items, you should wash them thoroughly (see below “Wash laundry thoroughly”).  Clean all “high-touch” surfaces, such as counters, tabletops, doorknobs, bathroom fixtures, toilets, phones, keyboards, tablets, and bedside tables, every day. Also, clean any surfaces that may have blood, stool, or body fluids on them.   Use a household cleaning spray or wipe, according to the label instructions. Labels contain instructions for safe and effective use of the cleaning product including precautions you should take when applying the product, such as wearing gloves and making sure you have good ventilation during use of the product.  Wash laundry thoroughly.   Immediately remove and wash clothes or bedding that have blood, stool, or body fluids on them.  Wear disposable gloves while handling soiled items and keep soiled items away from your body. Clean your hands (with soap and water or an alcohol-based hand ) immediately after removing your gloves.  Read and follow directions on labels of laundry or clothing items and detergent. In general, using a normal laundry detergent according to washing machine instructions and dry thoroughly using the warmest temperatures recommended on the clothing label.  Place all used disposable gloves, facemasks, and other contaminated items in a lined container before disposing of them with  other household waste. Clean your hands (with soap and water or an alcohol-based hand ) immediately after handling these items. Soap and water should be used preferentially if hands are visibly dirty.  Discuss any additional questions with your state or local health department or healthcare provider.    Adapted from information provided by the Centers for Disease Control and Prevention.  For more information, visit https://www.cdc.gov/coronavirus/2019-ncov/hcp/guidance-prevent-spread.html

## 2024-01-18 ENCOUNTER — OFFICE VISIT (OUTPATIENT)
Dept: INTERNAL MEDICINE | Facility: CLINIC | Age: 64
End: 2024-01-18
Payer: COMMERCIAL

## 2024-01-18 VITALS
RESPIRATION RATE: 20 BRPM | OXYGEN SATURATION: 97 % | HEART RATE: 68 BPM | BODY MASS INDEX: 34.14 KG/M2 | DIASTOLIC BLOOD PRESSURE: 98 MMHG | TEMPERATURE: 97.6 F | SYSTOLIC BLOOD PRESSURE: 158 MMHG | WEIGHT: 257.6 LBS | HEIGHT: 73 IN

## 2024-01-18 DIAGNOSIS — Q74.0 ASYMMETRY OF CLAVICLES: Primary | ICD-10-CM

## 2024-01-18 DIAGNOSIS — L03.313 CELLULITIS OF CHEST WALL: ICD-10-CM

## 2024-01-18 PROCEDURE — 99213 OFFICE O/P EST LOW 20 MIN: CPT | Performed by: INTERNAL MEDICINE

## 2024-01-18 NOTE — PROGRESS NOTES
Internal Medicine Follow Up    Chief Complaint  Buzz Elmore is a 63 y.o. male who presents today for follow up of chronic medical conditions outlined below.    Chief Complaint   Patient presents with    Cyst     On collar bone         HPI  Mr. Elmore comes in today for follow up after  visit. He went to  with swelling and redness from the R collarbone extending down into the upper R chest. The area was also warm to touch. He was given rocephin 1g and started on bactrim x10d. He notes significant improvement in redness, warmth, and swelling. Some residual swelling and redness directly over R clavicle. He has several days of antibiotics remaining. No fever, chills, or sweats.    Cyst         Review of Systems  Review of Systems   Constitutional: Negative.    Skin:  Positive for color change.        Current Medications  Current Outpatient Medications on File Prior to Visit   Medication Sig Dispense Refill    aspirin 81 MG tablet Take 1 tablet by mouth Daily.      benzonatate (Tessalon Perles) 100 MG capsule Take 1 or 2 tid prn cough 30 capsule 0    carvedilol (COREG) 12.5 MG tablet TAKE ONE TABLET BY MOUTH TWICE A DAY WITH MEALS 60 tablet 11    Cholecalciferol (VITAMIN D) 2000 UNITS capsule Take 1 capsule by mouth Daily.      fenofibrate (TRICOR) 48 MG tablet Daily.      fluticasone (FLONASE) 50 MCG/ACT nasal spray 2 sprays by Each Nare route Daily. 48 mL 1    meloxicam (MOBIC) 15 MG tablet TAKE 1 TABLET BY MOUTH DAILY 90 tablet 0    pantoprazole (PROTONIX) 40 MG EC tablet TAKE 1 TABLET BY MOUTH DAILY 90 tablet 1    prasugrel (EFFIENT) 10 MG tablet Take 1 tablet by mouth Daily. 90 tablet 3    rosuvastatin (CRESTOR) 40 MG tablet TAKE ONE TABLET BY MOUTH DAILY 90 tablet 3    Semaglutide, 1 MG/DOSE, (Ozempic, 1 MG/DOSE,) 4 MG/3ML solution pen-injector Inject 1 mg under the skin into the appropriate area as directed 1 (One) Time Per Week. 9 mL 1    sulfamethoxazole-trimethoprim (Bactrim DS) 800-160 MG per  "tablet Take 1 tablet by mouth 2 (Two) Times a Day. 20 tablet 0    tamsulosin (FLOMAX) 0.4 MG capsule 24 hr capsule Take 1 capsule by mouth Every Night. 30 capsule 0    telmisartan-hydrochlorothiazide (MICARDIS HCT) 80-25 MG per tablet TAKE ONE TABLET BY MOUTH DAILY 90 tablet 3     No current facility-administered medications on file prior to visit.       Allergies  Allergies   Allergen Reactions    Atorvastatin Myalgia       Objective  Visit Vitals  /98   Pulse 68   Temp 97.6 °F (36.4 °C)   Resp 20   Ht 185.4 cm (72.99\")   Wt 117 kg (257 lb 9.6 oz)   SpO2 97%   BMI 33.99 kg/m²        Physical Exam  Physical Exam  Vitals and nursing note reviewed.   Constitutional:       General: He is not in acute distress.     Appearance: He is well-developed. He is obese. He is not ill-appearing or toxic-appearing.   HENT:      Head: Normocephalic and atraumatic.   Eyes:      Conjunctiva/sclera: Conjunctivae normal.   Pulmonary:      Effort: Pulmonary effort is normal. No respiratory distress.   Skin:     General: Skin is warm and dry.      Comments: Area of localized swelling over R clavicle with no induration or fluctuance. Surrounding localized erythema. Skin dry overlying swelling. No open wound or drainage.   Neurological:      Mental Status: He is alert and oriented to person, place, and time. Mental status is at baseline.      Gait: Gait normal.   Psychiatric:         Mood and Affect: Mood normal.         Behavior: Behavior normal.         Results  Results for orders placed or performed in visit on 12/11/23   CBC (No Diff)    Specimen: Blood   Result Value Ref Range    WBC 7.84 3.40 - 10.80 10*3/mm3    RBC 5.14 4.14 - 5.80 10*6/mm3    Hemoglobin 14.5 13.0 - 17.7 g/dL    Hematocrit 44.6 37.5 - 51.0 %    MCV 86.8 79.0 - 97.0 fL    MCH 28.2 26.6 - 33.0 pg    MCHC 32.5 31.5 - 35.7 g/dL    RDW 13.0 12.3 - 15.4 %    RDW-SD 39.5 37.0 - 54.0 fl    MPV 10.1 6.0 - 12.0 fL    Platelets 195 140 - 450 10*3/mm3   Comprehensive " Metabolic Panel    Specimen: Blood   Result Value Ref Range    Glucose 88 65 - 99 mg/dL    BUN 17 8 - 23 mg/dL    Creatinine 1.33 (H) 0.76 - 1.27 mg/dL    Sodium 139 136 - 145 mmol/L    Potassium 4.1 3.5 - 5.2 mmol/L    Chloride 103 98 - 107 mmol/L    CO2 25.0 22.0 - 29.0 mmol/L    Calcium 9.8 8.6 - 10.5 mg/dL    Total Protein 7.1 6.0 - 8.5 g/dL    Albumin 4.5 3.5 - 5.2 g/dL    ALT (SGPT) 27 1 - 41 U/L    AST (SGOT) 27 1 - 40 U/L    Alkaline Phosphatase 56 39 - 117 U/L    Total Bilirubin 0.5 0.0 - 1.2 mg/dL    Globulin 2.6 gm/dL    A/G Ratio 1.7 g/dL    BUN/Creatinine Ratio 12.8 7.0 - 25.0    Anion Gap 11.0 5.0 - 15.0 mmol/L    eGFR 60.8 >60.0 mL/min/1.73   Vitamin D,25-Hydroxy    Specimen: Blood   Result Value Ref Range    25 Hydroxy, Vitamin D 32.0 30.0 - 100.0 ng/ml   PSA Screen    Specimen: Blood   Result Value Ref Range    PSA 0.961 0.000 - 4.000 ng/mL        Assessment and Plan  Diagnoses and all orders for this visit:    Asymmetry of clavicles  Cellulitis of chest wall  - seems to be improving with bactrim, complete course  - if not resolved by end of abx course he will message me and will consider extending duration of abx versus imaging of localized swelling     Health Maintenance  - Colonoscopy: 1/2023, repeat 2026  - HCV: negative  - Immunizations: flu and COVID discussed. Defers PCV20 and shingrix. Tdap 2021.  - Depression screening: negative 9/2023    Return for Next scheduled follow up.

## 2024-01-21 ENCOUNTER — PATIENT MESSAGE (OUTPATIENT)
Dept: INTERNAL MEDICINE | Facility: CLINIC | Age: 64
End: 2024-01-21
Payer: COMMERCIAL

## 2024-01-21 DIAGNOSIS — L03.313 CELLULITIS OF CHEST WALL: ICD-10-CM

## 2024-01-22 RX ORDER — SULFAMETHOXAZOLE AND TRIMETHOPRIM 800; 160 MG/1; MG/1
1 TABLET ORAL 2 TIMES DAILY
Qty: 10 TABLET | Refills: 0 | Status: SHIPPED | OUTPATIENT
Start: 2024-01-22 | End: 2024-01-27

## 2024-02-09 RX ORDER — FLUTICASONE PROPIONATE 50 MCG
2 SPRAY, SUSPENSION (ML) NASAL DAILY
Qty: 48 ML | Refills: 0 | Status: SHIPPED | OUTPATIENT
Start: 2024-02-09

## 2024-02-09 NOTE — TELEPHONE ENCOUNTER
Rx Refill Note  Requested Prescriptions     Pending Prescriptions Disp Refills    fluticasone (FLONASE) 50 MCG/ACT nasal spray [Pharmacy Med Name: FLUTICASONE PROP 50 MCG SPRAY] 48 mL 1     Sig: SPRAY TWO SPRAYS IN EACH NOSTRIL ONCE DAILY        Last office visit with prescribing clinician: 9/13/2023      Next office visit with prescribing clinician: 3/12/2024       Shante Cash (Jodi)  02/09/24, 12:25 EST

## 2024-02-12 ENCOUNTER — OFFICE VISIT (OUTPATIENT)
Dept: ORTHOPEDIC SURGERY | Facility: CLINIC | Age: 64
End: 2024-02-12
Payer: COMMERCIAL

## 2024-02-12 VITALS
DIASTOLIC BLOOD PRESSURE: 80 MMHG | WEIGHT: 257.94 LBS | BODY MASS INDEX: 34.19 KG/M2 | SYSTOLIC BLOOD PRESSURE: 144 MMHG | HEIGHT: 73 IN

## 2024-02-12 DIAGNOSIS — Z96.651 S/P TOTAL KNEE REPLACEMENT, RIGHT: Primary | ICD-10-CM

## 2024-02-12 DIAGNOSIS — M17.12 PRIMARY OSTEOARTHRITIS OF LEFT KNEE: ICD-10-CM

## 2024-02-12 DIAGNOSIS — Z09 POSTOPERATIVE EXAMINATION: ICD-10-CM

## 2024-02-12 PROCEDURE — 20610 DRAIN/INJ JOINT/BURSA W/O US: CPT | Performed by: ORTHOPAEDIC SURGERY

## 2024-02-12 PROCEDURE — 99213 OFFICE O/P EST LOW 20 MIN: CPT | Performed by: ORTHOPAEDIC SURGERY

## 2024-02-12 RX ORDER — AMOXICILLIN 500 MG/1
2000 TABLET, FILM COATED ORAL ONCE
Qty: 4 TABLET | Refills: 0 | Status: SHIPPED | OUTPATIENT
Start: 2024-02-12 | End: 2024-02-12

## 2024-02-12 RX ORDER — ROPIVACAINE HYDROCHLORIDE 5 MG/ML
4 INJECTION, SOLUTION EPIDURAL; INFILTRATION; PERINEURAL
Status: COMPLETED | OUTPATIENT
Start: 2024-02-12 | End: 2024-02-12

## 2024-02-12 RX ORDER — TRIAMCINOLONE ACETONIDE 40 MG/ML
40 INJECTION, SUSPENSION INTRA-ARTICULAR; INTRAMUSCULAR
Status: COMPLETED | OUTPATIENT
Start: 2024-02-12 | End: 2024-02-12

## 2024-02-12 RX ADMIN — TRIAMCINOLONE ACETONIDE 40 MG: 40 INJECTION, SUSPENSION INTRA-ARTICULAR; INTRAMUSCULAR at 08:22

## 2024-02-12 RX ADMIN — ROPIVACAINE HYDROCHLORIDE 4 ML: 5 INJECTION, SOLUTION EPIDURAL; INFILTRATION; PERINEURAL at 08:22

## 2024-02-15 RX ORDER — MELOXICAM 15 MG/1
15 TABLET ORAL DAILY
Qty: 90 TABLET | Refills: 0 | OUTPATIENT
Start: 2024-02-15

## 2024-02-22 DIAGNOSIS — M17.12 PRIMARY OSTEOARTHRITIS OF LEFT KNEE: Primary | ICD-10-CM

## 2024-02-22 NOTE — TELEPHONE ENCOUNTER
Pt sent [employer] message about pended Rx:    When I was in for last vist with you I talked with you that dr musa said you need be one to refill from now on  You said just let you know when ready for refill  So I requested a,refillcthe other day from you and i got a message I needed have visit with provider .  We have,already talked ab9ut at last visit  Thank you have a,good day

## 2024-02-23 RX ORDER — MELOXICAM 15 MG/1
15 TABLET ORAL DAILY
Qty: 90 TABLET | Refills: 0 | Status: SHIPPED | OUTPATIENT
Start: 2024-02-23

## 2024-03-12 ENCOUNTER — OFFICE VISIT (OUTPATIENT)
Dept: ENDOCRINOLOGY | Facility: CLINIC | Age: 64
End: 2024-03-12
Payer: COMMERCIAL

## 2024-03-12 VITALS
DIASTOLIC BLOOD PRESSURE: 80 MMHG | HEART RATE: 80 BPM | WEIGHT: 262.6 LBS | SYSTOLIC BLOOD PRESSURE: 146 MMHG | OXYGEN SATURATION: 98 % | BODY MASS INDEX: 35.57 KG/M2 | HEIGHT: 72 IN

## 2024-03-12 DIAGNOSIS — E11.9 TYPE 2 DIABETES MELLITUS WITHOUT COMPLICATION, WITHOUT LONG-TERM CURRENT USE OF INSULIN: Primary | ICD-10-CM

## 2024-03-12 DIAGNOSIS — E78.2 MIXED HYPERLIPIDEMIA: ICD-10-CM

## 2024-03-12 DIAGNOSIS — I10 PRIMARY HYPERTENSION: ICD-10-CM

## 2024-03-12 LAB
EXPIRATION DATE: ABNORMAL
EXPIRATION DATE: NORMAL
GLUCOSE BLDC GLUCOMTR-MCNC: 113 MG/DL (ref 70–130)
HBA1C MFR BLD: 6.1 % (ref 4.5–5.7)
Lab: ABNORMAL
Lab: NORMAL

## 2024-03-12 PROCEDURE — 83036 HEMOGLOBIN GLYCOSYLATED A1C: CPT | Performed by: INTERNAL MEDICINE

## 2024-03-12 PROCEDURE — 99214 OFFICE O/P EST MOD 30 MIN: CPT | Performed by: INTERNAL MEDICINE

## 2024-03-12 PROCEDURE — 82947 ASSAY GLUCOSE BLOOD QUANT: CPT | Performed by: INTERNAL MEDICINE

## 2024-03-12 RX ORDER — FLUTICASONE PROPIONATE 50 MCG
2 SPRAY, SUSPENSION (ML) NASAL DAILY
Qty: 48 ML | Refills: 5 | Status: SHIPPED | OUTPATIENT
Start: 2024-03-12

## 2024-03-12 RX ORDER — SEMAGLUTIDE 2.68 MG/ML
2 INJECTION, SOLUTION SUBCUTANEOUS WEEKLY
Qty: 3 ML | Refills: 3 | Status: SHIPPED | OUTPATIENT
Start: 2024-03-12

## 2024-03-12 NOTE — ASSESSMENT & PLAN NOTE
Blood sugar and 90 day average sugar reviewed  Results for orders placed or performed in visit on 03/12/24   POC Glycosylated Hemoglobin (Hb A1C)    Specimen: Blood   Result Value Ref Range    Hemoglobin A1C 6.1 (A) 4.5 - 5.7 %    Lot Number [id number]     Expiration Date 11/12/2025    POC Glucose, Blood    Specimen: Blood   Result Value Ref Range    Glucose 113 70 - 130 mg/dL    Lot Number [id number]     Expiration Date 10/05/2024      Average sugar is good- continue ozempic with titration   Is utd with preventive care  F/u 3-4 months

## 2024-03-12 NOTE — PROGRESS NOTES
Buzz Elmore 63 y.o.  [facility]:Follow-up, Diabetes (Type II, eye over one year ago), Hypertension, and Hyperlipidemia    [place]: Follow-up, Diabetes (Type II, eye over one year ago), Hypertension, and Hyperlipidemia    Blood sugar and 90 day average sugar reviewed  Results for orders placed or performed in visit on 03/12/24   POC Glycosylated Hemoglobin (Hb A1C)    Specimen: Blood   Result Value Ref Range    Hemoglobin A1C 6.1 (A) 4.5 - 5.7 %    Lot Number [id number]     Expiration Date 11/12/2025    POC Glucose, Blood    Specimen: Blood   Result Value Ref Range    Glucose 113 70 - 130 mg/dL    Lot Number [id number]     Expiration Date 10/05/2024      Average sugar is 120  Bp recheck improved  Is eating low fat diet - taking crestor 40 mg daily     Allergies   Allergen Reactions    Atorvastatin Myalgia       Current Outpatient Medications:     fluticasone (FLONASE) 50 MCG/ACT nasal spray, 2 sprays by Each Nare route Daily., Disp: 48 mL, Rfl: 5    aspirin 81 MG tablet, Take 1 tablet by mouth Daily., Disp: , Rfl:     carvedilol (COREG) 12.5 MG tablet, TAKE ONE TABLET BY MOUTH TWICE A DAY WITH MEALS, Disp: 60 tablet, Rfl: 11    cephalexin (KEFLEX) 500 MG capsule, Take 1 capsule by mouth 3 (Three) Times a Day for 30 doses., Disp: 30 capsule, Rfl: 0    Cholecalciferol (VITAMIN D) 2000 UNITS capsule, Take 1 capsule by mouth Daily., Disp: , Rfl:     fenofibrate (TRICOR) 48 MG tablet, Daily., Disp: , Rfl:     meloxicam (MOBIC) 15 MG tablet, Take 1 tablet by mouth Daily., Disp: 90 tablet, Rfl: 0    pantoprazole (PROTONIX) 40 MG EC tablet, TAKE 1 TABLET BY MOUTH DAILY, Disp: 90 tablet, Rfl: 1    prasugrel (EFFIENT) 10 MG tablet, Take 1 tablet by mouth Daily., Disp: 90 tablet, Rfl: 3    rosuvastatin (CRESTOR) 40 MG tablet, TAKE ONE TABLET BY MOUTH DAILY, Disp: 90 tablet, Rfl: 3    Semaglutide, 1 MG/DOSE, (Ozempic, 1 MG/DOSE,) 4 MG/3ML solution pen-injector, Inject 1 mg under the skin into the appropriate area as directed 1  (One) Time Per Week., Disp: 9 mL, Rfl: 1    Semaglutide, 2 MG/DOSE, (Ozempic, 2 MG/DOSE,) 8 MG/3ML solution pen-injector, Inject 2 mg under the skin into the appropriate area as directed 1 (One) Time Per Week., Disp: 3 mL, Rfl: 3    tamsulosin (FLOMAX) 0.4 MG capsule 24 hr capsule, Take 1 capsule by mouth Every Night., Disp: 30 capsule, Rfl: 0    telmisartan-hydrochlorothiazide (MICARDIS HCT) 80-25 MG per tablet, TAKE ONE TABLET BY MOUTH DAILY, Disp: 90 tablet, Rfl: 3  Patient Active Problem List    Diagnosis     Family history of colon cancer [Z80.0]     Primary osteoarthritis of left knee [M17.12]     S/P total knee replacement, right [Z96.651]     Degenerative arthritis of right knee [M17.11]     Colon polyps [K63.5]     IHD (ischemic heart disease) [I25.9]     Obesity [E66.9]     Chronic low back pain [M54.50, G89.29]     Coagulation disorder [D68.9]     Benign prostatic hyperplasia with urinary obstruction [N40.1, N13.8]     Mechanical complication due to tissue graft [T85.698A]     Atherosclerosis of coronary artery [I25.10]     Type 2 diabetes mellitus without complication, without long-term current use of insulin [E11.9]     Gastroesophageal reflux disease [K21.9]     Hyperlipidemia [E78.5]     Hypertension [I10]     Lipoma [D17.9]     Calculus of kidney [N20.0]     Vitamin D deficiency [E55.9]     Acute serous otitis media [H65.00]     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   HENT:  Negative for congestion and rhinorrhea.    Eyes:  Negative for visual disturbance.   Respiratory:  Negative for cough and shortness of breath.    Cardiovascular:  Negative for palpitations and leg swelling.   Gastrointestinal:  Negative for constipation, diarrhea and nausea.   Genitourinary:  Negative  "for hematuria.   Musculoskeletal:  Negative for arthralgias, back pain, gait problem, joint swelling and myalgias.   Skin:  Negative for color change, rash and wound.   Allergic/Immunologic: Negative for environmental allergies, food allergies and immunocompromised state.   Neurological:  Negative for dizziness, weakness and light-headedness.   Psychiatric/Behavioral:  Negative for confusion, decreased concentration, dysphoric mood and sleep disturbance. The patient is not nervous/anxious.      Social History     Socioeconomic History    Marital status:    Tobacco Use    Smoking status: Never     Passive exposure: Never    Smokeless tobacco: Never   Vaping Use    Vaping status: Never Used   Substance and Sexual Activity    Alcohol use: Never    Drug use: Never    Sexual activity: Yes     Partners: Female     Birth control/protection: None, Natural family planning/Rhythm     Family History   Problem Relation Age of Onset    Breast cancer Mother     Heart disease Mother     Hypertension Mother     Heart attack Mother     Hyperlipidemia Mother     Diabetes Mother     Obesity Mother     Cancer Mother     Colon cancer Father         diagnosed in his 60s    Hypertension Father     Heart attack Father     Diabetes Father     Obesity Father     Colon cancer Sister 58    No Known Problems Sister     Heart disease Brother     No Known Problems Brother     Heart disease Paternal Grandfather      /80   Pulse 80   Ht 182.9 cm (72\")   Wt 119 kg (262 lb 9.6 oz)   SpO2 98%   BMI 35.61 kg/m²   Physical Exam  Vitals and nursing note reviewed.   Constitutional:       Appearance: Normal appearance. He is well-developed.   HENT:      Head: Normocephalic and atraumatic.   Eyes:      General: Lids are normal.      Extraocular Movements: Extraocular movements intact.      Conjunctiva/sclera: Conjunctivae normal.      Pupils: Pupils are equal, round, and reactive to light.   Neck:      Thyroid: No thyroid mass or " thyromegaly.      Vascular: No carotid bruit.      Trachea: Trachea normal. No tracheal deviation.   Cardiovascular:      Rate and Rhythm: Normal rate and regular rhythm.      Heart sounds: Normal heart sounds. No murmur heard.     No friction rub. No gallop.   Pulmonary:      Effort: Pulmonary effort is normal. No respiratory distress.      Breath sounds: Normal breath sounds. No wheezing.   Musculoskeletal:         General: No deformity. Normal range of motion.      Cervical back: Normal range of motion and neck supple.   Lymphadenopathy:      Cervical: No cervical adenopathy.   Skin:     General: Skin is warm and dry.      Findings: Bruising and erythema present. No rash.      Nails: There is no clubbing.      Comments: Scalp sutures  Bruising bilateral arms    Neurological:      Mental Status: He is alert and oriented to person, place, and time.      Cranial Nerves: No cranial nerve deficit.      Deep Tendon Reflexes: Reflexes are normal and symmetric. Reflexes normal.   Psychiatric:         Speech: Speech normal.         Behavior: Behavior normal.         Thought Content: Thought content normal.         Judgment: Judgment normal.       Results for orders placed or performed in visit on 03/12/24   POC Glycosylated Hemoglobin (Hb A1C)    Specimen: Blood   Result Value Ref Range    Hemoglobin A1C 6.1 (A) 4.5 - 5.7 %    Lot Number [id number]     Expiration Date 11/12/2025    POC Glucose, Blood    Specimen: Blood   Result Value Ref Range    Glucose 113 70 - 130 mg/dL    Lot Number [id number]     Expiration Date 10/05/2024      Diagnoses and all orders for this visit:    1. Type 2 diabetes mellitus without complication, without long-term current use of insulin (Primary)  Assessment & Plan:  Blood sugar and 90 day average sugar reviewed  Results for orders placed or performed in visit on 03/12/24   POC Glycosylated Hemoglobin (Hb A1C)    Specimen: Blood   Result Value Ref Range    Hemoglobin A1C 6.1 (A) 4.5 - 5.7 %     Lot Number [id number]     Expiration Date 11/12/2025    POC Glucose, Blood    Specimen: Blood   Result Value Ref Range    Glucose 113 70 - 130 mg/dL    Lot Number [id number]     Expiration Date 10/05/2024      Average sugar is good- continue ozempic with titration   Is utd with preventive care  F/u 3-4 months     Orders:  -     POC Glycosylated Hemoglobin (Hb A1C)  -     POC Glucose, Blood    2. Primary hypertension  Assessment & Plan:  High but improved  Continue micardis and coreg  Recent stable cmp       3. Mixed hyperlipidemia  Assessment & Plan:   Is eating low fat diet, ldl at goal taking crestor and fenofibrate      Other orders  -     fluticasone (FLONASE) 50 MCG/ACT nasal spray; 2 sprays by Each Nare route Daily.  Dispense: 48 mL; Refill: 5  -     Semaglutide, 2 MG/DOSE, (Ozempic, 2 MG/DOSE,) 8 MG/3ML solution pen-injector; Inject 2 mg under the skin into the appropriate area as directed 1 (One) Time Per Week.  Dispense: 3 mL; Refill: 3    Much more active now- cautioned him about safe activity  Climbing trees with chainsaw and had building fall on him last night (to [facility] with sutures and bruising)  Return in about 4 months (around 7/12/2024) for Recheck.    Maida Rey MD  Signed Maida Rey MD

## 2024-03-25 ENCOUNTER — PATIENT ROUNDING (BHMG ONLY) (OUTPATIENT)
Dept: URGENT CARE | Facility: CLINIC | Age: 64
End: 2024-03-25
Payer: COMMERCIAL

## 2024-03-25 NOTE — ED NOTES
Thank you for letting us care for you in your recent visit to our urgent care center. We would love to hear about your experience with us. Was this the first time you have visited our location?    We’re always looking for ways to make our patients’ experiences even better. Do you have any recommendations on ways we may improve?     I appreciate you taking the time to respond. Please be on the lookout for a survey about your recent visit from [employer] via text or email. We would greatly appreciate if you could fill that out and turn it back in. We want your voice to be heard and we value your feedback.   Thank you for choosing [facility] for your healthcare needs.

## 2024-05-10 NOTE — PROGRESS NOTES
Subjective:     Encounter Date:05/13/2024    Primary Care Physician: Erlinda Villagomez MD      Patient ID: Buzz Elmore is a 63 y.o. male.    Chief Complaint:IHD (ischemic heart disease)    PROBLEM LIST:  Ischemic heart disease  LHC for ACS, 07/28/2011: PTCA/PCI to proximal RCA. EF 60%.  Stress Test, 12/02/2016: EF > 70%. No ischemia  MPS 09/17/2021: EF > 70%. Normal study.   Hyperlipidemia  Hypertension  Borderline DM  Chronic low back pain  GERD  Obesity  Surgeries:  Right TKR  Left hand surgery      Allergies   Allergen Reactions    Atorvastatin Myalgia         Current Outpatient Medications:     aspirin 81 MG tablet, Take 1 tablet by mouth Daily., Disp: , Rfl:     carvedilol (COREG) 12.5 MG tablet, TAKE ONE TABLET BY MOUTH TWICE A DAY WITH MEALS, Disp: 60 tablet, Rfl: 11    Cholecalciferol (VITAMIN D) 2000 UNITS capsule, Take 1 capsule by mouth Daily., Disp: , Rfl:     fenofibrate (TRICOR) 48 MG tablet, Daily., Disp: , Rfl:     fluticasone (FLONASE) 50 MCG/ACT nasal spray, 2 sprays by Each Nare route Daily. (Patient taking differently: 2 sprays by Each Nare route As Needed.), Disp: 48 mL, Rfl: 5    meloxicam (MOBIC) 15 MG tablet, Take 1 tablet by mouth Daily., Disp: 90 tablet, Rfl: 0    pantoprazole (PROTONIX) 40 MG EC tablet, TAKE 1 TABLET BY MOUTH DAILY, Disp: 90 tablet, Rfl: 1    prasugrel (EFFIENT) 10 MG tablet, Take 1 tablet by mouth Daily., Disp: 90 tablet, Rfl: 3    rosuvastatin (CRESTOR) 40 MG tablet, TAKE ONE TABLET BY MOUTH DAILY, Disp: 90 tablet, Rfl: 3    tamsulosin (FLOMAX) 0.4 MG capsule 24 hr capsule, Take 1 capsule by mouth Every Night., Disp: 30 capsule, Rfl: 0    Semaglutide, 2 MG/DOSE, (Ozempic, 2 MG/DOSE,) 8 MG/3ML solution pen-injector, Inject 2 mg under the skin into the appropriate area as directed 1 (One) Time Per Week. (Patient not taking: Reported on 5/13/2024), Disp: 3 mL, Rfl: 3    Tirzepatide (Mounjaro) 2.5 MG/0.5ML solution pen-injector pen, Inject 0.5 mL under the skin  "into the appropriate area as directed 1 (One) Time Per Week., Disp: , Rfl:         History of Present Illness    Patient is a 63-year-old  male who presents today for annual follow-up of coronary artery disease.  Patient notes overall doing well from cardiac standpoint.  However, recently notes that he was tearing down a building and this fell on him.  He suffered head laceration, shoulder laceration and fractured rib.  Other than his current musculoskeletal pain denies any anginal type symptoms.  Denies any increase shortness of breath.  No reported syncope, presyncope, or edema.    The following portions of the patient's history were reviewed and updated as appropriate: allergies, current medications, past family history, past medical history, past social history, past surgical history and problem list.      Social History     Tobacco Use    Smoking status: Never     Passive exposure: Never    Smokeless tobacco: Never   Vaping Use    Vaping status: Never Used   Substance Use Topics    Alcohol use: Never    Drug use: Never         ROS       Objective:   /82 (BP Location: Left arm, Patient Position: Sitting)   Pulse 78   Ht 185.4 cm (73\")   Wt 118 kg (261 lb)   SpO2 96%   BMI 34.43 kg/m²         Vitals reviewed.   Constitutional:       Appearance: Well-developed and not in distress.   Neck:      Vascular: No JVD.      Trachea: No tracheal deviation.   Pulmonary:      Effort: Pulmonary effort is normal.      Breath sounds: Normal breath sounds.   Cardiovascular:      Normal rate. Regular rhythm.      Murmurs: There is no murmur.   Edema:     Peripheral edema absent.   Musculoskeletal:         General: No deformity. Skin:     General: Skin is warm and dry.   Neurological:      Mental Status: Alert and oriented to person, place, and time.         Procedures          Assessment:   Assessment & Plan      Diagnoses and all orders for this visit:    1. Coronary artery disease involving native coronary " artery of native heart without angina pectoris (Primary), stable.  No anginal complaints.  On aspirin and Effient.    2. Hypertension, unspecified type, controlled.  On beta-blocker.    3. Dyslipidemia, stable.  On statin.  Most recent LDL was controlled.      Plan:  Patient is overall stable from cardiac standpoint.  Continue current cardiac medications.  Follow-up in 1 years time or sooner if needed.       Maki MEHTA             Dictated utilizing Dragon dictation

## 2024-05-13 ENCOUNTER — OFFICE VISIT (OUTPATIENT)
Dept: CARDIOLOGY | Facility: CLINIC | Age: 64
End: 2024-05-13
Payer: COMMERCIAL

## 2024-05-13 VITALS
HEIGHT: 73 IN | SYSTOLIC BLOOD PRESSURE: 126 MMHG | WEIGHT: 261 LBS | HEART RATE: 78 BPM | OXYGEN SATURATION: 96 % | DIASTOLIC BLOOD PRESSURE: 82 MMHG | BODY MASS INDEX: 34.59 KG/M2

## 2024-05-13 DIAGNOSIS — E78.5 DYSLIPIDEMIA: ICD-10-CM

## 2024-05-13 DIAGNOSIS — I25.10 CORONARY ARTERY DISEASE INVOLVING NATIVE CORONARY ARTERY OF NATIVE HEART WITHOUT ANGINA PECTORIS: Primary | ICD-10-CM

## 2024-05-13 DIAGNOSIS — I10 HYPERTENSION, UNSPECIFIED TYPE: ICD-10-CM

## 2024-05-13 PROCEDURE — 99214 OFFICE O/P EST MOD 30 MIN: CPT | Performed by: NURSE PRACTITIONER

## 2024-05-13 NOTE — LETTER
May 13, 2024       No Recipients    Patient: Buzz Elmore   YOB: 1960   Date of Visit: 5/13/2024     Dear Erlinda Villagomez MD:       Thank you for referring Buzz Elmore to me for evaluation. Below are the relevant portions of my assessment and plan of care.    If you have questions, please do not hesitate to call me. I look forward to following Buzz along with you.         Sincerely,        TYSON Evans        CC:   No Recipients    Maki Merchant APRN  05/13/24 1630  Sign when Signing Visit  Subjective:     Encounter Date:05/13/2024    Primary Care Physician: Erlinda Villagomez MD      Patient ID: Buzz Elmore is a 63 y.o. male.    Chief Complaint:IHD (ischemic heart disease)    PROBLEM LIST:  Ischemic heart disease  LHC for ACS, 07/28/2011: PTCA/PCI to proximal RCA. EF 60%.  Stress Test, 12/02/2016: EF > 70%. No ischemia  MPS 09/17/2021: EF > 70%. Normal study.   Hyperlipidemia  Hypertension  Borderline DM  Chronic low back pain  GERD  Obesity  Surgeries:  Right TKR  Left hand surgery      Allergies   Allergen Reactions   • Atorvastatin Myalgia         Current Outpatient Medications:   •  aspirin 81 MG tablet, Take 1 tablet by mouth Daily., Disp: , Rfl:   •  carvedilol (COREG) 12.5 MG tablet, TAKE ONE TABLET BY MOUTH TWICE A DAY WITH MEALS, Disp: 60 tablet, Rfl: 11  •  Cholecalciferol (VITAMIN D) 2000 UNITS capsule, Take 1 capsule by mouth Daily., Disp: , Rfl:   •  fenofibrate (TRICOR) 48 MG tablet, Daily., Disp: , Rfl:   •  fluticasone (FLONASE) 50 MCG/ACT nasal spray, 2 sprays by Each Nare route Daily. (Patient taking differently: 2 sprays by Each Nare route As Needed.), Disp: 48 mL, Rfl: 5  •  meloxicam (MOBIC) 15 MG tablet, Take 1 tablet by mouth Daily., Disp: 90 tablet, Rfl: 0  •  pantoprazole (PROTONIX) 40 MG EC tablet, TAKE 1 TABLET BY MOUTH DAILY, Disp: 90 tablet, Rfl: 1  •  prasugrel (EFFIENT) 10 MG tablet, Take 1 tablet by mouth Daily., Disp: 90  "tablet, Rfl: 3  •  rosuvastatin (CRESTOR) 40 MG tablet, TAKE ONE TABLET BY MOUTH DAILY, Disp: 90 tablet, Rfl: 3  •  tamsulosin (FLOMAX) 0.4 MG capsule 24 hr capsule, Take 1 capsule by mouth Every Night., Disp: 30 capsule, Rfl: 0  •  Semaglutide, 2 MG/DOSE, (Ozempic, 2 MG/DOSE,) 8 MG/3ML solution pen-injector, Inject 2 mg under the skin into the appropriate area as directed 1 (One) Time Per Week. (Patient not taking: Reported on 5/13/2024), Disp: 3 mL, Rfl: 3  •  Tirzepatide (Mounjaro) 2.5 MG/0.5ML solution pen-injector pen, Inject 0.5 mL under the skin into the appropriate area as directed 1 (One) Time Per Week., Disp: , Rfl:         History of Present Illness    Patient is a 63-year-old  male who presents today for annual follow-up of coronary artery disease.  Patient notes overall doing well from cardiac standpoint.  However, recently notes that he was tearing down a building and this fell on him.  He suffered head laceration, shoulder laceration and fractured rib.  Other than his current musculoskeletal pain denies any anginal type symptoms.  Denies any increase shortness of breath.  No reported syncope, presyncope, or edema.    The following portions of the patient's history were reviewed and updated as appropriate: allergies, current medications, past family history, past medical history, past social history, past surgical history and problem list.      Social History     Tobacco Use   • Smoking status: Never     Passive exposure: Never   • Smokeless tobacco: Never   Vaping Use   • Vaping status: Never Used   Substance Use Topics   • Alcohol use: Never   • Drug use: Never         ROS       Objective:   /82 (BP Location: Left arm, Patient Position: Sitting)   Pulse 78   Ht 185.4 cm (73\")   Wt 118 kg (261 lb)   SpO2 96%   BMI 34.43 kg/m²         Vitals reviewed.   Constitutional:       Appearance: Well-developed and not in distress.   Neck:      Vascular: No JVD.      Trachea: No tracheal " deviation.   Pulmonary:      Effort: Pulmonary effort is normal.      Breath sounds: Normal breath sounds.   Cardiovascular:      Normal rate. Regular rhythm.      Murmurs: There is no murmur.   Edema:     Peripheral edema absent.   Musculoskeletal:         General: No deformity. Skin:     General: Skin is warm and dry.   Neurological:      Mental Status: Alert and oriented to person, place, and time.         Procedures          Assessment:   Assessment & Plan     Diagnoses and all orders for this visit:    1. Coronary artery disease involving native coronary artery of native heart without angina pectoris (Primary), stable.  No anginal complaints.  On aspirin and Effient.    2. Hypertension, unspecified type, controlled.  On beta-blocker.    3. Dyslipidemia, stable.  On statin.  Most recent LDL was controlled.      Plan:  Patient is overall stable from cardiac standpoint.  Continue current cardiac medications.  Follow-up in 1 years time or sooner if needed.       Maki MEHTA             Dictated utilizing Dragon dictation

## 2024-05-23 DIAGNOSIS — I10 ESSENTIAL HYPERTENSION: ICD-10-CM

## 2024-05-23 RX ORDER — PANTOPRAZOLE SODIUM 40 MG/1
40 TABLET, DELAYED RELEASE ORAL DAILY
Qty: 90 TABLET | Refills: 1 | Status: SHIPPED | OUTPATIENT
Start: 2024-05-23

## 2024-05-23 RX ORDER — TELMISARTAN AND HYDROCHLORTHIAZIDE 80; 25 MG/1; MG/1
1 TABLET ORAL DAILY
Qty: 90 TABLET | Refills: 3 | OUTPATIENT
Start: 2024-05-23

## 2024-05-23 NOTE — TELEPHONE ENCOUNTER
Rx Refill Note    Requested Prescriptions     Pending Prescriptions Disp Refills    pantoprazole (PROTONIX) 40 MG EC tablet [Pharmacy Med Name: PANTOPRAZOLE SOD DR 40 MG TAB] 90 tablet 1     Sig: TAKE 1 TABLET BY MOUTH DAILY        Last office visit with prescribing clinician: 3/12/2024     Next office visit with prescribing clinician: 8/6/2024   {

## 2024-05-24 ENCOUNTER — TELEPHONE (OUTPATIENT)
Dept: INTERNAL MEDICINE | Facility: CLINIC | Age: 64
End: 2024-05-24
Payer: COMMERCIAL

## 2024-05-24 NOTE — TELEPHONE ENCOUNTER
Patients wife verbalized understanding on why his medication was denied and will reach out to the pharmacy to do get his refill.

## 2024-05-28 RX ORDER — TELMISARTAN AND HYDROCHLORTHIAZIDE 80; 25 MG/1; MG/1
1 TABLET ORAL DAILY
Qty: 30 TABLET | Refills: 11 | Status: SHIPPED | OUTPATIENT
Start: 2024-05-28 | End: 2025-05-28

## 2024-05-31 RX ORDER — SEMAGLUTIDE 2.68 MG/ML
2 INJECTION, SOLUTION SUBCUTANEOUS WEEKLY
Qty: 3 ML | Refills: 3 | Status: SHIPPED | OUTPATIENT
Start: 2024-05-31

## 2024-05-31 NOTE — TELEPHONE ENCOUNTER
Rx refill for ozempic to [facility]  Rx Refill Note    Requested Prescriptions     Pending Prescriptions Disp Refills    Semaglutide, 2 MG/DOSE, (Ozempic, 2 MG/DOSE,) 8 MG/3ML solution pen-injector 3 mL 3     Sig: Inject 2 mg under the skin into the appropriate area as directed 1 (One) Time Per Week.        Last office visit with prescribing clinician: 3/12/2024     Next office visit with prescribing clinician: 8/6/2024   {    Mayte Herrera MA  05/31/24, 11:26 EDT

## 2024-06-02 DIAGNOSIS — M17.12 PRIMARY OSTEOARTHRITIS OF LEFT KNEE: ICD-10-CM

## 2024-06-03 ENCOUNTER — TELEPHONE (OUTPATIENT)
Dept: INTERNAL MEDICINE | Facility: CLINIC | Age: 64
End: 2024-06-03
Payer: COMMERCIAL

## 2024-06-03 RX ORDER — MELOXICAM 15 MG/1
15 TABLET ORAL DAILY
Qty: 90 TABLET | Refills: 0 | OUTPATIENT
Start: 2024-06-03

## 2024-06-03 NOTE — TELEPHONE ENCOUNTER
Pt was filling out questionnaire for his appointment tomorrow morning and didn't know why he had an appointment. I explained it was a 6 month follow up and that his medications that he had requested a refill for would be refilled at said appointment. Wife verbalized understanding and he will be at his appointment tomorrow for the follow up.

## 2024-06-04 ENCOUNTER — OFFICE VISIT (OUTPATIENT)
Dept: INTERNAL MEDICINE | Facility: CLINIC | Age: 64
End: 2024-06-04
Payer: COMMERCIAL

## 2024-06-04 ENCOUNTER — LAB (OUTPATIENT)
Dept: LAB | Facility: HOSPITAL | Age: 64
End: 2024-06-04
Payer: COMMERCIAL

## 2024-06-04 VITALS
BODY MASS INDEX: 34.85 KG/M2 | OXYGEN SATURATION: 98 % | TEMPERATURE: 97.4 F | HEART RATE: 65 BPM | HEIGHT: 73 IN | WEIGHT: 263 LBS | DIASTOLIC BLOOD PRESSURE: 80 MMHG | SYSTOLIC BLOOD PRESSURE: 128 MMHG

## 2024-06-04 DIAGNOSIS — I10 PRIMARY HYPERTENSION: Primary | ICD-10-CM

## 2024-06-04 DIAGNOSIS — E78.2 MIXED HYPERLIPIDEMIA: ICD-10-CM

## 2024-06-04 DIAGNOSIS — M62.81 GENERALIZED MUSCLE WEAKNESS: ICD-10-CM

## 2024-06-04 DIAGNOSIS — M17.12 PRIMARY OSTEOARTHRITIS OF LEFT KNEE: ICD-10-CM

## 2024-06-04 DIAGNOSIS — I10 PRIMARY HYPERTENSION: ICD-10-CM

## 2024-06-04 LAB
ANION GAP SERPL CALCULATED.3IONS-SCNC: 10.8 MMOL/L (ref 5–15)
BUN SERPL-MCNC: 14 MG/DL (ref 8–23)
BUN/CREAT SERPL: 15.1 (ref 7–25)
CALCIUM SPEC-SCNC: 9.5 MG/DL (ref 8.6–10.5)
CHLORIDE SERPL-SCNC: 101 MMOL/L (ref 98–107)
CHOLEST SERPL-MCNC: 130 MG/DL (ref 0–200)
CO2 SERPL-SCNC: 25.2 MMOL/L (ref 22–29)
CREAT SERPL-MCNC: 0.93 MG/DL (ref 0.76–1.27)
EGFRCR SERPLBLD CKD-EPI 2021: 93.4 ML/MIN/1.73
GLUCOSE SERPL-MCNC: 116 MG/DL (ref 65–99)
HDLC SERPL-MCNC: 41 MG/DL (ref 40–60)
LDLC SERPL CALC-MCNC: 61 MG/DL (ref 0–100)
LDLC/HDLC SERPL: 1.37 {RATIO}
POTASSIUM SERPL-SCNC: 3.8 MMOL/L (ref 3.5–5.2)
SODIUM SERPL-SCNC: 137 MMOL/L (ref 136–145)
TESTOST SERPL-MCNC: 117 NG/DL (ref 193–740)
TRIGL SERPL-MCNC: 164 MG/DL (ref 0–150)
VLDLC SERPL-MCNC: 28 MG/DL (ref 5–40)

## 2024-06-04 PROCEDURE — 80048 BASIC METABOLIC PNL TOTAL CA: CPT

## 2024-06-04 PROCEDURE — 99214 OFFICE O/P EST MOD 30 MIN: CPT | Performed by: INTERNAL MEDICINE

## 2024-06-04 PROCEDURE — 80061 LIPID PANEL: CPT

## 2024-06-04 PROCEDURE — 84403 ASSAY OF TOTAL TESTOSTERONE: CPT

## 2024-06-04 RX ORDER — ATORVASTATIN CALCIUM 40 MG/1
40 TABLET, FILM COATED ORAL NIGHTLY
COMMUNITY
End: 2024-06-04

## 2024-06-04 RX ORDER — MELOXICAM 15 MG/1
15 TABLET ORAL DAILY
Qty: 90 TABLET | Refills: 1 | Status: SHIPPED | OUTPATIENT
Start: 2024-06-04

## 2024-06-04 RX ORDER — LOSARTAN POTASSIUM 100 MG/1
1 TABLET ORAL DAILY
COMMUNITY
End: 2024-06-04

## 2024-06-04 NOTE — PROGRESS NOTES
Internal Medicine Follow Up    Chief Complaint  Buzz Elmore is a 63 y.o. male who presents today for follow up of chronic medical conditions outlined below.    Chief Complaint   Patient presents with    Hyperlipidemia    Hypertension    Knee Pain     Left knee pain        HPI  Mr. Elmore comes in today for follow up. BP controlled. No changes to medications. He notes increasing L knee pain posteriorly more than anteriorly. Has appt with Dr. Castellanos next week. Plans to try another injection and get knee replaced next year after retiring. He is wearing a brace. Usually takes meloxicam but ran out a week ago. He notes concern for low testosterone. He feels that he has become weaker with age. No mushtqa muscle atrophy. Not fatigued. Able to complete the work but requires more effort. Denies ED, hair loss, low libido.    Hyperlipidemia    Hypertension    Knee Pain          Review of Systems  Review of Systems   Constitutional:  Negative for fatigue.   Respiratory: Negative.     Cardiovascular: Negative.    Genitourinary: Negative.    Neurological:  Positive for weakness.        Current Medications  Current Outpatient Medications on File Prior to Visit   Medication Sig Dispense Refill    aspirin 81 MG tablet Take 1 tablet by mouth Daily.      carvedilol (COREG) 12.5 MG tablet TAKE ONE TABLET BY MOUTH TWICE A DAY WITH MEALS 60 tablet 11    Cholecalciferol (VITAMIN D) 2000 UNITS capsule Take 1 capsule by mouth Daily.      fluticasone (FLONASE) 50 MCG/ACT nasal spray 2 sprays by Each Nare route Daily. (Patient taking differently: 2 sprays by Each Nare route As Needed.) 48 mL 5    pantoprazole (PROTONIX) 40 MG EC tablet TAKE 1 TABLET BY MOUTH DAILY 90 tablet 1    prasugrel (EFFIENT) 10 MG tablet Take 1 tablet by mouth Daily. 90 tablet 3    rosuvastatin (CRESTOR) 40 MG tablet TAKE ONE TABLET BY MOUTH DAILY 90 tablet 3    Semaglutide, 2 MG/DOSE, (Ozempic, 2 MG/DOSE,) 8 MG/3ML solution pen-injector Inject 2 mg under the  "skin into the appropriate area as directed 1 (One) Time Per Week. 3 mL 3    tamsulosin (FLOMAX) 0.4 MG capsule 24 hr capsule Take 1 capsule by mouth Every Night. 30 capsule 0    telmisartan-hydrochlorothiazide (Micardis HCT) 80-25 MG per tablet Take 1 tablet by mouth Daily. 30 tablet 11    [DISCONTINUED] atorvastatin (Lipitor) 40 MG tablet Take 1 tablet by mouth Every Night.      [DISCONTINUED] fenofibrate (TRICOR) 48 MG tablet Daily.      [DISCONTINUED] losartan (COZAAR) 100 MG tablet Take 1 tablet by mouth Daily.      [DISCONTINUED] meloxicam (MOBIC) 15 MG tablet Take 1 tablet by mouth Daily. 90 tablet 0    [DISCONTINUED] Tirzepatide (Mounjaro) 2.5 MG/0.5ML solution pen-injector pen Inject 0.5 mL under the skin into the appropriate area as directed 1 (One) Time Per Week.       No current facility-administered medications on file prior to visit.       Allergies  Allergies   Allergen Reactions    Atorvastatin Myalgia       Objective  Visit Vitals  /80   Pulse 65   Temp 97.4 °F (36.3 °C)   Ht 185.4 cm (73\")   Wt 119 kg (263 lb)   SpO2 98%   BMI 34.70 kg/m²        Physical Exam  Physical Exam  Vitals and nursing note reviewed.   Constitutional:       General: He is not in acute distress.     Appearance: He is well-developed. He is obese. He is not ill-appearing or toxic-appearing.   HENT:      Head: Normocephalic and atraumatic.   Eyes:      Conjunctiva/sclera: Conjunctivae normal.   Cardiovascular:      Rate and Rhythm: Normal rate and regular rhythm.      Heart sounds: Normal heart sounds. No murmur heard.  Pulmonary:      Effort: Pulmonary effort is normal. No respiratory distress.      Breath sounds: Normal breath sounds.   Musculoskeletal:      Comments: Brace on L knee   Skin:     General: Skin is warm and dry.   Neurological:      Mental Status: He is alert and oriented to person, place, and time. Mental status is at baseline.      Motor: No atrophy.      Gait: Gait normal.   Psychiatric:         Mood and " Affect: Mood normal.         Behavior: Behavior normal.         Thought Content: Thought content normal.         Judgment: Judgment normal.         Results  Results for orders placed or performed in visit on 03/12/24   POC Glycosylated Hemoglobin (Hb A1C)    Specimen: Blood   Result Value Ref Range    Hemoglobin A1C 6.1 (A) 4.5 - 5.7 %    Lot Number [id number]     Expiration Date 11/12/2025    POC Glucose, Blood    Specimen: Blood   Result Value Ref Range    Glucose 113 70 - 130 mg/dL    Lot Number [id number]     Expiration Date 10/05/2024         Assessment and Plan  Diagnoses and all orders for this visit:    Primary hypertension  - BP controlled, continue telmisartan-HCTZ 80-25mg daily and coreg 12.5mg BID   - BMP today    Primary osteoarthritis of left knee  - follows with Dr. Castellanos and is s/p R TKA 2/2023 with residual pain in R knee and pain due to bone on bone arthritis in L knee  - on mobic 15mg daily.  - will see Dr. Castellanos next week to discuss repeat injection. Plan for eventual knee replacement.    Mixed hyperlipidemia  - historically well controlled, continue crestor 40mg daily and update lipid panel    Generalized muscle weakness  - will obtain testosterone level. He is fasting.     Health Maintenance  - Colonoscopy: 1/2023, repeat 2026  - HCV: negative  - Immunizations: flu and COVID discussed. Defers PCV20 and shingrix. Tdap 2021.  - Depression screening: negative 6/2024    Return for Next scheduled follow up, Labs today.

## 2024-06-05 ENCOUNTER — PATIENT MESSAGE (OUTPATIENT)
Dept: ENDOCRINOLOGY | Facility: CLINIC | Age: 64
End: 2024-06-05
Payer: COMMERCIAL

## 2024-06-07 ENCOUNTER — PRIOR AUTHORIZATION (OUTPATIENT)
Dept: ENDOCRINOLOGY | Facility: CLINIC | Age: 64
End: 2024-06-07
Payer: COMMERCIAL

## 2024-06-09 DIAGNOSIS — R79.89 LOW TESTOSTERONE: Primary | ICD-10-CM

## 2024-06-12 ENCOUNTER — OFFICE VISIT (OUTPATIENT)
Dept: ORTHOPEDIC SURGERY | Facility: CLINIC | Age: 64
End: 2024-06-12
Payer: COMMERCIAL

## 2024-06-12 ENCOUNTER — LAB (OUTPATIENT)
Dept: LAB | Facility: HOSPITAL | Age: 64
End: 2024-06-12
Payer: COMMERCIAL

## 2024-06-12 VITALS
BODY MASS INDEX: 34.85 KG/M2 | HEIGHT: 73 IN | SYSTOLIC BLOOD PRESSURE: 136 MMHG | DIASTOLIC BLOOD PRESSURE: 72 MMHG | WEIGHT: 263 LBS

## 2024-06-12 DIAGNOSIS — M17.12 PRIMARY OSTEOARTHRITIS OF LEFT KNEE: Primary | ICD-10-CM

## 2024-06-12 DIAGNOSIS — R79.89 LOW TESTOSTERONE: ICD-10-CM

## 2024-06-12 LAB — TESTOST SERPL-MCNC: 234 NG/DL (ref 193–740)

## 2024-06-12 PROCEDURE — 20610 DRAIN/INJ JOINT/BURSA W/O US: CPT | Performed by: ORTHOPAEDIC SURGERY

## 2024-06-12 PROCEDURE — 84403 ASSAY OF TOTAL TESTOSTERONE: CPT

## 2024-06-12 RX ORDER — TRIAMCINOLONE ACETONIDE 40 MG/ML
40 INJECTION, SUSPENSION INTRA-ARTICULAR; INTRAMUSCULAR
Status: COMPLETED | OUTPATIENT
Start: 2024-06-12 | End: 2024-06-12

## 2024-06-12 RX ORDER — ROPIVACAINE HYDROCHLORIDE 5 MG/ML
4 INJECTION, SOLUTION EPIDURAL; INFILTRATION; PERINEURAL
Status: COMPLETED | OUTPATIENT
Start: 2024-06-12 | End: 2024-06-12

## 2024-06-12 RX ADMIN — TRIAMCINOLONE ACETONIDE 40 MG: 40 INJECTION, SUSPENSION INTRA-ARTICULAR; INTRAMUSCULAR at 08:11

## 2024-06-12 RX ADMIN — ROPIVACAINE HYDROCHLORIDE 4 ML: 5 INJECTION, SOLUTION EPIDURAL; INFILTRATION; PERINEURAL at 08:11

## 2024-06-12 NOTE — PROGRESS NOTES
Procedure   Large Joint Arthrocentesis: L knee  Date/Time: 6/12/2024 8:11 AM  Consent given by: patient  Site marked: site marked  Timeout: Immediately prior to procedure a time out was called to verify the correct patient, procedure, equipment, support staff and site/side marked as required   Supporting Documentation  Indications: pain   Procedure Details  Location: knee - L knee  Preparation: Patient was prepped and draped in the usual sterile fashion  Needle gauge: 21g.  Medications administered: 4 mL ropivacaine 0.5 %; 40 mg triamcinolone acetonide 40 MG/ML  Patient tolerance: patient tolerated the procedure well with no immediate complications

## 2024-06-12 NOTE — PROGRESS NOTES
[facility]    Subjective     Chief Complaint   Patient presents with    Follow-up     4 month recheck- Primary osteoarthritis of left knee        HPI    It has been 4  month(s) since Mr. Elmore's last visit. He returns to clinic today for follow-up of left knee arthritis . The issue has been ongoing for 5 year(s). He rates his pain a 6/10 on the pain scale. Previous/current treatments: NSAIDS and physical therapy. Current symptoms: pain, swelling, popping, grinding, and stiffness. The pain is worse with sitting and rising from seated position; movement improve the pain. Overall, he is doing the same.  He would like an injection today.  He is not yet interested in surgical intervention.    I have reviewed the following portions of the patient's history and agree with: History of Present Illness and Review of Systems    Patient Active Problem List   Diagnosis    Coagulation disorder    Benign prostatic hyperplasia with urinary obstruction    Mechanical complication due to tissue graft    Atherosclerosis of coronary artery    Type 2 diabetes mellitus without complication, without long-term current use of insulin    Gastroesophageal reflux disease    Hyperlipidemia    Hypertension    Lipoma    Calculus of kidney    Vitamin D deficiency    IHD (ischemic heart disease)    Obesity    Chronic low back pain    Colon polyps    Degenerative arthritis of right knee    S/P total knee replacement, right    Primary osteoarthritis of left knee    Family history of colon cancer    Mixed conductive and sensorineural hearing loss    Sensorineural hearing loss (SNHL) of both ears    Otitis media of left ear    Acute serous otitis media     Past Medical History:   Diagnosis Date    Abdominal wall cellulitis 03/11/2021    Arthritis     Chronic low back pain     Coagulation disorder 06/20/2016    Coronary artery disease     GERD (gastroesophageal reflux disease)     Hyperlipidemia     Hypertension     IHD  (ischemic heart disease)     IHD (ischemic heart disease)     Knee swelling     Obesity     Tear of meniscus of knee     Tendinitis of knee     Type 2 diabetes mellitus without complication, without long-term current use of insulin 06/20/2016      Past Surgical History:   Procedure Laterality Date    CARDIAC CATHETERIZATION      COLONOSCOPY      CORONARY STENT PLACEMENT      HAND SURGERY Left     skin graft on finger, L middle finger    KNEE ARTHROTOMY      History of Arthrotomy Of Knee With Medial Meniscectomy    TOTAL KNEE ARTHROPLASTY Right 02/14/2023    Procedure: TOTAL KNEE ARTHROPLASTY WITH CORI ROBOT - RIGHT;  Surgeon: Prince Castellanos MD;  Location: [facility];  Service: Robotics - Ortho;  Laterality: Right;      Family History   Problem Relation Age of Onset    Breast cancer Mother     Heart disease Mother     Hypertension Mother     Heart attack Mother     Hyperlipidemia Mother     Diabetes Mother     Obesity Mother     Cancer Mother     Colon cancer Father         diagnosed in his 60s    Hypertension Father     Heart attack Father     Diabetes Father     Obesity Father     Colon cancer Sister 58    No Known Problems Sister     Heart disease Brother     No Known Problems Brother     Heart disease Paternal Grandfather      Social History     Socioeconomic History    Marital status:    Tobacco Use    Smoking status: Never     Passive exposure: Never    Smokeless tobacco: Never   Vaping Use    Vaping status: Never Used   Substance and Sexual Activity    Alcohol use: Never    Drug use: Never    Sexual activity: Yes     Partners: Female     Birth control/protection: None, Natural family planning/Rhythm      Current Outpatient Medications on File Prior to Visit   Medication Sig Dispense Refill    aspirin 81 MG tablet Take 1 tablet by mouth Daily.      carvedilol (COREG) 12.5 MG tablet TAKE ONE TABLET BY MOUTH TWICE A DAY WITH MEALS 60 tablet 11    Cholecalciferol (VITAMIN D) 2000 UNITS capsule Take 1  capsule by mouth Daily.      fluticasone (FLONASE) 50 MCG/ACT nasal spray 2 sprays by Each Nare route Daily. (Patient taking differently: 2 sprays by Each Nare route As Needed.) 48 mL 5    meloxicam (MOBIC) 15 MG tablet Take 1 tablet by mouth Daily. 90 tablet 1    pantoprazole (PROTONIX) 40 MG EC tablet TAKE 1 TABLET BY MOUTH DAILY 90 tablet 1    prasugrel (EFFIENT) 10 MG tablet Take 1 tablet by mouth Daily. 90 tablet 3    rosuvastatin (CRESTOR) 40 MG tablet TAKE ONE TABLET BY MOUTH DAILY 90 tablet 3    Semaglutide, 2 MG/DOSE, (Ozempic, 2 MG/DOSE,) 8 MG/3ML solution pen-injector Inject 2 mg under the skin into the appropriate area as directed 1 (One) Time Per Week. 3 mL 3    tamsulosin (FLOMAX) 0.4 MG capsule 24 hr capsule Take 1 capsule by mouth Every Night. 30 capsule 0    telmisartan-hydrochlorothiazide (Micardis HCT) 80-25 MG per tablet Take 1 tablet by mouth Daily. 30 tablet 11     No current facility-administered medications on file prior to visit.      Allergies   Allergen Reactions    Atorvastatin Myalgia        Review of Systems   Constitutional:  Negative for activity change, appetite change, chills, diaphoresis, fatigue, fever and unexpected weight change.   HENT:  Negative for congestion, dental problem, drooling, ear discharge, ear pain, facial swelling, hearing loss, mouth sores, nosebleeds, postnasal drip, rhinorrhea, sinus pressure, sneezing, sore throat, tinnitus, trouble swallowing and voice change.    Eyes:  Negative for photophobia, pain, discharge, redness, itching and visual disturbance.   Respiratory:  Negative for apnea, cough, choking, chest tightness, shortness of breath, wheezing and stridor.    Cardiovascular:  Negative for chest pain, palpitations and leg swelling.   Gastrointestinal:  Negative for abdominal distention, abdominal pain, anal bleeding, blood in stool, constipation, diarrhea, nausea, rectal pain and vomiting.   Endocrine: Negative for cold intolerance, heat intolerance,  "polydipsia, polyphagia and polyuria.   Genitourinary:  Negative for decreased urine volume, difficulty urinating, dysuria, enuresis, flank pain, frequency, genital sores, hematuria and urgency.   Musculoskeletal:  Positive for arthralgias. Negative for back pain, gait problem, joint swelling, myalgias, neck pain and neck stiffness.   Skin:  Negative for color change, pallor, rash and wound.   Allergic/Immunologic: Negative for environmental allergies, food allergies and immunocompromised state.   Neurological:  Negative for dizziness, tremors, seizures, syncope, facial asymmetry, speech difficulty, weakness, light-headedness, numbness and headaches.   Hematological:  Negative for adenopathy. Does not bruise/bleed easily.   Psychiatric/Behavioral:  Negative for agitation, behavioral problems, confusion, decreased concentration, dysphoric mood, hallucinations, self-injury, sleep disturbance and suicidal ideas. The patient is not nervous/anxious and is not hyperactive.         Objective      Physical Exam  /72   Ht 185.3 cm (72.95\")   Wt 119 kg (263 lb)   BMI 34.74 kg/m²     Body mass index is 34.74 kg/m².         General:   Mental Status:  Alert   Appearance: Cooperative, in no acute distress   Build and Nutrition: Well-nourished well-developed male   Orientation: Alert and oriented to person, place and time   Posture: Normal   Gait: Nonantalgic    Integument:              Left knee: no skin lesions, no rash, no ecchymosis     Lower Extremities:              Left Knee:                          Tenderness:    Medial joint line tenderness                          Effusion:          1+                          Swelling:          None                          Crepitus:          Positive                          Atrophy:           None                          Range of motion:        Extension:       0°                                                              Flexion:           125°  Instability:        No " varus laxity, no valgus laxity, negative anterior drawer  Deformities:     Varus    Imaging/Studies  Imaging Results (Last 24 Hours)       ** No results found for the last 24 hours. **          No new imaging today.    Assessment and Plan     Diagnoses and all orders for this visit:    1. Primary osteoarthritis of left knee (Primary)  -     Cancel: XR Knee 4+ View Left  -     Large Joint Arthrocentesis: L knee        1. Primary osteoarthritis of left knee        I reviewed my findings with the patient.  He would like an injection for his left knee today.  He is interested in surgery later on near alf, but at this point he would like a repeat injection.  I will see him back in 4 months, but sooner for any problems.    Procedure Note:  The potential benefits of performing a therapeutic left knee joint injection, as well as potential risks (including, but not limited to infection, swelling, pain, bleeding, bruising, nerve/blood vessel damage, skin color changes, transient elevation in blood glucose levels, and fat atrophy) were discussed with the patient.  After informed consent, timeout procedure was performed, and the skin on the left knee was prepped with chlorhexidine soap and alcohol, after which ethyl chloride was applied to the skin at the injection site. Via the anterolateral approach, 1ml of Kenalog 40mg/ml mixed with 4ml 0.5% ropivacaine plain was injected into the knee joint.  The patient tolerated the procedure well, experiencing 100% improvement a few minutes following the injection. There were no complications.  Band-Aid was applied to the injection site. Post-procedural instructions were given to the patient and/or their caregiver.      Return in about 4 months (around 10/12/2024).      Prince Castellanos MD  06/12/24  08:34 EDT    Dictated Utilizing Dragon Dictation

## 2024-08-06 ENCOUNTER — OFFICE VISIT (OUTPATIENT)
Dept: ENDOCRINOLOGY | Facility: CLINIC | Age: 64
End: 2024-08-06
Payer: COMMERCIAL

## 2024-08-06 VITALS
OXYGEN SATURATION: 97 % | DIASTOLIC BLOOD PRESSURE: 88 MMHG | BODY MASS INDEX: 34.4 KG/M2 | SYSTOLIC BLOOD PRESSURE: 134 MMHG | WEIGHT: 254 LBS | HEART RATE: 64 BPM | HEIGHT: 72 IN

## 2024-08-06 DIAGNOSIS — E78.2 MIXED HYPERLIPIDEMIA: ICD-10-CM

## 2024-08-06 DIAGNOSIS — I10 PRIMARY HYPERTENSION: ICD-10-CM

## 2024-08-06 DIAGNOSIS — E23.0 HYPOGONADOTROPIC HYPOGONADISM: ICD-10-CM

## 2024-08-06 DIAGNOSIS — E11.9 TYPE 2 DIABETES MELLITUS WITHOUT COMPLICATION, WITHOUT LONG-TERM CURRENT USE OF INSULIN: Primary | ICD-10-CM

## 2024-08-06 LAB
ALBUMIN SERPL-MCNC: 4.2 G/DL (ref 3.5–5.2)
ALBUMIN/GLOB SERPL: 1.4 G/DL
ALP SERPL-CCNC: 56 U/L (ref 39–117)
ALT SERPL W P-5'-P-CCNC: 26 U/L (ref 1–41)
ANION GAP SERPL CALCULATED.3IONS-SCNC: 12 MMOL/L (ref 5–15)
AST SERPL-CCNC: 29 U/L (ref 1–40)
BILIRUB SERPL-MCNC: 0.8 MG/DL (ref 0–1.2)
BUN SERPL-MCNC: 22 MG/DL (ref 8–23)
BUN/CREAT SERPL: 16.8 (ref 7–25)
CALCIUM SPEC-SCNC: 10 MG/DL (ref 8.6–10.5)
CHLORIDE SERPL-SCNC: 104 MMOL/L (ref 98–107)
CHOLEST SERPL-MCNC: 107 MG/DL (ref 0–200)
CO2 SERPL-SCNC: 26 MMOL/L (ref 22–29)
CREAT SERPL-MCNC: 1.31 MG/DL (ref 0.76–1.27)
EGFRCR SERPLBLD CKD-EPI 2021: 61.9 ML/MIN/1.73
EXPIRATION DATE: ABNORMAL
EXPIRATION DATE: NORMAL
GLOBULIN UR ELPH-MCNC: 3 GM/DL
GLUCOSE BLDC GLUCOMTR-MCNC: 129 MG/DL (ref 70–130)
GLUCOSE SERPL-MCNC: 108 MG/DL (ref 65–99)
HBA1C MFR BLD: 6 % (ref 4.5–5.7)
HDLC SERPL-MCNC: 34 MG/DL (ref 40–60)
LDLC SERPL CALC-MCNC: 50 MG/DL (ref 0–100)
LDLC/HDLC SERPL: 1.39 {RATIO}
Lab: ABNORMAL
Lab: NORMAL
POTASSIUM SERPL-SCNC: 3.8 MMOL/L (ref 3.5–5.2)
PROLACTIN SERPL-MCNC: 8.4 NG/ML (ref 4.04–15.2)
PROT SERPL-MCNC: 7.2 G/DL (ref 6–8.5)
SODIUM SERPL-SCNC: 142 MMOL/L (ref 136–145)
T4 FREE SERPL-MCNC: 1.16 NG/DL (ref 0.92–1.68)
TESTOST SERPL-MCNC: 127 NG/DL (ref 193–740)
TRIGL SERPL-MCNC: 129 MG/DL (ref 0–150)
TSH SERPL DL<=0.05 MIU/L-ACNC: 1.85 UIU/ML (ref 0.27–4.2)
VLDLC SERPL-MCNC: 23 MG/DL (ref 5–40)

## 2024-08-06 PROCEDURE — 84146 ASSAY OF PROLACTIN: CPT | Performed by: INTERNAL MEDICINE

## 2024-08-06 PROCEDURE — 84402 ASSAY OF FREE TESTOSTERONE: CPT | Performed by: INTERNAL MEDICINE

## 2024-08-06 PROCEDURE — 83036 HEMOGLOBIN GLYCOSYLATED A1C: CPT | Performed by: INTERNAL MEDICINE

## 2024-08-06 PROCEDURE — 80061 LIPID PANEL: CPT | Performed by: INTERNAL MEDICINE

## 2024-08-06 PROCEDURE — 99214 OFFICE O/P EST MOD 30 MIN: CPT | Performed by: INTERNAL MEDICINE

## 2024-08-06 PROCEDURE — 82947 ASSAY GLUCOSE BLOOD QUANT: CPT | Performed by: INTERNAL MEDICINE

## 2024-08-06 PROCEDURE — 80053 COMPREHEN METABOLIC PANEL: CPT | Performed by: INTERNAL MEDICINE

## 2024-08-06 PROCEDURE — 84439 ASSAY OF FREE THYROXINE: CPT | Performed by: INTERNAL MEDICINE

## 2024-08-06 PROCEDURE — 84403 ASSAY OF TOTAL TESTOSTERONE: CPT | Performed by: INTERNAL MEDICINE

## 2024-08-06 PROCEDURE — 84443 ASSAY THYROID STIM HORMONE: CPT | Performed by: INTERNAL MEDICINE

## 2024-08-06 NOTE — ASSESSMENT & PLAN NOTE
Has had 2 tests- one low and one normal  Feels like he is symptomatic  Will need to supplement with caution due to higher bp   Recheck total and free T

## 2024-08-06 NOTE — PROGRESS NOTES
Buzz Elmore 63 y.o.  CC: follow up Diabetes, Hypertension, and Hyperlipidemia      Hoh: follow up Diabetes, Hypertension, and Hyperlipidemia    Blood sugar and 90 day average sugar reviewed  Results for orders placed or performed in visit on 08/06/24   POC Glycosylated Hemoglobin (Hb A1C)    Specimen: Blood   Result Value Ref Range    Hemoglobin A1C 6.0 (A) 4.5 - 5.7 %    Lot Number [id number]     Expiration Date 12-7-26    POC Glucose, Blood    Specimen: Blood   Result Value Ref Range    Glucose 129 70 - 130 mg/dL    Lot Number [id number]     Expiration Date 12-7-24      Average sugar is 120   BP is high/normal taking micardis hct and coreg  Is eating low fat diet, taking crestor 40 mg daily  Hard time losing weight   Concerned about T- feels tired, difficulty with weight   Recheck discussed and he is fasting     Allergies   Allergen Reactions    Atorvastatin Myalgia       Current Outpatient Medications:     aspirin 81 MG tablet, Take 1 tablet by mouth Daily., Disp: , Rfl:     carvedilol (COREG) 12.5 MG tablet, TAKE ONE TABLET BY MOUTH TWICE A DAY WITH MEALS, Disp: 60 tablet, Rfl: 11    Cholecalciferol (VITAMIN D) 2000 UNITS capsule, Take 1 capsule by mouth Daily., Disp: , Rfl:     fluticasone (FLONASE) 50 MCG/ACT nasal spray, 2 sprays by Each Nare route Daily. (Patient taking differently: 2 sprays by Each Nare route As Needed.), Disp: 48 mL, Rfl: 5    meloxicam (MOBIC) 15 MG tablet, Take 1 tablet by mouth Daily., Disp: 90 tablet, Rfl: 1    pantoprazole (PROTONIX) 40 MG EC tablet, TAKE 1 TABLET BY MOUTH DAILY, Disp: 90 tablet, Rfl: 1    prasugrel (EFFIENT) 10 MG tablet, Take 1 tablet by mouth Daily., Disp: 90 tablet, Rfl: 3    rosuvastatin (CRESTOR) 40 MG tablet, TAKE ONE TABLET BY MOUTH DAILY, Disp: 90 tablet, Rfl: 3    Semaglutide, 2 MG/DOSE, (Ozempic, 2 MG/DOSE,) 8 MG/3ML solution pen-injector, Inject 2 mg under the skin into the appropriate area as directed 1 (One) Time Per Week., Disp: 3 mL, Rfl: 3     tamsulosin (FLOMAX) 0.4 MG capsule 24 hr capsule, Take 1 capsule by mouth Every Night., Disp: 30 capsule, Rfl: 0    telmisartan-hydrochlorothiazide (Micardis HCT) 80-25 MG per tablet, Take 1 tablet by mouth Daily., Disp: 30 tablet, Rfl: 11    Patient Active Problem List    Diagnosis     Hypogonadotropic 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]     Family history of colon cancer [Z80.0]     Primary osteoarthritis of left knee [M17.12]     S/P total knee replacement, right [Z96.651]     Degenerative arthritis of right knee [M17.11]     Colon polyps [K63.5]     IHD (ischemic heart disease) [I25.9]     Obesity [E66.9]     Chronic low back pain [M54.50, G89.29]     Coagulation disorder [D68.9]     Mechanical complication due to tissue graft [T85.698A]     Atherosclerosis of coronary artery [I25.10]     Type 2 diabetes mellitus without complication, without long-term current use of insulin [E11.9]     Gastroesophageal reflux disease [K21.9]     Hyperlipidemia [E78.5]     Hypertension [I10]     Vitamin D deficiency [E55.9]     Acute serous otitis media [H65.00]     Mixed conductive and sensorineural hearing loss [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 Negative for appetite change.   HENT:  Negative for congestion and rhinorrhea.    Eyes:  Negative for visual disturbance.   Respiratory:  Negative for cough and shortness of breath.    Cardiovascular:  Negative for palpitations and leg swelling.   Gastrointestinal:  Negative for constipation, diarrhea and nausea.   Genitourinary:  Negative for hematuria.   Musculoskeletal:  Negative for arthralgias, back pain, gait problem, joint swelling and myalgias.   Skin:  Negative for color change, rash and wound.   Allergic/Immunologic: Negative for environmental allergies, food allergies and immunocompromised state.   Neurological:   "Negative for dizziness, weakness and light-headedness.   Psychiatric/Behavioral:  Negative for confusion, decreased concentration, dysphoric mood and sleep disturbance. The patient is not nervous/anxious.      Social History     Socioeconomic History    Marital status:    Tobacco Use    Smoking status: Never     Passive exposure: Never    Smokeless tobacco: Never   Vaping Use    Vaping status: Never Used   Substance and Sexual Activity    Alcohol use: Never    Drug use: Never    Sexual activity: Yes     Partners: Female     Birth control/protection: None, Natural family planning/Rhythm     Family History   Problem Relation Age of Onset    Breast cancer Mother     Heart disease Mother     Hypertension Mother     Heart attack Mother     Hyperlipidemia Mother     Diabetes Mother     Obesity Mother     Cancer Mother     Colon cancer Father         diagnosed in his 60s    Hypertension Father     Heart attack Father     Diabetes Father     Obesity Father     Colon cancer Sister 58    No Known Problems Sister     Heart disease Brother     No Known Problems Brother     Heart disease Paternal Grandfather      /88 (BP Location: Right arm, Patient Position: Sitting)   Pulse 64   Ht 182.9 cm (72\")   Wt 115 kg (254 lb)   SpO2 97%   BMI 34.45 kg/m²   Physical Exam  Vitals and nursing note reviewed.   Constitutional:       Appearance: Normal appearance. He is well-developed.   HENT:      Head: Normocephalic and atraumatic.   Eyes:      General: Lids are normal.      Extraocular Movements: Extraocular movements intact.      Conjunctiva/sclera: Conjunctivae normal.      Pupils: Pupils are equal, round, and reactive to light.   Neck:      Thyroid: No thyroid mass or thyromegaly.      Vascular: No carotid bruit.      Trachea: Trachea normal. No tracheal deviation.   Cardiovascular:      Rate and Rhythm: Normal rate and regular rhythm.      Pulses: Normal pulses.      Heart sounds: Normal heart sounds. No murmur " heard.     No friction rub. No gallop.   Pulmonary:      Effort: Pulmonary effort is normal. No respiratory distress.      Breath sounds: Normal breath sounds. No wheezing.   Musculoskeletal:         General: No deformity. Normal range of motion.      Cervical back: Normal range of motion and neck supple.   Lymphadenopathy:      Cervical: No cervical adenopathy.   Skin:     General: Skin is warm and dry.      Findings: No erythema or rash.      Nails: There is no clubbing.   Neurological:      General: No focal deficit present.      Mental Status: He is alert and oriented to person, place, and time.      Cranial Nerves: No cranial nerve deficit.      Deep Tendon Reflexes: Reflexes are normal and symmetric. Reflexes normal.   Psychiatric:         Mood and Affect: Mood normal.         Speech: Speech normal.         Behavior: Behavior normal.         Thought Content: Thought content normal.         Judgment: Judgment normal.       Results for orders placed or performed in visit on 08/06/24   POC Glycosylated Hemoglobin (Hb A1C)    Specimen: Blood   Result Value Ref Range    Hemoglobin A1C 6.0 (A) 4.5 - 5.7 %    Lot Number [id number]     Expiration Date 12-7-26    POC Glucose, Blood    Specimen: Blood   Result Value Ref Range    Glucose 129 70 - 130 mg/dL    Lot Number [id number]     Expiration Date 12-7-24      Diagnoses and all orders for this visit:    1. Type 2 diabetes mellitus without complication, without long-term current use of insulin (Primary)  Assessment & Plan:  Blood sugar and 90 day average sugar are good  Results for orders placed or performed in visit on 08/06/24   POC Glycosylated Hemoglobin (Hb A1C)    Specimen: Blood   Result Value Ref Range    Hemoglobin A1C 6.0 (A) 4.5 - 5.7 %    Lot Number [id number]     Expiration Date 12-7-26    POC Glucose, Blood    Specimen: Blood   Result Value Ref Range    Glucose 129 70 - 130 mg/dL    Lot Number [id number]     Expiration Date 12-7-24      Is utd with eye  exam  Check ur alb today     Orders:  -     POC Glycosylated Hemoglobin (Hb A1C)  -     POC Glucose, Blood  -     Comprehensive Metabolic Panel; Future  -     Microalbumin / Creatinine Urine Ratio - Urine, Clean Catch; Future  -     Lipid Panel; Future  -     TSH; Future  -     T4, Free; Future  -     Testosterone; Future  -     Testosterone Free Direct; Future  -     Prolactin; Future  -     Comprehensive Metabolic Panel  -     Microalbumin / Creatinine Urine Ratio - Urine, Clean Catch  -     Lipid Panel  -     TSH  -     T4, Free  -     Testosterone  -     Testosterone Free Direct  -     Prolactin    2. Mixed hyperlipidemia  Assessment & Plan:  Stable medications- update flp       3. Primary hypertension  Assessment & Plan:  High bp today   Monitor at home, reduce salt intake and get 30 min of activity daily       4. Hypogonadotropic hypogonadism  Assessment & Plan:  Has had 2 tests- one low and one normal  Feels like he is symptomatic  Will need to supplement with caution due to higher bp   Recheck total and free T      Return in about 4 months (around 12/6/2024) for Recheck.    Electronically signed by: Maida Rey MD

## 2024-08-06 NOTE — ASSESSMENT & PLAN NOTE
Blood sugar and 90 day average sugar are good  Results for orders placed or performed in visit on 08/06/24   POC Glycosylated Hemoglobin (Hb A1C)    Specimen: Blood   Result Value Ref Range    Hemoglobin A1C 6.0 (A) 4.5 - 5.7 %    Lot Number [id number]     Expiration Date 12-7-26    POC Glucose, Blood    Specimen: Blood   Result Value Ref Range    Glucose 129 70 - 130 mg/dL    Lot Number [id number]     Expiration Date 12-7-24      Is utd with eye exam  Check ur alb today

## 2024-08-07 DIAGNOSIS — E23.0 HYPOGONADOTROPIC HYPOGONADISM: Primary | ICD-10-CM

## 2024-08-07 RX ORDER — TESTOSTERONE 16.2 MG/G
GEL TRANSDERMAL
Qty: 75 G | Refills: 3 | Status: SHIPPED | OUTPATIENT
Start: 2024-08-07

## 2024-08-08 LAB — TESTOST FREE SERPL-MCNC: 3.8 PG/ML (ref 6.6–18.1)

## 2024-08-09 ENCOUNTER — PRIOR AUTHORIZATION (OUTPATIENT)
Dept: ENDOCRINOLOGY | Facility: CLINIC | Age: 64
End: 2024-08-09
Payer: COMMERCIAL

## 2024-08-09 NOTE — TELEPHONE ENCOUNTER
SHYLA BIRMINGHAM (Mitchell: [id number])  PA Case ID #: [id number]  Rx #: [id number]  Need Help? Call us at [phone]  Outcome  [facility]  Your PA request has been approved. Additional information will be provided in the approval communication. (Message 1143)  Authorization Expiration Date: 8/9/2027  Drug  Testosterone 1.62% gel  ePA cloud logo  Form  [employer] Electronic PA Form (2017 [employer])

## 2024-08-29 RX ORDER — SEMAGLUTIDE 2.68 MG/ML
INJECTION, SOLUTION SUBCUTANEOUS
Qty: 3 ML | Refills: 3 | Status: SHIPPED | OUTPATIENT
Start: 2024-08-29

## 2024-08-29 NOTE — TELEPHONE ENCOUNTER
Rx Refill Note  Requested Prescriptions     Pending Prescriptions Disp Refills    Ozempic, 2 MG/DOSE, 8 MG/3ML solution pen-injector [Pharmacy Med Name: OZEMPIC 2 MG/DOSE (8 MG/3 ML)] 3 mL 3     Sig: DIAL AND INJECT UNDER THE SKIN 2 MG WEEKLY      Last office visit with prescribing clinician: 8/6/2024   Next office visit with prescribing clinician: 12/17/2024                           Perla Kenyon MA  08/29/24, 10:24 EDT

## 2024-10-07 RX ORDER — PRASUGREL 10 MG/1
10 TABLET, FILM COATED ORAL DAILY
Qty: 90 TABLET | Refills: 3 | Status: SHIPPED | OUTPATIENT
Start: 2024-10-07

## 2024-10-07 RX ORDER — ROSUVASTATIN CALCIUM 40 MG/1
40 TABLET, COATED ORAL DAILY
Qty: 90 TABLET | Refills: 3 | Status: SHIPPED | OUTPATIENT
Start: 2024-10-07

## 2024-10-07 RX ORDER — PANTOPRAZOLE SODIUM 40 MG/1
40 TABLET, DELAYED RELEASE ORAL DAILY
Qty: 90 TABLET | Refills: 1 | Status: SHIPPED | OUTPATIENT
Start: 2024-10-07

## 2024-10-07 RX ORDER — CARVEDILOL 12.5 MG/1
12.5 TABLET ORAL 2 TIMES DAILY WITH MEALS
Qty: 180 TABLET | Refills: 3 | Status: SHIPPED | OUTPATIENT
Start: 2024-10-07

## 2024-10-07 RX ORDER — TELMISARTAN AND HYDROCHLORTHIAZIDE 80; 25 MG/1; MG/1
1 TABLET ORAL DAILY
Qty: 90 TABLET | Refills: 3 | Status: SHIPPED | OUTPATIENT
Start: 2024-10-07

## 2024-10-07 NOTE — TELEPHONE ENCOUNTER
Rx Refill Note  Requested Prescriptions     Pending Prescriptions Disp Refills    pantoprazole (PROTONIX) 40 MG EC tablet [Pharmacy Med Name: PANTOPRAZOLE SOD DR 40 MG TAB] 90 tablet 1     Sig: TAKE 1 TABLET BY MOUTH DAILY      Last office visit with prescribing clinician: 8/6/2024      Next office visit with prescribing clinician: 12/17/2024                  Huang Van MA  10/07/24, 11:24 EDT

## 2024-10-14 ENCOUNTER — OFFICE VISIT (OUTPATIENT)
Dept: ORTHOPEDIC SURGERY | Facility: CLINIC | Age: 64
End: 2024-10-14
Payer: COMMERCIAL

## 2024-10-14 VITALS
SYSTOLIC BLOOD PRESSURE: 150 MMHG | WEIGHT: 259.6 LBS | BODY MASS INDEX: 35.16 KG/M2 | DIASTOLIC BLOOD PRESSURE: 100 MMHG | HEIGHT: 72 IN

## 2024-10-14 DIAGNOSIS — M17.12 PRIMARY OSTEOARTHRITIS OF LEFT KNEE: Primary | ICD-10-CM

## 2024-10-14 PROCEDURE — 20610 DRAIN/INJ JOINT/BURSA W/O US: CPT | Performed by: ORTHOPAEDIC SURGERY

## 2024-10-14 RX ORDER — TRIAMCINOLONE ACETONIDE 40 MG/ML
40 INJECTION, SUSPENSION INTRA-ARTICULAR; INTRAMUSCULAR
Status: COMPLETED | OUTPATIENT
Start: 2024-10-14 | End: 2024-10-14

## 2024-10-14 RX ORDER — ROPIVACAINE HYDROCHLORIDE 5 MG/ML
4 INJECTION, SOLUTION EPIDURAL; INFILTRATION; PERINEURAL
Status: COMPLETED | OUTPATIENT
Start: 2024-10-14 | End: 2024-10-14

## 2024-10-14 RX ADMIN — TRIAMCINOLONE ACETONIDE 40 MG: 40 INJECTION, SUSPENSION INTRA-ARTICULAR; INTRAMUSCULAR at 08:23

## 2024-10-14 RX ADMIN — ROPIVACAINE HYDROCHLORIDE 4 ML: 5 INJECTION, SOLUTION EPIDURAL; INFILTRATION; PERINEURAL at 08:23

## 2024-10-14 NOTE — PROGRESS NOTES
Procedure   - Large Joint Arthrocentesis: L knee on 10/14/2024 8:23 AM  Indications: pain  Details: 21 G needle, anterolateral approach  Medications: 4 mL ropivacaine 0.5 %; 40 mg triamcinolone acetonide 40 MG/ML  Outcome: tolerated well, no immediate complications  Procedure, treatment alternatives, risks and benefits explained, specific risks discussed. Consent was given by the patient. Immediately prior to procedure a time out was called to verify the correct patient, procedure, equipment, support staff and site/side marked as required. Patient was prepped and draped in the usual sterile fashion.

## 2024-11-25 ENCOUNTER — TELEMEDICINE (OUTPATIENT)
Dept: FAMILY MEDICINE CLINIC | Facility: TELEHEALTH | Age: 64
End: 2024-11-25
Payer: COMMERCIAL

## 2024-11-25 DIAGNOSIS — J01.00 ACUTE NON-RECURRENT MAXILLARY SINUSITIS: Primary | ICD-10-CM

## 2024-11-25 PROCEDURE — 99213 OFFICE O/P EST LOW 20 MIN: CPT | Performed by: NURSE PRACTITIONER

## 2024-11-26 NOTE — PROGRESS NOTES
HPI  Buzz Elmore is a 64 y.o. male  presents with complaint of about one week history of runny nose, sinus pressure, sneezing, occasional cough due to PND. Felt a little feverish today. Tested negative for covid x2. Has taken flonase, mucinex, sudafed but symptoms continue.     Review of Systems    Past Medical History:   Diagnosis Date    Abdominal wall cellulitis 03/11/2021    Arthritis     Chronic low back pain     Coagulation disorder 06/20/2016    Coronary artery disease     GERD (gastroesophageal reflux disease)     Hyperlipidemia     Hypertension     Hypogonadotropic hypogonadism 8/6/2024    IHD (ischemic heart disease)     IHD (ischemic heart disease)     Knee swelling     Obesity     Tear of meniscus of knee     Tendinitis of knee     Type 2 diabetes mellitus without complication, without long-term current use of insulin 06/20/2016       Family History   Problem Relation Age of Onset    Breast cancer Mother     Heart disease Mother     Hypertension Mother     Heart attack Mother     Hyperlipidemia Mother     Diabetes Mother     Obesity Mother     Cancer Mother     Colon cancer Father         diagnosed in his 60s    Hypertension Father     Heart attack Father     Diabetes Father     Obesity Father     Colon cancer Sister 58    No Known Problems Sister     Heart disease Brother     No Known Problems Brother     Heart disease Paternal Grandfather        Social History     Socioeconomic History    Marital status:    Tobacco Use    Smoking status: Never     Passive exposure: Never    Smokeless tobacco: Never   Vaping Use    Vaping status: Never Used   Substance and Sexual Activity    Alcohol use: Never    Drug use: Never    Sexual activity: Yes     Partners: Female     Birth control/protection: None, Natural family planning/Rhythm         There were no vitals taken for this visit.    PHYSICAL EXAM  Physical Exam   Constitutional: He appears well-developed and well-nourished.   HENT:   Head:  Normocephalic.   Nose: Nose normal. Right sinus exhibits maxillary sinus tenderness. Left sinus exhibits maxillary sinus tenderness.   Neck: Neck normal appearance.  Pulmonary/Chest: Effort normal.   Neurological: He is alert.   Psychiatric: He has a normal mood and affect. His speech is normal.       Diagnoses and all orders for this visit:    1. Acute non-recurrent maxillary sinusitis (Primary)  -     amoxicillin-clavulanate (AUGMENTIN) 875-125 MG per tablet; Take 1 tablet by mouth 2 (Two) Times a Day for 7 days.  Dispense: 14 tablet; Refill: 0          FOLLOW-UP  As discussed during visit with [facility], if symptoms worsen or fail to improve, follow-up with PCP/Urgent Care/Emergency Department.    Patient verbalizes understanding of medications, instructions for treatment and follow-up.    Neha Lai, APRN  11/25/2024  21:40 EST      Mode of Visit: Video  Location of patient: -HOME-  Location of provider: Home  You have chosen to receive care through a telehealth visit.  The patient has signed the video visit consent form.  The visit included audio and video interaction. No technical issues occurred during this visit.

## 2024-12-17 ENCOUNTER — OFFICE VISIT (OUTPATIENT)
Dept: ENDOCRINOLOGY | Facility: CLINIC | Age: 64
End: 2024-12-17
Payer: COMMERCIAL

## 2024-12-17 VITALS
OXYGEN SATURATION: 94 % | HEIGHT: 72 IN | SYSTOLIC BLOOD PRESSURE: 132 MMHG | BODY MASS INDEX: 34.51 KG/M2 | HEART RATE: 85 BPM | DIASTOLIC BLOOD PRESSURE: 80 MMHG | WEIGHT: 254.8 LBS

## 2024-12-17 DIAGNOSIS — Z12.5 SCREENING FOR PROSTATE CANCER: ICD-10-CM

## 2024-12-17 DIAGNOSIS — E11.9 TYPE 2 DIABETES MELLITUS WITHOUT COMPLICATION, WITHOUT LONG-TERM CURRENT USE OF INSULIN: Primary | ICD-10-CM

## 2024-12-17 DIAGNOSIS — E23.0 HYPOGONADOTROPIC HYPOGONADISM: ICD-10-CM

## 2024-12-17 LAB
ALBUMIN SERPL-MCNC: 4.1 G/DL (ref 3.5–5.2)
ALBUMIN/GLOB SERPL: 1.2 G/DL
ALP SERPL-CCNC: 60 U/L (ref 39–117)
ALT SERPL W P-5'-P-CCNC: 30 U/L (ref 1–41)
ANION GAP SERPL CALCULATED.3IONS-SCNC: 13.8 MMOL/L (ref 5–15)
AST SERPL-CCNC: 31 U/L (ref 1–40)
BASOPHILS # BLD AUTO: 0.05 10*3/MM3 (ref 0–0.2)
BASOPHILS NFR BLD AUTO: 0.6 % (ref 0–1.5)
BILIRUB SERPL-MCNC: 0.9 MG/DL (ref 0–1.2)
BUN SERPL-MCNC: 20 MG/DL (ref 8–23)
BUN/CREAT SERPL: 18.7 (ref 7–25)
CALCIUM SPEC-SCNC: 9.7 MG/DL (ref 8.6–10.5)
CHLORIDE SERPL-SCNC: 102 MMOL/L (ref 98–107)
CO2 SERPL-SCNC: 25.2 MMOL/L (ref 22–29)
CREAT SERPL-MCNC: 1.07 MG/DL (ref 0.76–1.27)
DEPRECATED RDW RBC AUTO: 46.8 FL (ref 37–54)
EGFRCR SERPLBLD CKD-EPI 2021: 78.5 ML/MIN/1.73
EOSINOPHIL # BLD AUTO: 0.23 10*3/MM3 (ref 0–0.4)
EOSINOPHIL NFR BLD AUTO: 2.5 % (ref 0.3–6.2)
ERYTHROCYTE [DISTWIDTH] IN BLOOD BY AUTOMATED COUNT: 17 % (ref 12.3–15.4)
EXPIRATION DATE: ABNORMAL
EXPIRATION DATE: ABNORMAL
GLOBULIN UR ELPH-MCNC: 3.5 GM/DL
GLUCOSE BLDC GLUCOMTR-MCNC: 135 MG/DL (ref 70–130)
GLUCOSE SERPL-MCNC: 131 MG/DL (ref 65–99)
HBA1C MFR BLD: 5.8 % (ref 4.5–5.7)
HCT VFR BLD AUTO: 46.7 % (ref 37.5–51)
HGB BLD-MCNC: 15 G/DL (ref 13–17.7)
IMM GRANULOCYTES # BLD AUTO: 0.03 10*3/MM3 (ref 0–0.05)
IMM GRANULOCYTES NFR BLD AUTO: 0.3 % (ref 0–0.5)
LYMPHOCYTES # BLD AUTO: 3.63 10*3/MM3 (ref 0.7–3.1)
LYMPHOCYTES NFR BLD AUTO: 40 % (ref 19.6–45.3)
Lab: ABNORMAL
Lab: ABNORMAL
MCH RBC QN AUTO: 29.6 PG (ref 26.6–33)
MCHC RBC AUTO-ENTMCNC: 32.1 G/DL (ref 31.5–35.7)
MCV RBC AUTO: 92.1 FL (ref 79–97)
MONOCYTES # BLD AUTO: 0.68 10*3/MM3 (ref 0.1–0.9)
MONOCYTES NFR BLD AUTO: 7.5 % (ref 5–12)
NEUTROPHILS NFR BLD AUTO: 4.46 10*3/MM3 (ref 1.7–7)
NEUTROPHILS NFR BLD AUTO: 49.1 % (ref 42.7–76)
PLATELET # BLD AUTO: 230 10*3/MM3 (ref 140–450)
PMV BLD AUTO: 11 FL (ref 6–12)
POTASSIUM SERPL-SCNC: 4.1 MMOL/L (ref 3.5–5.2)
PROT SERPL-MCNC: 7.6 G/DL (ref 6–8.5)
PSA SERPL-MCNC: 1.26 NG/ML (ref 0–4)
RBC # BLD AUTO: 5.07 10*6/MM3 (ref 4.14–5.8)
SODIUM SERPL-SCNC: 141 MMOL/L (ref 136–145)
TESTOST SERPL-MCNC: 120 NG/DL (ref 193–740)
WBC NRBC COR # BLD AUTO: 9.08 10*3/MM3 (ref 3.4–10.8)

## 2024-12-17 PROCEDURE — 84403 ASSAY OF TOTAL TESTOSTERONE: CPT | Performed by: INTERNAL MEDICINE

## 2024-12-17 PROCEDURE — 85025 COMPLETE CBC W/AUTO DIFF WBC: CPT | Performed by: INTERNAL MEDICINE

## 2024-12-17 PROCEDURE — G0103 PSA SCREENING: HCPCS | Performed by: INTERNAL MEDICINE

## 2024-12-17 PROCEDURE — 80053 COMPREHEN METABOLIC PANEL: CPT | Performed by: INTERNAL MEDICINE

## 2024-12-17 PROCEDURE — 84402 ASSAY OF FREE TESTOSTERONE: CPT | Performed by: INTERNAL MEDICINE

## 2024-12-17 NOTE — ASSESSMENT & PLAN NOTE
Blood sugar and 90 day average sugar reviewed  Results for orders placed or performed in visit on 12/17/24   POC Glucose, Blood    Collection Time: 12/17/24  8:29 AM    Specimen: Blood   Result Value Ref Range    Glucose 135 (A) 70 - 130 mg/dL    Lot Number [id number]     Expiration Date 08/08/2025    POC Glycosylated Hemoglobin (Hb A1C)    Collection Time: 12/17/24  8:33 AM    Specimen: Blood   Result Value Ref Range    Hemoglobin A1C 5.8 (A) 4.5 - 5.7 %    Lot Number [id number]     Expiration Date 09/16/2026      Average sugar is 110   Doing well overall  Continue monitoring, ozempic   Is utd with eye exam  No neuropathy or callus   Ur alb neg  F/u 3-4 months

## 2024-12-17 NOTE — PROGRESS NOTES
Buzz Elmore 64 y.o.  CC: follow up II Diabetes, Hypertension, Hyperlipidemia    Teller: follow up II Diabetes, Hypertension, Hyperlipidemia    BP is good   Blood sugar and 90 day average sugar reviewed    Results for orders placed or performed in visit on 12/17/24   POC Glucose, Blood    Collection Time: 12/17/24  8:29 AM    Specimen: Blood   Result Value Ref Range    Glucose 135 (A) 70 - 130 mg/dL    Lot Number [id number]     Expiration Date 08/08/2025    POC Glycosylated Hemoglobin (Hb A1C)    Collection Time: 12/17/24  8:33 AM    Specimen: Blood   Result Value Ref Range    Hemoglobin A1C 5.8 (A) 4.5 - 5.7 %    Lot Number [id number]     Expiration Date 09/16/2026      Is eating low fat diet, taking crestor 40 mg daily   Known hypogonadism using topical T  BP is good today     Allergies   Allergen Reactions    Atorvastatin Myalgia       Current Outpatient Medications:     aspirin 81 MG tablet, Take 1 tablet by mouth Daily., Disp: , Rfl:     carvedilol (COREG) 12.5 MG tablet, TAKE 1 TABLET BY MOUTH TWICE A DAY WITH MEALS, Disp: 180 tablet, Rfl: 3    Cholecalciferol (VITAMIN D) 2000 UNITS capsule, Take 1 capsule by mouth Daily., Disp: , Rfl:     fluticasone (FLONASE) 50 MCG/ACT nasal spray, 2 sprays by Each Nare route Daily. (Patient taking differently: 2 sprays by Each Nare route As Needed.), Disp: 48 mL, Rfl: 5    meloxicam (MOBIC) 15 MG tablet, Take 1 tablet by mouth Daily., Disp: 90 tablet, Rfl: 1    Ozempic, 2 MG/DOSE, 8 MG/3ML solution pen-injector, DIAL AND INJECT UNDER THE SKIN 2 MG WEEKLY, Disp: 3 mL, Rfl: 3    pantoprazole (PROTONIX) 40 MG EC tablet, TAKE 1 TABLET BY MOUTH DAILY, Disp: 90 tablet, Rfl: 1    prasugrel (EFFIENT) 10 MG tablet, Take 1 tablet by mouth Daily., Disp: 90 tablet, Rfl: 3    rosuvastatin (CRESTOR) 40 MG tablet, Take 1 tablet by mouth Daily., Disp: 90 tablet, Rfl: 3    tamsulosin (FLOMAX) 0.4 MG capsule 24 hr capsule, Take 1 capsule by mouth Every Night., Disp: 30 capsule, Rfl:  0    telmisartan-hydrochlorothiazide (Micardis HCT) 80-25 MG per tablet, Take 1 tablet by mouth Daily., Disp: 90 tablet, Rfl: 3    Testosterone 20.25 MG/ACT (1.62%) gel, Apply two pumps to upper arms and shoulders daily, Disp: 75 g, Rfl: 3    Patient Active Problem List    Diagnosis     Screening for prostate cancer [Z12.5]     Hypogonadotropic 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]     Family history of colon cancer [Z80.0]     Primary osteoarthritis of left knee [M17.12]     S/P total knee replacement, right [Z96.651]     Degenerative arthritis of right knee [M17.11]     Colon polyps [K63.5]     IHD (ischemic heart disease) [I25.9]     Obesity [E66.9]     Chronic low back pain [M54.50, G89.29]     Coagulation disorder [D68.9]     Mechanical complication due to tissue graft [T85.698A]     Atherosclerosis of coronary artery [I25.10]     Type 2 diabetes mellitus without complication, without long-term current use of insulin [E11.9]     Gastroesophageal reflux disease [K21.9]     Hyperlipidemia [E78.5]     Hypertension [I10]     Vitamin D deficiency [E55.9]     Acute serous otitis media [H65.00]     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   HENT:  Negative for congestion and rhinorrhea.    Eyes:  Negative for visual disturbance.   Respiratory:  Negative for cough and shortness of breath.    Cardiovascular:  Negative for palpitations and leg swelling.   Gastrointestinal:  Negative for constipation, diarrhea and nausea.   Genitourinary:  Negative for hematuria.   Musculoskeletal:  Negative for arthralgias, back pain, gait problem, joint swelling and myalgias.   Skin:  Negative for color change, rash and wound.   Allergic/Immunologic: Negative for environmental allergies, food allergies and immunocompromised  "state.   Neurological:  Negative for dizziness, weakness and light-headedness.   Psychiatric/Behavioral:  Negative for confusion, decreased concentration, dysphoric mood and sleep disturbance. The patient is not nervous/anxious.      Social History     Socioeconomic History    Marital status:    Tobacco Use    Smoking status: Never     Passive exposure: Never    Smokeless tobacco: Never   Vaping Use    Vaping status: Never Used   Substance and Sexual Activity    Alcohol use: Never    Drug use: Never    Sexual activity: Yes     Partners: Female     Birth control/protection: None, Natural family planning/Rhythm     Family History   Problem Relation Age of Onset    Breast cancer Mother     Heart disease Mother     Hypertension Mother     Heart attack Mother     Hyperlipidemia Mother     Diabetes Mother     Obesity Mother     Cancer Mother     Colon cancer Father         diagnosed in his 60s    Hypertension Father     Heart attack Father     Diabetes Father     Obesity Father     Colon cancer Sister 58    No Known Problems Sister     Heart disease Brother     No Known Problems Brother     Heart disease Paternal Grandfather      /80   Pulse 85   Ht 182.9 cm (72.01\")   Wt 116 kg (254 lb 12.8 oz)   SpO2 94%   BMI 34.55 kg/m²   Physical Exam  Vitals and nursing note reviewed.   Constitutional:       Appearance: Normal appearance. He is well-developed.   HENT:      Head: Normocephalic and atraumatic.   Eyes:      General: Lids are normal.      Extraocular Movements: Extraocular movements intact.      Conjunctiva/sclera: Conjunctivae normal.      Pupils: Pupils are equal, round, and reactive to light.   Neck:      Thyroid: No thyroid mass or thyromegaly.      Vascular: No carotid bruit.      Trachea: Trachea normal. No tracheal deviation.   Cardiovascular:      Rate and Rhythm: Normal rate and regular rhythm.      Pulses: Normal pulses.      Heart sounds: Normal heart sounds. No murmur heard.     No friction " rub. No gallop.   Pulmonary:      Effort: Pulmonary effort is normal. No respiratory distress.      Breath sounds: Normal breath sounds. No wheezing.   Musculoskeletal:         General: No deformity. Normal range of motion.      Cervical back: Normal range of motion and neck supple.   Lymphadenopathy:      Cervical: No cervical adenopathy.   Skin:     General: Skin is warm and dry.      Findings: No erythema or rash.      Nails: There is no clubbing.   Neurological:      General: No focal deficit present.      Mental Status: He is alert and oriented to person, place, and time.      Cranial Nerves: No cranial nerve deficit.      Deep Tendon Reflexes: Reflexes are normal and symmetric. Reflexes normal.   Psychiatric:         Mood and Affect: Mood normal.         Speech: Speech normal.         Behavior: Behavior normal.         Thought Content: Thought content normal.         Judgment: Judgment normal.       Results for orders placed or performed in visit on 12/17/24   POC Glucose, Blood    Collection Time: 12/17/24  8:29 AM    Specimen: Blood   Result Value Ref Range    Glucose 135 (A) 70 - 130 mg/dL    Lot Number [id number]     Expiration Date 08/08/2025    POC Glycosylated Hemoglobin (Hb A1C)    Collection Time: 12/17/24  8:33 AM    Specimen: Blood   Result Value Ref Range    Hemoglobin A1C 5.8 (A) 4.5 - 5.7 %    Lot Number [id number]     Expiration Date 09/16/2026      Diagnoses and all orders for this visit:    1. Type 2 diabetes mellitus without complication, without long-term current use of insulin (Primary)  Assessment & Plan:  Blood sugar and 90 day average sugar reviewed  Results for orders placed or performed in visit on 12/17/24   POC Glucose, Blood    Collection Time: 12/17/24  8:29 AM    Specimen: Blood   Result Value Ref Range    Glucose 135 (A) 70 - 130 mg/dL    Lot Number [id number]     Expiration Date 08/08/2025    POC Glycosylated Hemoglobin (Hb A1C)    Collection Time: 12/17/24  8:33 AM    Specimen:  Blood   Result Value Ref Range    Hemoglobin A1C 5.8 (A) 4.5 - 5.7 %    Lot Number [id number]     Expiration Date 09/16/2026      Average sugar is 110   Doing well overall  Continue monitoring, ozempic   Is utd with eye exam  No neuropathy or callus   Ur alb neg  F/u 3-4 months     Orders:  -     POC Glucose, Blood  -     POC Glycosylated Hemoglobin (Hb A1C)    2. Hypogonadotropic hypogonadism  Assessment & Plan:  Update testosterone levels  Continue supplement   Update psa      Orders:  -     Comprehensive Metabolic Panel; Future  -     CBC Auto Differential; Future  -     Testosterone; Future  -     Testosterone Free Direct; Future  -     Comprehensive Metabolic Panel  -     CBC Auto Differential  -     Testosterone  -     Testosterone Free Direct    3. Screening for prostate cancer  -     PSA Screen; Future  -     PSA Screen    Return in about 6 months (around 6/17/2025) for Recheck.    Electronically signed by: Maida Rey MD

## 2024-12-18 ENCOUNTER — OFFICE VISIT (OUTPATIENT)
Dept: INTERNAL MEDICINE | Facility: CLINIC | Age: 64
End: 2024-12-18
Payer: COMMERCIAL

## 2024-12-18 VITALS
OXYGEN SATURATION: 96 % | DIASTOLIC BLOOD PRESSURE: 86 MMHG | SYSTOLIC BLOOD PRESSURE: 142 MMHG | BODY MASS INDEX: 34 KG/M2 | HEIGHT: 72 IN | TEMPERATURE: 97.2 F | WEIGHT: 251 LBS

## 2024-12-18 DIAGNOSIS — E11.9 TYPE 2 DIABETES MELLITUS WITHOUT COMPLICATION, WITHOUT LONG-TERM CURRENT USE OF INSULIN: ICD-10-CM

## 2024-12-18 DIAGNOSIS — M17.12 PRIMARY OSTEOARTHRITIS OF LEFT KNEE: ICD-10-CM

## 2024-12-18 DIAGNOSIS — K21.9 GASTROESOPHAGEAL REFLUX DISEASE WITHOUT ESOPHAGITIS: ICD-10-CM

## 2024-12-18 DIAGNOSIS — E55.9 VITAMIN D DEFICIENCY: ICD-10-CM

## 2024-12-18 DIAGNOSIS — Z23 NEED FOR INFLUENZA VACCINATION: ICD-10-CM

## 2024-12-18 DIAGNOSIS — Z80.0 FAMILY HISTORY OF COLON CANCER: ICD-10-CM

## 2024-12-18 DIAGNOSIS — N40.1 BENIGN PROSTATIC HYPERPLASIA WITH LOWER URINARY TRACT SYMPTOMS, SYMPTOM DETAILS UNSPECIFIED: ICD-10-CM

## 2024-12-18 DIAGNOSIS — E78.2 MIXED HYPERLIPIDEMIA: ICD-10-CM

## 2024-12-18 DIAGNOSIS — I10 PRIMARY HYPERTENSION: ICD-10-CM

## 2024-12-18 DIAGNOSIS — K63.5 POLYP OF COLON, UNSPECIFIED PART OF COLON, UNSPECIFIED TYPE: ICD-10-CM

## 2024-12-18 DIAGNOSIS — I25.10 ATHEROSCLEROSIS OF NATIVE CORONARY ARTERY OF NATIVE HEART WITHOUT ANGINA PECTORIS: ICD-10-CM

## 2024-12-18 DIAGNOSIS — Z00.00 ANNUAL PHYSICAL EXAM: Primary | ICD-10-CM

## 2024-12-18 DIAGNOSIS — Z96.651 S/P TOTAL KNEE REPLACEMENT, RIGHT: ICD-10-CM

## 2024-12-18 DIAGNOSIS — R09.82 POST-NASAL DRIP: ICD-10-CM

## 2024-12-18 DIAGNOSIS — E23.0 HYPOGONADOTROPIC HYPOGONADISM: ICD-10-CM

## 2024-12-18 PROCEDURE — 99396 PREV VISIT EST AGE 40-64: CPT | Performed by: INTERNAL MEDICINE

## 2024-12-18 PROCEDURE — 90471 IMMUNIZATION ADMIN: CPT | Performed by: INTERNAL MEDICINE

## 2024-12-18 PROCEDURE — 90656 IIV3 VACC NO PRSV 0.5 ML IM: CPT | Performed by: INTERNAL MEDICINE

## 2024-12-18 RX ORDER — AZELASTINE 1 MG/ML
2 SPRAY, METERED NASAL 2 TIMES DAILY
Qty: 30 ML | Refills: 12 | Status: SHIPPED | OUTPATIENT
Start: 2024-12-18

## 2024-12-18 RX ORDER — MELOXICAM 15 MG/1
15 TABLET ORAL DAILY PRN
Qty: 90 TABLET | Refills: 3 | Status: SHIPPED | OUTPATIENT
Start: 2024-12-18

## 2024-12-18 RX ORDER — TAMSULOSIN HYDROCHLORIDE 0.4 MG/1
1 CAPSULE ORAL DAILY
Qty: 90 CAPSULE | Refills: 3 | Status: SHIPPED | OUTPATIENT
Start: 2024-12-18

## 2024-12-18 NOTE — PROGRESS NOTES
Internal Medicine Annual Exam  Buzz Elmore is a 64 y.o. male who presents today for an annual exam and with concerns as outlined below.    Chief Complaint  Chief Complaint   Patient presents with    Annual Exam        HPI  Mr. Elmore comes in today for his physical. He is doing well. Notes that he has not been taking flomax and having some difficulty urinating. He reports that he was able to urinate easier when on flomax. He continues routine care with Dr. Kitchen, Dr. Castellanos, and Dr. Rey. He is up to date with his dental exam, colonoscopy. Needs to schedule his vision exam. He will get flu shot today. Denies use of tobacco, ecigarettes, illicit drugs, and alcohol. He reports post nasal drip that has not resolved with flonase.           Review of Systems  Review of Systems   Constitutional: Negative.    HENT:  Positive for postnasal drip. Negative for hearing loss.    Eyes: Negative.    Respiratory:  Positive for cough (throat clearing). Negative for shortness of breath.    Cardiovascular: Negative.    Gastrointestinal: Negative.    Genitourinary:  Positive for decreased urine volume and difficulty urinating. Negative for dysuria, frequency and hematuria.   Musculoskeletal:  Positive for arthralgias (knee).   Skin: Negative.    Neurological: Negative.    Psychiatric/Behavioral: Negative.          Past Medical History  Past Medical History:   Diagnosis Date    Abdominal wall cellulitis 03/11/2021    Arthritis     Chronic low back pain     Coagulation disorder 06/20/2016    Coronary artery disease     GERD (gastroesophageal reflux disease)     Hyperlipidemia     Hypertension     Hypogonadotropic hypogonadism 8/6/2024    IHD (ischemic heart disease)     IHD (ischemic heart disease)     Knee swelling     Obesity     Tear of meniscus of knee     Tendinitis of knee     Type 2 diabetes mellitus without complication, without long-term current use of insulin 06/20/2016        Surgical History  Past Surgical  History:   Procedure Laterality Date    CARDIAC CATHETERIZATION      COLONOSCOPY      CORONARY STENT PLACEMENT      HAND SURGERY Left     skin graft on finger, L middle finger    KNEE ARTHROTOMY      History of Arthrotomy Of Knee With Medial Meniscectomy    TOTAL KNEE ARTHROPLASTY Right 02/14/2023    Procedure: TOTAL KNEE ARTHROPLASTY WITH CORI ROBOT - RIGHT;  Surgeon: Prince Castellanos MD;  Location: [facility];  Service: Robotics - Ortho;  Laterality: Right;        Family History  Family History   Problem Relation Age of Onset    Breast cancer Mother     Heart disease Mother     Hypertension Mother     Heart attack Mother     Hyperlipidemia Mother     Diabetes Mother     Obesity Mother     Cancer Mother     Colon cancer Father         diagnosed in his 60s    Hypertension Father     Heart attack Father     Diabetes Father     Obesity Father     Colon cancer Sister 58    No Known Problems Sister     Heart disease Brother     No Known Problems Brother     Heart disease Paternal Grandfather         Social History  Social History     Socioeconomic History    Marital status:    Tobacco Use    Smoking status: Never     Passive exposure: Never    Smokeless tobacco: Never   Vaping Use    Vaping status: Never Used   Substance and Sexual Activity    Alcohol use: Never    Drug use: Never    Sexual activity: Yes     Partners: Female     Birth control/protection: None, Natural family planning/Rhythm        Current Medications  Current Outpatient Medications on File Prior to Visit   Medication Sig Dispense Refill    aspirin 81 MG tablet Take 1 tablet by mouth Daily.      carvedilol (COREG) 12.5 MG tablet TAKE 1 TABLET BY MOUTH TWICE A DAY WITH MEALS 180 tablet 3    Cholecalciferol (VITAMIN D) 2000 UNITS capsule Take 1 capsule by mouth Daily.      fluticasone (FLONASE) 50 MCG/ACT nasal spray 2 sprays by Each Nare route Daily. (Patient taking differently: 2 sprays by Each Nare route As Needed.) 48 mL 5    meloxicam (MOBIC) 15  "MG tablet Take 1 tablet by mouth Daily. (Patient taking differently: Take 1 tablet by mouth Every Other Day.) 90 tablet 1    Ozempic, 2 MG/DOSE, 8 MG/3ML solution pen-injector DIAL AND INJECT UNDER THE SKIN 2 MG WEEKLY 3 mL 3    pantoprazole (PROTONIX) 40 MG EC tablet TAKE 1 TABLET BY MOUTH DAILY 90 tablet 1    prasugrel (EFFIENT) 10 MG tablet Take 1 tablet by mouth Daily. 90 tablet 3    rosuvastatin (CRESTOR) 40 MG tablet Take 1 tablet by mouth Daily. 90 tablet 3    telmisartan-hydrochlorothiazide (Micardis HCT) 80-25 MG per tablet Take 1 tablet by mouth Daily. 90 tablet 3    Testosterone 20.25 MG/ACT (1.62%) gel Apply two pumps to upper arms and shoulders daily 75 g 3    tamsulosin (FLOMAX) 0.4 MG capsule 24 hr capsule Take 1 capsule by mouth Every Night. (Patient not taking: Reported on 12/18/2024) 30 capsule 0     No current facility-administered medications on file prior to visit.       Allergies  Allergies   Allergen Reactions    Atorvastatin Myalgia        Objective  Visit Vitals  /86 (BP Location: Left arm, Patient Position: Sitting, Cuff Size: Adult)   Temp 97.2 °F (36.2 °C) (Temporal)   Ht 182.9 cm (72.01\")   Wt 114 kg (251 lb)   SpO2 96%   BMI 34.03 kg/m²        Physical Exam  Physical Exam  Vitals and nursing note reviewed.   Constitutional:       General: He is not in acute distress.     Appearance: He is well-developed. He is obese. He is not ill-appearing, toxic-appearing or diaphoretic.   HENT:      Head: Normocephalic and atraumatic.      Right Ear: Ear canal and external ear normal.      Left Ear: Ear canal and external ear normal.      Ears:      Comments: Tympanosclerosis bilaterally     Nose: Nose normal.      Mouth/Throat:      Mouth: Mucous membranes are moist.      Pharynx: Oropharynx is clear. Posterior oropharyngeal erythema present. No oropharyngeal exudate.   Eyes:      General: No scleral icterus.     Conjunctiva/sclera: Conjunctivae normal.   Cardiovascular:      Rate and Rhythm: " Normal rate and regular rhythm.      Heart sounds: Normal heart sounds. No murmur heard.  Pulmonary:      Effort: Pulmonary effort is normal. No respiratory distress.      Breath sounds: Normal breath sounds.   Abdominal:      General: There is no distension.      Palpations: Abdomen is soft. There is no mass.      Tenderness: There is no abdominal tenderness.   Musculoskeletal:         General: No deformity.      Cervical back: Neck supple.      Right lower leg: No edema.      Left lower leg: No edema.   Lymphadenopathy:      Cervical: No cervical adenopathy.   Skin:     General: Skin is warm and dry.      Findings: No rash.   Neurological:      Mental Status: He is alert and oriented to person, place, and time. Mental status is at baseline.      Gait: Gait normal.   Psychiatric:         Mood and Affect: Mood normal.         Behavior: Behavior normal.         Thought Content: Thought content normal.         Judgment: Judgment normal.          Results  Results for orders placed or performed in visit on 12/17/24   POC Glucose, Blood    Collection Time: 12/17/24  8:29 AM    Specimen: Blood   Result Value Ref Range    Glucose 135 (A) 70 - 130 mg/dL    Lot Number [id number]     Expiration Date 08/08/2025    POC Glycosylated Hemoglobin (Hb A1C)    Collection Time: 12/17/24  8:33 AM    Specimen: Blood   Result Value Ref Range    Hemoglobin A1C 5.8 (A) 4.5 - 5.7 %    Lot Number [id number]     Expiration Date 09/16/2026    Comprehensive Metabolic Panel    Collection Time: 12/17/24  8:52 AM    Specimen: Blood   Result Value Ref Range    Glucose 131 (H) 65 - 99 mg/dL    BUN 20 8 - 23 mg/dL    Creatinine 1.07 0.76 - 1.27 mg/dL    Sodium 141 136 - 145 mmol/L    Potassium 4.1 3.5 - 5.2 mmol/L    Chloride 102 98 - 107 mmol/L    CO2 25.2 22.0 - 29.0 mmol/L    Calcium 9.7 8.6 - 10.5 mg/dL    Total Protein 7.6 6.0 - 8.5 g/dL    Albumin 4.1 3.5 - 5.2 g/dL    ALT (SGPT) 30 1 - 41 U/L    AST (SGOT) 31 1 - 40 U/L    Alkaline Phosphatase  60 39 - 117 U/L    Total Bilirubin 0.9 0.0 - 1.2 mg/dL    Globulin 3.5 gm/dL    A/G Ratio 1.2 g/dL    BUN/Creatinine Ratio 18.7 7.0 - 25.0    Anion Gap 13.8 5.0 - 15.0 mmol/L    eGFR 78.5 >60.0 mL/min/1.73   CBC Auto Differential    Collection Time: 12/17/24  8:52 AM    Specimen: Blood   Result Value Ref Range    WBC 9.08 3.40 - 10.80 10*3/mm3    RBC 5.07 4.14 - 5.80 10*6/mm3    Hemoglobin 15.0 13.0 - 17.7 g/dL    Hematocrit 46.7 37.5 - 51.0 %    MCV 92.1 79.0 - 97.0 fL    MCH 29.6 26.6 - 33.0 pg    MCHC 32.1 31.5 - 35.7 g/dL    RDW 17.0 (H) 12.3 - 15.4 %    RDW-SD 46.8 37.0 - 54.0 fl    MPV 11.0 6.0 - 12.0 fL    Platelets 230 140 - 450 10*3/mm3    Neutrophil % 49.1 42.7 - 76.0 %    Lymphocyte % 40.0 19.6 - 45.3 %    Monocyte % 7.5 5.0 - 12.0 %    Eosinophil % 2.5 0.3 - 6.2 %    Basophil % 0.6 0.0 - 1.5 %    Immature Grans % 0.3 0.0 - 0.5 %    Neutrophils, Absolute 4.46 1.70 - 7.00 10*3/mm3    Lymphocytes, Absolute 3.63 (H) 0.70 - 3.10 10*3/mm3    Monocytes, Absolute 0.68 0.10 - 0.90 10*3/mm3    Eosinophils, Absolute 0.23 0.00 - 0.40 10*3/mm3    Basophils, Absolute 0.05 0.00 - 0.20 10*3/mm3    Immature Grans, Absolute 0.03 0.00 - 0.05 10*3/mm3   Testosterone    Collection Time: 12/17/24  8:52 AM    Specimen: Blood   Result Value Ref Range    Testosterone, Total 120.00 (L) 193.00 - 740.00 ng/dL   PSA Screen    Collection Time: 12/17/24  8:52 AM    Specimen: Blood   Result Value Ref Range    PSA 1.260 0.000 - 4.000 ng/mL        Assessment and Plan  Diagnoses and all orders for this visit:    Annual physical exam  - Counseling was given to patient for the following topics:  healthy eating habits, disease prevention, and importance of immunizations, including risks and benefits. Also discussed the importance of regular dental and vision care.     Atherosclerosis of native coronary artery of native heart without angina pectoris  - s/p PTCA/PCI to proximal RCA in 2011  - has had negative stress test in 2021 and denies  angina today  - follows with Dr. Kitchen on effient, ASA, coreg, crestor     Primary osteoarthritis of left knee  S/P total knee replacement, right  - follows with Dr. Castellanos and is s/p R TKA 2/2023 with residual pain in R knee and pain due to bone on bone arthritis in L knee  - on mobic 15mg daily PRN, taking about every other day.     Type 2 diabetes mellitus without complication, without long-term current use of insulin  - following with Dr. Rey, A1c 5.8 on ozempic 2mg weekly  - urine microalbumin negative 5/2023, repeat ordered 8/2024 but needs collected     Vitamin D deficiency  - supplementing with normal level 12/2023     Primary hypertension  - BP elevated today but controlled on home readings and at visit recently with Dr. Rey, continue telmisartan-HCTZ 80-25mg daily and coreg 12.5mg BID   - recent CMP reviewed     Mixed hyperlipidemia  - controlled on lipids 8/2024, continue crestor 40mg daily      Polyp of colon, unspecified part of colon, unspecified type  Family history of colon cancer  - had colonoscopy 1/2023 with polyp and has family hx of colon cancer in dad and sister.  - next colonoscopy in 2026     Gastroesophageal reflux disease without esophagitis  - stable, continue pantoprazole 40mg daily     Benign prostatic hyperplasia with lower urinary tract symptoms, symptom details unspecified  - having difficulty urinating and decreased urine production off flomax. Will resume flomax 0.4mg daily.    Hypogonadotropic hypogonadism   - following with Dr. Rey, on testosterone replacement    Health Maintenance   Topic Date Due    Pneumococcal Vaccine 0-64 (1 of 2 - PCV) Never done    DIABETIC FOOT EXAM  Never done    DIABETIC EYE EXAM  Never done    ZOSTER VACCINE (1 of 2) Never done    INFLUENZA VACCINE  07/01/2024    COVID-19 Vaccine (3 - 2024-25 season) 09/01/2024    ANNUAL PHYSICAL  12/11/2024    HEMOGLOBIN A1C  06/17/2025    LIPID PANEL  08/06/2025    BMI FOLLOWUP  08/29/2025     COLORECTAL CANCER SCREENING  01/09/2026    TDAP/TD VACCINES (3 - Td or Tdap) 11/01/2031    HEPATITIS C SCREENING  Completed    URINE MICROALBUMIN  Discontinued     Health Maintenance  - Colonoscopy: 1/2023, repeat 2026  - HCV: negative  - Immunizations: flu today. COVID declined. Defers PCV20 and shingrix. Tdap 2021.  - Depression screening: negative 6/2024    Return in about 6 months (around 6/18/2025) for Follow up HTN, 1 year for annual.

## 2024-12-18 NOTE — PROGRESS NOTES
Immunization  Immunization performed in left deltoid by Keyanna Quintanilla MA. Patient tolerated the procedure well without complications.  12/18/24   Keyanna Quintanilla MA

## 2024-12-19 DIAGNOSIS — E23.0 HYPOGONADOTROPIC HYPOGONADISM: ICD-10-CM

## 2024-12-19 RX ORDER — TESTOSTERONE 1.62 MG/G
GEL TRANSDERMAL
Qty: 150 G | Refills: 3 | Status: SHIPPED | OUTPATIENT
Start: 2024-12-19

## 2024-12-20 LAB — TESTOST FREE SERPL-MCNC: 2.4 PG/ML (ref 6.6–18.1)

## 2024-12-23 RX ORDER — SEMAGLUTIDE 2.68 MG/ML
2 INJECTION, SOLUTION SUBCUTANEOUS WEEKLY
Qty: 3 ML | Refills: 5 | OUTPATIENT
Start: 2024-12-23

## 2024-12-23 RX ORDER — SEMAGLUTIDE 2.68 MG/ML
2 INJECTION, SOLUTION SUBCUTANEOUS WEEKLY
Qty: 3 ML | Refills: 5 | Status: SHIPPED | OUTPATIENT
Start: 2024-12-23

## 2024-12-23 NOTE — TELEPHONE ENCOUNTER
Rx Refill Note  Requested Prescriptions     Pending Prescriptions Disp Refills    Semaglutide, 2 MG/DOSE, (Ozempic, 2 MG/DOSE,) 8 MG/3ML solution pen-injector 3 mL 3      Last office visit with prescribing clinician:    12/17/2024  Next office visit with prescribing clinician: 06/17/2025      Poornima Waller MA  12/23/24, 09:19 EST

## 2025-01-16 DIAGNOSIS — E23.0 HYPOGONADOTROPIC HYPOGONADISM: ICD-10-CM

## 2025-01-16 RX ORDER — PANTOPRAZOLE SODIUM 40 MG/1
40 TABLET, DELAYED RELEASE ORAL DAILY
Qty: 90 TABLET | Refills: 1 | Status: SHIPPED | OUTPATIENT
Start: 2025-01-16

## 2025-01-16 NOTE — TELEPHONE ENCOUNTER
Rx Refill Note  Requested Prescriptions     Pending Prescriptions Disp Refills    pantoprazole (PROTONIX) 40 MG EC tablet 90 tablet 1     Sig: Take 1 tablet by mouth Daily.      Last office visit with prescribing clinician: 12/17/2024     Next office visit with prescribing clinician: 6/17/2025     Pinky Wolf MA  01/16/25, 13:32 EST

## 2025-01-16 NOTE — TELEPHONE ENCOUNTER
Rx Refill Note  Requested Prescriptions     Pending Prescriptions Disp Refills    Testosterone 20.25 MG/ACT (1.62%) gel 150 g 3     Sig: Apply three pumps to upper arms and shoulders daily      Last office visit with prescribing clinician: 12/17/2024   Last telemedicine visit with prescribing clinician: Visit date not found   Next office visit with prescribing clinician: 6/17/2025                         Would you like a call back once the refill request has been completed: [] Yes [] No    If the office needs to give you a call back, can they leave a voicemail: [] Yes [] No    Danuta Cornejo MA  01/16/25, 13:48 EST

## 2025-01-17 RX ORDER — TESTOSTERONE 1.62 MG/G
GEL TRANSDERMAL
Qty: 150 G | Refills: 3 | Status: SHIPPED | OUTPATIENT
Start: 2025-01-17

## 2025-02-17 ENCOUNTER — OFFICE VISIT (OUTPATIENT)
Dept: ORTHOPEDIC SURGERY | Facility: CLINIC | Age: 65
End: 2025-02-17
Payer: COMMERCIAL

## 2025-02-17 VITALS
HEIGHT: 72 IN | BODY MASS INDEX: 35.46 KG/M2 | SYSTOLIC BLOOD PRESSURE: 124 MMHG | DIASTOLIC BLOOD PRESSURE: 88 MMHG | WEIGHT: 261.8 LBS

## 2025-02-17 DIAGNOSIS — M17.12 PRIMARY OSTEOARTHRITIS OF LEFT KNEE: Primary | ICD-10-CM

## 2025-02-17 RX ORDER — TRIAMCINOLONE ACETONIDE 40 MG/ML
40 INJECTION, SUSPENSION INTRA-ARTICULAR; INTRAMUSCULAR
Status: COMPLETED | OUTPATIENT
Start: 2025-02-17 | End: 2025-02-17

## 2025-02-17 RX ORDER — ROPIVACAINE HYDROCHLORIDE 5 MG/ML
4 INJECTION, SOLUTION EPIDURAL; INFILTRATION; PERINEURAL
Status: COMPLETED | OUTPATIENT
Start: 2025-02-17 | End: 2025-02-17

## 2025-02-17 RX ADMIN — ROPIVACAINE HYDROCHLORIDE 4 ML: 5 INJECTION, SOLUTION EPIDURAL; INFILTRATION; PERINEURAL at 08:17

## 2025-02-17 RX ADMIN — TRIAMCINOLONE ACETONIDE 40 MG: 40 INJECTION, SUSPENSION INTRA-ARTICULAR; INTRAMUSCULAR at 08:17

## 2025-02-17 NOTE — PROGRESS NOTES
[facility] [place] [facility] Clinic Note    Subjective     Chief Complaint   Patient presents with    Follow-up     4 month recheck- Primary osteoarthritis of left knee        HPI    It has been 4  month(s) since Mr. Elmore's last visit. He returns to clinic today for follow-up of left knee arthritis. The issue has been ongoing for 5 year(s). He rates his pain a 7/10 on the pain scale. Previous/current treatments: NSAIDS and steroid injection (last injection 10/2024). Current symptoms: same as prior visit. The pain is worse with walking; resting and pain medication and/or NSAID improve the pain. Overall, he is doing better.  He would like repeat injection today.  He may or may not be retiring in April.      I have reviewed the following portions of the patient's history and agree with: History of Present Illness and Review of Systems    Patient Active Problem List   Diagnosis    Coagulation disorder    Mechanical complication due to tissue graft    Atherosclerosis of coronary artery    Type 2 diabetes mellitus without complication, without long-term current use of insulin    Gastroesophageal reflux disease    Hyperlipidemia    Hypertension    Vitamin D deficiency    IHD (ischemic heart disease)    Obesity    Chronic low back pain    Colon polyps    Degenerative arthritis of right knee    S/P total knee replacement, right    Primary osteoarthritis of left knee    Family history of colon cancer    Mixed conductive and sensorineural hearing loss    Sensorineural hearing loss (SNHL) of both ears    Otitis media of left ear    Acute serous otitis media    Hypogonadotropic hypogonadism    Screening for prostate cancer    Benign prostatic hyperplasia with lower urinary tract symptoms     Past Medical History:   Diagnosis Date    Abdominal wall cellulitis 03/11/2021    Arthritis     Chronic low back pain     Coagulation disorder 06/20/2016    Coronary artery disease     GERD (gastroesophageal reflux disease)      Hyperlipidemia     Hypertension     Hypogonadotropic hypogonadism 8/6/2024    IHD (ischemic heart disease)     IHD (ischemic heart disease)     Knee swelling     Obesity     Tear of meniscus of knee     Tendinitis of knee     Type 2 diabetes mellitus without complication, without long-term current use of insulin 06/20/2016      Past Surgical History:   Procedure Laterality Date    CARDIAC CATHETERIZATION      COLONOSCOPY      CORONARY STENT PLACEMENT      HAND SURGERY Left     skin graft on finger, L middle finger    KNEE ARTHROTOMY      History of Arthrotomy Of Knee With Medial Meniscectomy    TOTAL KNEE ARTHROPLASTY Right 02/14/2023    Procedure: TOTAL KNEE ARTHROPLASTY WITH CORI ROBOT - RIGHT;  Surgeon: Prince Castellanos MD;  Location: [facility];  Service: Robotics - Ortho;  Laterality: Right;      Family History   Problem Relation Age of Onset    Breast cancer Mother     Heart disease Mother     Hypertension Mother     Heart attack Mother     Hyperlipidemia Mother     Diabetes Mother     Obesity Mother     Cancer Mother     Colon cancer Father         diagnosed in his 60s    Hypertension Father     Heart attack Father     Diabetes Father     Obesity Father     Colon cancer Sister 58    No Known Problems Sister     Heart disease Brother     No Known Problems Brother     Heart disease Paternal Grandfather      Social History     Socioeconomic History    Marital status:    Tobacco Use    Smoking status: Never     Passive exposure: Never    Smokeless tobacco: Never   Vaping Use    Vaping status: Never Used   Substance and Sexual Activity    Alcohol use: Never    Drug use: Never    Sexual activity: Yes     Partners: Female     Birth control/protection: None, Natural family planning/Rhythm      Current Outpatient Medications on File Prior to Visit   Medication Sig Dispense Refill    aspirin 81 MG tablet Take 1 tablet by mouth Daily.      azelastine (ASTELIN) 0.1 % nasal spray Administer 2 sprays into the  nostril(s) as directed by provider 2 (Two) Times a Day. Use in each nostril as directed 30 mL 12    carvedilol (COREG) 12.5 MG tablet TAKE 1 TABLET BY MOUTH TWICE A DAY WITH MEALS 180 tablet 3    Cholecalciferol (VITAMIN D) 2000 UNITS capsule Take 1 capsule by mouth Daily.      fluticasone (FLONASE) 50 MCG/ACT nasal spray 2 sprays by Each Nare route Daily. (Patient taking differently: 2 sprays by Each Nare route As Needed.) 48 mL 5    meloxicam (MOBIC) 15 MG tablet Take 1 tablet by mouth Daily As Needed for Mild Pain. 90 tablet 3    pantoprazole (PROTONIX) 40 MG EC tablet Take 1 tablet by mouth Daily. 90 tablet 1    prasugrel (EFFIENT) 10 MG tablet Take 1 tablet by mouth Daily. 90 tablet 3    rosuvastatin (CRESTOR) 40 MG tablet Take 1 tablet by mouth Daily. 90 tablet 3    Semaglutide, 2 MG/DOSE, (Ozempic, 2 MG/DOSE,) 8 MG/3ML solution pen-injector Inject 2 mg under the skin into the appropriate area as directed 1 (One) Time Per Week. 3 mL 5    tamsulosin (FLOMAX) 0.4 MG capsule 24 hr capsule Take 1 capsule by mouth Daily. 90 capsule 3    telmisartan-hydrochlorothiazide (Micardis HCT) 80-25 MG per tablet Take 1 tablet by mouth Daily. 90 tablet 3    Testosterone 20.25 MG/ACT (1.62%) gel Apply three pumps to upper arms and shoulders daily 150 g 3     No current facility-administered medications on file prior to visit.      Allergies   Allergen Reactions    Atorvastatin Myalgia        Review of Systems   Constitutional: Negative.  Negative for activity change, appetite change, chills, diaphoresis, fatigue, fever and unexpected weight change.   HENT: Negative.  Negative for congestion, dental problem, drooling, ear discharge, ear pain, facial swelling, hearing loss, mouth sores, nosebleeds, postnasal drip, rhinorrhea, sinus pressure, sneezing, sore throat, tinnitus, trouble swallowing and voice change.    Eyes: Negative.  Negative for photophobia, pain, discharge, redness, itching and visual disturbance.   Respiratory:  "Negative.  Negative for apnea, cough, choking, chest tightness, shortness of breath, wheezing and stridor.    Cardiovascular: Negative.  Negative for chest pain, palpitations and leg swelling.   Gastrointestinal: Negative.  Negative for abdominal distention, abdominal pain, anal bleeding, blood in stool, constipation, diarrhea, nausea, rectal pain and vomiting.   Endocrine: Negative.  Negative for cold intolerance, heat intolerance, polydipsia, polyphagia and polyuria.   Genitourinary: Negative.  Negative for decreased urine volume, difficulty urinating, dysuria, enuresis, flank pain, frequency, genital sores, hematuria and urgency.   Musculoskeletal:  Positive for arthralgias. Negative for back pain, gait problem, joint swelling, myalgias, neck pain and neck stiffness.   Skin: Negative.  Negative for color change, pallor, rash and wound.   Allergic/Immunologic: Negative.  Negative for environmental allergies, food allergies and immunocompromised state.   Neurological: Negative.  Negative for dizziness, tremors, seizures, syncope, facial asymmetry, speech difficulty, weakness, light-headedness, numbness and headaches.   Hematological: Negative.  Negative for adenopathy. Does not bruise/bleed easily.   Psychiatric/Behavioral: Negative.  Negative for agitation, behavioral problems, confusion, decreased concentration, dysphoric mood, hallucinations, self-injury, sleep disturbance and suicidal ideas. The patient is not nervous/anxious and is not hyperactive.         Objective      Physical Exam  /88   Ht 182.9 cm (72.01\")   Wt 119 kg (261 lb 12.8 oz)   BMI 35.50 kg/m²     Body mass index is 35.5 kg/m².         General:   Mental Status:  Alert   Appearance: Cooperative, in no acute distress   Build and Nutrition: Well-nourished well-developed male   Orientation: Alert and oriented to person, place and time   Posture: Normal   Gait: Nonantalgic    Integument:   Left knee: No skin lesions, no rash, no " ecchymosis    Lower Extremities:   Left Knee:    Tenderness:  Medial joint line tenderness    Effusion:  None    Swelling:  None    Crepitus: Positive    Range of motion:  Extension: 0°       Flexion: 125°  Instability: No varus laxity, no valgus laxity, negative anterior drawer  Deformities:  Varus      Imaging/Studies  Imaging Results (Last 24 Hours)       Procedure Component Value Units Date/Time    XR Knee 4+ View Left [[id number]] Resulted: 02/17/25 0812     Updated: 02/17/25 0812    Narrative:      Left Knee Radiographs  Indication: left knee pain  Views: Standing AP's and skiers of both knees, with lateral and sunrise   views of the left knee    Comparison: 11/13/2023    Findings:    Bone-on-bone contact medial compartment, tricompartmental osteophytes,   varus alignment, no acute bony abnormalities.  No unusual bony features.    Mild worsening compared to the previous imaging.  Advanced knee arthritis.                Assessment and Plan     Diagnoses and all orders for this visit:    1. Primary osteoarthritis of left knee (Primary)  -     XR Knee 4+ View Left  -     Large Joint Arthrocentesis: L knee        1. Primary osteoarthritis of left knee          I reviewed my findings with the patient.  We discussed options for his left knee today, and he is still not interested in knee replacement surgery.  He may or may not be retiring in April.  He would like an injection today, and this was provided.  I will see him back in 4 months, but I will be happy to see him back sooner for any problems.    Procedure Note:  The potential benefits of performing a therapeutic left knee joint injection, as well as potential risks (including, but not limited to infection, swelling, pain, bleeding, bruising, nerve/blood vessel damage, skin color changes, transient elevation in blood glucose levels, and fat atrophy) were discussed with the patient.  After informed consent, timeout procedure was performed, and the skin on the left  knee was prepped with chlorhexidine soap and alcohol, after which ethyl chloride was applied to the skin at the injection site. Via the anterolateral approach, 1ml of Kenalog 40mg/ml mixed with 4ml 0.5% ropivacaine plain was injected into the knee joint.  The patient tolerated the procedure well, experiencing 100% improvement a few minutes following the injection. There were no complications.  Band-Aid was applied to the injection site. Post-procedural instructions were given to the patient and/or their caregiver.      Return in about 4 months (around 6/17/2025).      Prince Castellanos MD  02/17/25  08:26 EST    Dictated Utilizing Dragon Dictation

## 2025-02-17 NOTE — PROGRESS NOTES
Procedure   Large Joint Arthrocentesis: L knee  Date/Time: 2/17/2025 8:17 AM  Consent given by: patient  Site marked: site marked  Timeout: Immediately prior to procedure a time out was called to verify the correct patient, procedure, equipment, support staff and site/side marked as required   Supporting Documentation  Indications: pain   Procedure Details  Location: knee - L knee  Preparation: Patient was prepped and draped in the usual sterile fashion  Needle gauge: 21g.  Approach: anterolateral  Medications administered: 4 mL ropivacaine 0.5 %; 40 mg triamcinolone acetonide 40 MG/ML  Patient tolerance: patient tolerated the procedure well with no immediate complications

## 2025-04-01 ENCOUNTER — OFFICE VISIT (OUTPATIENT)
Dept: ORTHOPEDIC SURGERY | Facility: CLINIC | Age: 65
End: 2025-04-01
Payer: OTHER MISCELLANEOUS

## 2025-04-01 VITALS — WEIGHT: 261 LBS | HEIGHT: 72 IN | BODY MASS INDEX: 35.35 KG/M2

## 2025-04-01 DIAGNOSIS — M25.511 RIGHT SHOULDER PAIN, UNSPECIFIED CHRONICITY: Primary | ICD-10-CM

## 2025-04-01 DIAGNOSIS — S46.001A INJURY OF RIGHT ROTATOR CUFF, INITIAL ENCOUNTER: ICD-10-CM

## 2025-04-01 DIAGNOSIS — Z02.6 ENCOUNTER RELATED TO WORKER'S COMPENSATION CLAIM: ICD-10-CM

## 2025-04-01 NOTE — PROGRESS NOTES
[facility] [place] Orthopa[facility] Surgery Office Visit     Office Visit       Date: 04/01/2025   Patient Name: Buzz Elmore  MRN: [id number]  YOB: 1960    Referring Physician: Referring, Self     Chief Complaint:   Chief Complaint   Patient presents with   • Right Shoulder - Pain       History of Present Illness:   Buzz Elmore is a 64 y.o. male who presents with new problem of: right shoulder pain.  Onset: mechanical fall. The issue has been ongoing for 3 week(s). Pain is a 7/10 on the pain scale. Pain is described as stabbing. Associated symptoms include pain and giving way/buckling. The pain is worse with working, lying on affected side, and any movement of the joint; resting improve the pain. Previous treatments have included: NSAIDS.    Subjective   Review of Systems: Review of Systems   Constitutional:  Negative for chills, fever, unexpected weight gain and unexpected weight loss.   HENT:  Negative for congestion, postnasal drip and rhinorrhea.    Eyes:  Negative for blurred vision.   Respiratory:  Negative for shortness of breath.    Cardiovascular:  Negative for leg swelling.   Gastrointestinal:  Negative for abdominal pain, nausea and vomiting.   Genitourinary:  Negative for difficulty urinating.   Musculoskeletal:  Positive for arthralgias. Negative for gait problem, joint swelling and myalgias.   Skin:  Negative for skin lesions and wound.   Neurological:  Negative for dizziness, weakness, light-headedness and numbness.   Hematological:  Does not bruise/bleed easily.   Psychiatric/Behavioral:  Negative for depressed mood.         I have reviewed the following portions of the patient's history:History of Present Illness and review of systems.    Past Medical History:   Past Medical History:   Diagnosis Date   • Abdominal wall cellulitis 03/11/2021   • Arthritis    • Chronic low back pain    • Coagulation disorder 06/20/2016   • Coronary artery disease     • GERD (gastroesophageal reflux disease)    • Hyperlipidemia    • Hypertension    • Hypogonadotropic hypogonadism 8/6/2024   • IHD (ischemic heart disease)    • IHD (ischemic heart disease)    • Knee swelling    • Obesity    • Tear of meniscus of knee    • Tendinitis of knee    • Type 2 diabetes mellitus without complication, without long-term current use of insulin 06/20/2016       Past Surgical History:   Past Surgical History:   Procedure Laterality Date   • CARDIAC CATHETERIZATION     • COLONOSCOPY     • CORONARY STENT PLACEMENT     • HAND SURGERY Left     skin graft on finger, L middle finger   • KNEE ARTHROTOMY      History of Arthrotomy Of Knee With Medial Meniscectomy   • TOTAL KNEE ARTHROPLASTY Right 02/14/2023    Procedure: TOTAL KNEE ARTHROPLASTY WITH CORI ROBOT - RIGHT;  Surgeon: Prince Castellanos MD;  Location: [facility];  Service: Robotics - Ortho;  Laterality: Right;       Family History:   Family History   Problem Relation Age of Onset   • Breast cancer Mother    • Heart disease Mother    • Hypertension Mother    • Heart attack Mother    • Hyperlipidemia Mother    • Diabetes Mother    • Obesity Mother    • Cancer Mother    • Colon cancer Father         diagnosed in his 60s   • Hypertension Father    • Heart attack Father    • Diabetes Father    • Obesity Father    • Colon cancer Sister 58   • No Known Problems Sister    • Heart disease Brother    • No Known Problems Brother    • Heart disease Paternal Grandfather        Social History:   Social History     Socioeconomic History   • Marital status:    Tobacco Use   • Smoking status: Never     Passive exposure: Never   • Smokeless tobacco: Never   Vaping Use   • Vaping status: Never Used   Substance and Sexual Activity   • Alcohol use: Never   • Drug use: Never   • Sexual activity: Yes     Partners: Female     Birth control/protection: None, Natural family planning/Rhythm       Medications:   Current Outpatient Medications:   •  aspirin 81 MG  "tablet, Take 1 tablet by mouth Daily., Disp: , Rfl:   •  azelastine (ASTELIN) 0.1 % nasal spray, Administer 2 sprays into the nostril(s) as directed by provider 2 (Two) Times a Day. Use in each nostril as directed, Disp: 30 mL, Rfl: 12  •  carvedilol (COREG) 12.5 MG tablet, TAKE 1 TABLET BY MOUTH TWICE A DAY WITH MEALS, Disp: 180 tablet, Rfl: 3  •  Cholecalciferol (VITAMIN D) 2000 UNITS capsule, Take 1 capsule by mouth Daily., Disp: , Rfl:   •  fluticasone (FLONASE) 50 MCG/ACT nasal spray, 2 sprays by Each Nare route Daily. (Patient taking differently: 2 sprays by Each Nare route As Needed.), Disp: 48 mL, Rfl: 5  •  meloxicam (MOBIC) 15 MG tablet, Take 1 tablet by mouth Daily As Needed for Mild Pain., Disp: 90 tablet, Rfl: 3  •  pantoprazole (PROTONIX) 40 MG EC tablet, Take 1 tablet by mouth Daily., Disp: 90 tablet, Rfl: 1  •  prasugrel (EFFIENT) 10 MG tablet, Take 1 tablet by mouth Daily., Disp: 90 tablet, Rfl: 3  •  rosuvastatin (CRESTOR) 40 MG tablet, Take 1 tablet by mouth Daily., Disp: 90 tablet, Rfl: 3  •  Semaglutide, 2 MG/DOSE, (Ozempic, 2 MG/DOSE,) 8 MG/3ML solution pen-injector, Inject 2 mg under the skin into the appropriate area as directed 1 (One) Time Per Week., Disp: 3 mL, Rfl: 5  •  tamsulosin (FLOMAX) 0.4 MG capsule 24 hr capsule, Take 1 capsule by mouth Daily., Disp: 90 capsule, Rfl: 3  •  telmisartan-hydrochlorothiazide (Micardis HCT) 80-25 MG per tablet, Take 1 tablet by mouth Daily., Disp: 90 tablet, Rfl: 3  •  Testosterone 20.25 MG/ACT (1.62%) gel, Apply three pumps to upper arms and shoulders daily, Disp: 150 g, Rfl: 3    Allergies:   Allergies   Allergen Reactions   • Atorvastatin Myalgia       I reviewed the patient's chief complaint, history of present illness, review of systems, past medical history, surgical history, family history, social history, medications and allergy list.     Objective    Vital Signs:   Vitals:    04/01/25 1128   Weight: 118 kg (261 lb)   Height: 182.9 cm (72\") "     Body mass index is 35.4 kg/m².   Class 2 Severe Obesity (BMI >=35 and <=39.9). Obesity-related health conditions include the following: hypertension, coronary heart disease, diabetes mellitus, and impaired fasting glucose. Obesity is newly identified. BMI is is above average; BMI management plan is completed. We discussed portion control and increasing exercise.      Patient reports that he is a non-smoker and has not ever been a smoker.  This behavior was applauded and he was encouraged to continue in smoking cessation.  We will continue to monitor at subsequent visits.    Ortho Exam:  Cardiovascular System: Arterial Pulses Right: radial normal. Arterial Pulses Left: radial normal.   C-Spine/Neck: Active Range of Motion: no crepitus or pain elicited on motion and flexion normal, extension normal, rotation normal, and lateral flexion normal.   Shoulders: Inspection Right: no misalignment, erythema, induration, swelling, or warmth and AC prominence normal. Inspection Left: no misalignment, erythema, induration, swelling, or warmth and AC prominence normal. Bony Palpation Right: tenderness of the acromioclavicular joint, the greater tuberosity, and the bicipital groove and no tenderness of the clavicle. Soft Tissue Palpation Right: tenderness of the supraspinatus, the infraspinatus, the glenohumeral joint region, and the lateral cuff insertion. Active Range of Motion Right: limited. Special Tests Right: Hawkin's test positive and empty can sign positive.   exam RIGHT shoulder: forward flexion approximately 70 degrees. Abduction 70 degrees. Internal rotation SI. Motor testing supraspinatus 4/5  exam LEFT shoulder: forward flexion approximately 140 degrees. Abduction 140 degrees. Internal rotation L1. Motor testing supraspinatus 5/5    Results Review:   Imaging Results (Last 24 Hours)       Procedure Component Value Units Date/Time    XR Shoulder 2+ View Right [[id number]] Resulted: 04/01/25 1125     Updated:  04/01/25 1131        I personally reviewed and interpreted radiographs of the right shoulder from 4/1/2025.  No acute fracture or dislocation.  No evidence of calcific tendinitis.  Mild degenerative changes of the glenohumeral and AC joints with small inferior humeral head osteophyte.  Sclerosis noted at the greater tuberosity suggestive of underlying rotator cuff disease.    Procedures    Assessment / Plan    Assessment/Plan:   Diagnoses and all orders for this visit:    1. Right shoulder pain, unspecified chronicity (Primary)  -     XR Shoulder 2+ View Right    2. Encounter related to worker's compensation claim    3. Injury of right rotator cuff, initial encounter  -     MRI Shoulder Right Without Contrast; Future    Plan:  Worker's Compensation case.  Threw a heavy 30 pound strap and felt up painful pop in his right lateral shoulder.  Has had pain, decreased range of motion ever since.  Radiographs of the right shoulder suggestive of rotator cuff injury.  Recommend MRI right shoulder to evaluate for full-thickness rotator cuff injury.  This will aid in surgical planning.  Continue meloxicam.  Continue unrestricted duty as before.  Limited lifting.  No overhead lifting.  No working from an elevated height.  Gentle range of motion discussed to avoid stiffness.  Follow-up after MRI.    Previous studies reviewed: Radiographs right shoulder.  CMP.  Hemoglobin A1c.  K    Follow Up:   No follow-ups on file.      Elliot Nava MD  [facility] [place] [facility]

## 2025-04-04 RX ORDER — FLUTICASONE PROPIONATE 50 MCG
SPRAY, SUSPENSION (ML) NASAL
Qty: 16 G | Refills: 0 | Status: SHIPPED | OUTPATIENT
Start: 2025-04-04

## 2025-04-04 NOTE — TELEPHONE ENCOUNTER
Rx Refill Note  Requested Prescriptions     Pending Prescriptions Disp Refills    fluticasone (FLONASE) 50 MCG/ACT nasal spray [Pharmacy Med Name: FLUTICASONE PROP 50 MCG SPRAY] 16 g 0     Sig: SPRAY 2 SPRAYS IN EACH NOSTRIL ONCE DAILY      Last office visit with prescribing clinician: 12/17/2024   Last telemedicine visit with prescribing clinician: Visit date not found   Next office visit with prescribing clinician: 6/17/2025                         Would you like a call back once the refill request has been completed: [] Yes [] No    If the office needs to give you a call back, can they leave a voicemail: [] Yes [] No    Penny Douglas MA  04/04/25, 10:22 EDT

## 2025-04-17 ENCOUNTER — HOSPITAL ENCOUNTER (OUTPATIENT)
Facility: HOSPITAL | Age: 65
Discharge: HOME | End: 2025-04-17
Admitting: STUDENT IN AN ORGANIZED HEALTH CARE EDUCATION/TRAINING PROGRAM
Payer: OTHER MISCELLANEOUS

## 2025-04-17 DIAGNOSIS — S46.001A INJURY OF RIGHT ROTATOR CUFF, INITIAL ENCOUNTER: ICD-10-CM

## 2025-04-17 PROCEDURE — 73221 MRI JOINT UPR EXTREM W/O DYE: CPT

## 2025-04-29 ENCOUNTER — OFFICE VISIT (OUTPATIENT)
Dept: ORTHOPEDIC SURGERY | Facility: CLINIC | Age: 65
End: 2025-04-29

## 2025-04-29 VITALS
SYSTOLIC BLOOD PRESSURE: 138 MMHG | HEIGHT: 72 IN | BODY MASS INDEX: 35.24 KG/M2 | DIASTOLIC BLOOD PRESSURE: 82 MMHG | WEIGHT: 260.14 LBS

## 2025-04-29 DIAGNOSIS — S46.001A INJURY OF RIGHT ROTATOR CUFF, INITIAL ENCOUNTER: ICD-10-CM

## 2025-04-29 DIAGNOSIS — Z02.6 ENCOUNTER RELATED TO WORKER'S COMPENSATION CLAIM: Primary | ICD-10-CM

## 2025-04-29 NOTE — PROGRESS NOTES
[facility] Orthopaedic Surgery Office Follow Up Visit     Office Follow Up      Date: 04/29/2025   Patient Name: Buzz Elmore  MRN: [id number]  YOB: 1960    Referring Physician: No ref. provider found     Chief Complaint:   Chief Complaint   Patient presents with    Follow-up     MRI Right shoulder 4/17/25         History of Present Illness: Buzz Elmore is a 64 y.o. male who returns to clinic today for follow up on right shoulder pain. His pain is a 6 /10 on the pain scale. Patient has tried the following previous treatments anti-inflammatories. He mentions current symptoms of same as prior . He states that these treatments have stayed the same.      Subjective     Review of Systems: Review of Systems   Constitutional:  Negative for chills, fever, unexpected weight gain and unexpected weight loss.   HENT:  Negative for congestion, postnasal drip and rhinorrhea.    Eyes:  Negative for blurred vision.   Respiratory:  Negative for shortness of breath.    Cardiovascular:  Negative for leg swelling.   Gastrointestinal:  Negative for abdominal pain, nausea and vomiting.   Genitourinary:  Negative for difficulty urinating.   Musculoskeletal:  Positive for arthralgias. Negative for gait problem, joint swelling and myalgias.   Skin:  Negative for skin lesions and wound.   Neurological:  Negative for dizziness, weakness, light-headedness and numbness.   Hematological:  Does not bruise/bleed easily.   Psychiatric/Behavioral:  Negative for depressed mood.         Medications:   Current Outpatient Medications:     aspirin 81 MG tablet, Take 1 tablet by mouth Daily., Disp: , Rfl:     azelastine (ASTELIN) 0.1 % nasal spray, Administer 2 sprays into the nostril(s) as directed by provider 2 (Two) Times a Day. Use in each nostril as directed, Disp: 30 mL, Rfl: 12    carvedilol (COREG) 12.5 MG tablet, TAKE 1 TABLET BY MOUTH TWICE A DAY WITH MEALS, Disp: 180 tablet, Rfl:  "3    Cholecalciferol (VITAMIN D) 2000 UNITS capsule, Take 1 capsule by mouth Daily., Disp: , Rfl:     fluticasone (FLONASE) 50 MCG/ACT nasal spray, SPRAY 2 SPRAYS IN EACH NOSTRIL ONCE DAILY, Disp: 16 g, Rfl: 0    meloxicam (MOBIC) 15 MG tablet, Take 1 tablet by mouth Daily As Needed for Mild Pain., Disp: 90 tablet, Rfl: 3    pantoprazole (PROTONIX) 40 MG EC tablet, Take 1 tablet by mouth Daily., Disp: 90 tablet, Rfl: 1    prasugrel (EFFIENT) 10 MG tablet, Take 1 tablet by mouth Daily., Disp: 90 tablet, Rfl: 3    rosuvastatin (CRESTOR) 40 MG tablet, Take 1 tablet by mouth Daily., Disp: 90 tablet, Rfl: 3    Semaglutide, 2 MG/DOSE, (Ozempic, 2 MG/DOSE,) 8 MG/3ML solution pen-injector, Inject 2 mg under the skin into the appropriate area as directed 1 (One) Time Per Week., Disp: 3 mL, Rfl: 5    tamsulosin (FLOMAX) 0.4 MG capsule 24 hr capsule, Take 1 capsule by mouth Daily., Disp: 90 capsule, Rfl: 3    telmisartan-hydrochlorothiazide (Micardis HCT) 80-25 MG per tablet, Take 1 tablet by mouth Daily., Disp: 90 tablet, Rfl: 3    Testosterone 20.25 MG/ACT (1.62%) gel, Apply three pumps to upper arms and shoulders daily, Disp: 150 g, Rfl: 3    Allergies:   Allergies   Allergen Reactions    Atorvastatin Myalgia       I have reviewed and updated the patient's chief complaint, history of present illness, review of systems, past medical history, surgical history, family history, social history, medications and allergy list as appropriate.     Objective      Vital Signs:   Vitals:    04/29/25 0804   BP: 138/82   Weight: 118 kg (260 lb 2.3 oz)   Height: 182.9 cm (72.01\")     Body mass index is 35.27 kg/m².  Class 2 Severe Obesity (BMI >=35 and <=39.9). Obesity-related health conditions include the following: hypertension, coronary heart disease, diabetes mellitus, and impaired fasting glucose. Obesity is newly identified. BMI is is above average; BMI management plan is completed. We discussed portion control and increasing " exercise.      Patient reports that he is a non-smoker and has not ever been a smoker.  This behavior was applauded and he was encouraged to continue in smoking cessation.  We will continue to monitor at subsequent visits.     Ortho Exam:  Cardiovascular System: Arterial Pulses Right: radial normal. Arterial Pulses Left: radial normal.   C-Spine/Neck: Active Range of Motion: no crepitus or pain elicited on motion and flexion normal, extension normal, rotation normal, and lateral flexion normal.   Shoulders: Inspection Right: no misalignment, erythema, induration, swelling, or warmth and AC prominence normal. Inspection Left: no misalignment, erythema, induration, swelling, or warmth and AC prominence normal. Bony Palpation Right: tenderness of the acromioclavicular joint, the greater tuberosity, and the bicipital groove and no tenderness of the clavicle. Soft Tissue Palpation Right: tenderness of the supraspinatus, the infraspinatus, the glenohumeral joint region, and the lateral cuff insertion. Active Range of Motion Right: limited. Special Tests Right: Hawkin's test positive and empty can sign positive.   exam RIGHT shoulder: forward flexion approximately 70 degrees. Abduction 70 degrees. Internal rotation SI. Motor testing supraspinatus 4/5  exam LEFT shoulder: forward flexion approximately 140 degrees. Abduction 140 degrees. Internal rotation L1. Motor testing supraspinatus 5/5    Results Review:   Imaging Results (Last 24 Hours)       ** No results found for the last 24 hours. **        MRI SHOULDER RIGHT WO CONTRAST     Date of Exam: 4/17/2025 3:12 PM EDT     Indication: rotator cuff tear.     Comparison: 4/1/2025 radiographs     Technique:  Routine multiplanar/multisequence images of the right shoulder were obtained without contrast administration.          Findings:  There is a full-thickness partial width tear of the distal supraspinatus at the footprint extending into the anterior infraspinatus fibers as  seen on series 9 images 18 through 21. There is focal retraction of the supraspinatus measuring up to 1.5 cm on   series 8 image 14. Background tendinopathy as well as infraspinatus delamination. There is mild subscapularis tendinopathy. No significant rotator cuff fatty muscle atrophy.     There is a tear of the intra-articular long head biceps tendon at the biceps labral anchor which is near complete. Tendon fibers are retracted to the bicipital groove. A few residual fibers may remain intact as seen on series 8 image 10.     There is no evidence of displaced glenoid labral tear or paralabral cyst formation. The glenohumeral articular cartilage is intact. No significant joint effusion.     Moderate to severe acromioclavicular joint arthritis. This includes marginal osteophyte formations which impress on the supraspinatus outlet. No os acromiale. Small joint effusion with capsular thickening.     Moderate fluid within the subacromial-subdeltoid bursa. No evidence of acute fracture. Remaining visualized soft tissues are within normal limits.     IMPRESSION:  Impression:  1.Full-thickness partial width tear of the distal supraspinatus tendon extending into the anterior infraspinatus fibers with focal retraction of the supraspinatus. Background tendinopathy.  2.Near-complete tear of the intra-articular long head biceps tendon.  3.Moderate to severe acromioclavicular joint arthritis.           Electronically Signed: Marino Pringle MD    4/22/2025 8:34 AM EDT    Workstation ID: [id number]    Procedures    Assessment / Plan      Assessment/Plan:   Diagnoses and all orders for this visit:    1. Encounter related to worker's compensation claim (Primary)    2. Injury of right rotator cuff, initial encounter    Plan:  Worker's Compensation case.  MRI reveals full-thickness supraspinatus tear with retraction.  This is causing him pain, reduced range of motion, and reduced work ability.  Less concern by biceps tendon  tear.  Recommend surgical evaluation for the supraspinatus injury.  Referral to Dr. Bejarano.  Follow-up with me as needed.    Follow Up:   Return for F/U with Darci.      Elliot Nava MD  [facility] [place] [facility]

## 2025-05-06 ENCOUNTER — OFFICE VISIT (OUTPATIENT)
Age: 65
End: 2025-05-06
Payer: OTHER MISCELLANEOUS

## 2025-05-06 VITALS
SYSTOLIC BLOOD PRESSURE: 145 MMHG | DIASTOLIC BLOOD PRESSURE: 95 MMHG | HEIGHT: 72 IN | BODY MASS INDEX: 34.54 KG/M2 | WEIGHT: 255 LBS

## 2025-05-06 DIAGNOSIS — S46.011A TRAUMATIC COMPLETE TEAR OF RIGHT ROTATOR CUFF, INITIAL ENCOUNTER: Primary | ICD-10-CM

## 2025-05-06 DIAGNOSIS — R29.818 PSEUDOPARALYSIS: ICD-10-CM
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
## 2025-05-06 DIAGNOSIS — M75.21 BICEPS TENDINITIS OF RIGHT UPPER EXTREMITY: ICD-10-CM

## 2025-05-06 DIAGNOSIS — M75.41 IMPINGEMENT SYNDROME OF RIGHT SHOULDER: ICD-10-CM

## 2025-05-06 DIAGNOSIS — M75.51 BURSITIS OF RIGHT SHOULDER: ICD-10-CM

## 2025-05-06 DIAGNOSIS — S46.119A LABRAL TEAR OF LONG HEAD OF BICEPS TENDON, INITIAL ENCOUNTER: ICD-10-CM

## 2025-05-06 DIAGNOSIS — S43.431A SUPERIOR GLENOID LABRUM LESION OF RIGHT SHOULDER, INITIAL ENCOUNTER: ICD-10-CM

## 2025-05-06 NOTE — PROGRESS NOTES
[facility] [place] [place] Orthopaedic Surgery Office Visit - Korey Bejarano MD  [facility]    Office Visit       Patient Name: Buzz Elmore    Chief Complaint:   Chief Complaint   Patient presents with    Right Shoulder - Pain     Injury to Rotator Cuff;  Worker's compensation claim          Referring Physician: Erlinda Villagomez MD, Dr. Nava  - I appreciate the referral    History of Present Illness:   Buzz Elmore is a 64 y.o. male who presents with right body part: shoulder Reason: pain.  Onset:Onset: throwing 30 pound strap over  truck . The issue has been ongoing for 3/13/25 DOI . Pain is a 7/10 on the pain scale. Pain is described as Pain Characterization: dull and aching. Associated symptoms include Symptoms: pain. The pain is worse with sleeping, working, and certain movements ; nothing improve the pain. Previous treatments have included: NSAIDS. I have reviewed the patient's history of present illness as noted/entered above.    I have reviewed the patient's past medical history, surgical history, social history, family history, medications, and allergies as noted in the electronic medical record and as noted/entered.  I have reviewed the patient's review of systems as noted/enter and updated as noted in the patient's HPI.      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 Castellanos    RIGHT SHOULDER MRI 4/17/2025:        MRI SHOULDER RIGHT WO CONTRAST     Date of Exam: 4/17/2025 3:12 PM EDT     Indication: rotator cuff tear.     Comparison: 4/1/2025 radiographs     Technique:  Routine multiplanar/multisequence images of the right shoulder were obtained without contrast administration.          Findings:  There is a full-thickness partial width tear of the distal supraspinatus at the footprint extending into the anterior infraspinatus fibers as seen on series 9 images 18 through 21. There is focal retraction of the supraspinatus measuring up to 1.5 cm on   series 8 image 14. Background tendinopathy as well as infraspinatus delamination. There is mild subscapularis tendinopathy. No significant rotator cuff fatty muscle atrophy.     There is a tear of the intra-articular long head biceps tendon at the biceps labral anchor which is near complete. Tendon fibers are retracted to the bicipital groove. A few residual fibers may remain intact as seen on series 8 image 10.     There is no evidence of displaced glenoid labral tear or paralabral cyst formation. The glenohumeral articular cartilage is intact. No significant joint effusion.     Moderate to severe acromioclavicular joint arthritis. This includes marginal osteophyte formations which impress on the supraspinatus outlet. No os acromiale. Small joint effusion with capsular thickening.     Moderate fluid within the subacromial-subdeltoid bursa. No evidence of acute fracture. Remaining visualized soft tissues are within normal limits.     IMPRESSION:  Impression:  1.Full-thickness partial width tear of the distal supraspinatus tendon extending into the anterior infraspinatus fibers with focal retraction  of the supraspinatus. Background tendinopathy.  2.Near-complete tear of the intra-articular long head biceps tendon.  3.Moderate to severe acromioclavicular joint arthritis.           Electronically Signed: Marino Pringle MD    4/22/2025 8:34 AM EDT    Workstation ID: [id number]      64 y.o. male  Body mass index is 34.58 kg/m².    Subjective   Subjective      Review of Systems   Constitutional: Negative.  Negative for chills, fatigue and fever.   HENT: Negative.  Negative for congestion and dental problem.    Eyes: Negative.  Negative for blurred vision.   Respiratory: Negative.  Negative for shortness of breath.    Cardiovascular: Negative.  Negative for leg swelling.   Gastrointestinal: Negative.  Negative for abdominal pain.   Endocrine: Negative.  Negative for polyuria.   Genitourinary: Negative.  Negative for difficulty urinating.   Musculoskeletal:  Positive for arthralgias.   Skin: Negative.    Allergic/Immunologic: Negative.    Neurological: Negative.    Hematological: Negative.  Negative for adenopathy.   Psychiatric/Behavioral: Negative.  Negative for behavioral problems.    All other systems reviewed and are negative.       Past Medical History:   Past Medical History:   Diagnosis Date    Abdominal wall cellulitis 03/11/2021    Arthritis     Chronic low back pain     Coagulation disorder 06/20/2016    Coronary artery disease     GERD (gastroesophageal reflux disease)     Hyperlipidemia     Hypertension     Hypogonadotropic hypogonadism 08/06/2024    IHD (ischemic heart disease)     IHD (ischemic heart disease)     Knee swelling     Knee hurts    Obesity     Tear of meniscus of knee     Tendinitis of knee     Knee hurts    Type 2 diabetes mellitus without complication, without long-term current use of insulin 06/20/2016       Past Surgical History:   Past Surgical History:   Procedure Laterality Date    CARDIAC CATHETERIZATION      COLONOSCOPY      CORONARY STENT PLACEMENT      HAND SURGERY Left     skin  graft on finger, L middle finger    KNEE ARTHROTOMY      History of Arthrotomy Of Knee With Medial Meniscectomy    TOTAL KNEE ARTHROPLASTY Right 02/14/2023    Procedure: TOTAL KNEE ARTHROPLASTY WITH CORI ROBOT - RIGHT;  Surgeon: Prince Castellanos MD;  Location: Pending sale to [facility];  Service: Robotics - Ortho;  Laterality: Right;       Family History:   Family History   Problem Relation Age of Onset    Breast cancer Mother     Heart disease Mother     Hypertension Mother     Heart attack Mother     Hyperlipidemia Mother     Diabetes Mother     Obesity Mother     Cancer Mother     Colon cancer Father         diagnosed in his 60s    Hypertension Father     Heart attack Father     Diabetes Father     Obesity Father     Colon cancer Sister 58    No Known Problems Sister     Heart disease Brother     No Known Problems Brother     Heart disease Paternal Grandfather        Social History:   Social History     Socioeconomic History    Marital status:    Tobacco Use    Smoking status: Never     Passive exposure: Never    Smokeless tobacco: Never   Vaping Use    Vaping status: Never Used   Substance and Sexual Activity    Alcohol use: Never    Drug use: Never    Sexual activity: Yes     Partners: Female     Birth control/protection: None, Natural family planning/Rhythm       Medications:   Current Outpatient Medications:     aspirin 81 MG tablet, Take 1 tablet by mouth Daily., Disp: , Rfl:     azelastine (ASTELIN) 0.1 % nasal spray, Administer 2 sprays into the nostril(s) as directed by provider 2 (Two) Times a Day. Use in each nostril as directed, Disp: 30 mL, Rfl: 12    carvedilol (COREG) 12.5 MG tablet, TAKE 1 TABLET BY MOUTH TWICE A DAY WITH MEALS, Disp: 180 tablet, Rfl: 3    Cholecalciferol (VITAMIN D) 2000 UNITS capsule, Take 1 capsule by mouth Daily., Disp: , Rfl:     fluticasone (FLONASE) 50 MCG/ACT nasal spray, SPRAY 2 SPRAYS IN EACH NOSTRIL ONCE DAILY, Disp: 16 g, Rfl: 0    meloxicam (MOBIC) 15 MG tablet, Take 1 tablet  "by mouth Daily As Needed for Mild Pain., Disp: 90 tablet, Rfl: 3    pantoprazole (PROTONIX) 40 MG EC tablet, Take 1 tablet by mouth Daily., Disp: 90 tablet, Rfl: 1    prasugrel (EFFIENT) 10 MG tablet, Take 1 tablet by mouth Daily., Disp: 90 tablet, Rfl: 3    rosuvastatin (CRESTOR) 40 MG tablet, Take 1 tablet by mouth Daily., Disp: 90 tablet, Rfl: 3    Semaglutide, 2 MG/DOSE, (Ozempic, 2 MG/DOSE,) 8 MG/3ML solution pen-injector, Inject 2 mg under the skin into the appropriate area as directed 1 (One) Time Per Week., Disp: 3 mL, Rfl: 5    tamsulosin (FLOMAX) 0.4 MG capsule 24 hr capsule, Take 1 capsule by mouth Daily., Disp: 90 capsule, Rfl: 3    telmisartan-hydrochlorothiazide (Micardis HCT) 80-25 MG per tablet, Take 1 tablet by mouth Daily., Disp: 90 tablet, Rfl: 3    Testosterone 20.25 MG/ACT (1.62%) gel, Apply three pumps to upper arms and shoulders daily, Disp: 150 g, Rfl: 3    Allergies:   Allergies   Allergen Reactions    Atorvastatin Myalgia       The following portions of the patient's history were reviewed and updated as appropriate: allergies, current medications, past family history, past medical history, past social history, past surgical history and problem list.        Objective    Objective      Vital Signs:   Vitals:    05/06/25 0811   BP: 145/95   Weight: 116 kg (255 lb)   Height: 182.9 cm (72.01\")       Ortho Exam:  Right shoulder shows evidence of pseudoparalysis and ability to forward flex and abduct over 90 degrees.  He has lag signs with supraspinatus testing positive Jobes testing positive Neer positive Calderon arm drop with abduction.    80/80/45    Pain limited dysrhythmic arc of motion uses his other arm to lift the right arm.  No obvious deformity for the long head of the biceps but concerned about the possibility of full thickness tearing tenderness palpation about the biceps positive speeds positive uppercut with significant anterior biceps pain and tenderness to palpation about the " rotator cuff as well.    Results Review:   Imaging Results (Last 24 Hours)       ** No results found for the last 24 hours. **          MRI Shoulder Right Without Contrast  Result Date: 4/22/2025  Impression: 1.Full-thickness partial width tear of the distal supraspinatus tendon extending into the anterior infraspinatus fibers with focal retraction of the supraspinatus. Background tendinopathy. 2.Near-complete tear of the intra-articular long head biceps tendon. 3.Moderate to severe acromioclavicular joint arthritis. Electronically Signed: Marino Pringle MD  4/22/2025 8:34 AM EDT  Workstation ID: [id number]    XR Shoulder 2+ View Right  Result Date: 4/4/2025  Impression: No acute osseous findings. Enthesopathic changes of the greater tuberosity and correlate for any rotator cuff pathology. Electronically Signed: Gavin Adam MD  4/4/2025 3:13 PM EDT  Workstation ID: [id number]      I personally reviewed and personally interpreted the imaging above.  Reviewed the images above near full-thickness tearing long head of the biceps significant biceps pathology, SLAP tearing, full-thickness supraspinatus tearing with extension into the infraspinatus.  Baseline degenerative AC joint arthritic changes-AC joint asymptomatic on clinical exam.    Procedures             Assessment / Plan      Assessment/Plan:   Problem List Items Addressed This Visit          Musculoskeletal and Injuries    Work related injury    Relevant Orders    External Facility Surgical/Procedural Request    Traumatic complete tear of right rotator cuff - Primary    Relevant Orders    External Facility Surgical/Procedural Request    Superior glenoid labrum lesion of right shoulder    Relevant Orders    External Facility Surgical/Procedural Request    Biceps tendinitis of right upper extremity    Relevant Orders    External Facility Surgical/Procedural Request    Bursitis of right shoulder    Relevant Orders    External Facility Surgical/Procedural Request     "Impingement syndrome of right shoulder    Relevant Orders    External Facility Surgical/Procedural Request       Other    Labral tear of long head of biceps tendon, initial encounter    Relevant Orders    External Facility Surgical/Procedural Request    Pseudoparalysis    Relevant Orders    External Facility Surgical/Procedural Request       RIGHT SHOULDER  Risks and benefits of continued nonoperative management versus surgical management were discussed. The patient desires to proceed with operative intervention.    Rotator cuff repair with possible biceps tenodesis and subacromial decompression with acromioplasty as indicated. Sometimes the rotator cuff tear is not repairable and may require debridement or partial repair. The procedure is routinely done arthroscopically, but sometimes a larger incision needs to be made to complete the repair in an \"open\" fashion for rare cases.    Specific risks include pain, bleeding, infection, injury to surrounding nerve and blood vessels, fracture, failure or lack of healing of rotator cuff repair, incomplete pain relief, hardware failure, potential need for additional procedures, stiffness after surgery, possible biceps deformity, and potential inability to restore range of motion and strength. Medical, anesthetic, and block complications are possible.    General anesthesia is required, sling compliance, and compliance with physical therapy and/or a surgeon guided exercise program will be very important to the recovery process.    Opioid medications are utilized for a short course after surgery for pain control.       Patient would need cardiac clearance he notes he did well with his total knee went home the same day on his surgery with Dr. Castellanos  Cardiologist asked Dr. Kitchen  Notes he said stent but years ago 14 years ago and 2 excellent stress tests he notes he is otherwise doing well and that front    Supportive wife present    Diagnoses: as noted above    Mechanism of " injury: as noted above in HPI    Treatment Plan: Surgery recommended given full-thickness tearing rotator cuff    Work status:   Work status:   Right ARM  10 lbs lifting restriction with the affected arm  No overhead lifting with the affected arm  No repetitive lift/push/pull with the affected arm      MMI: not reached    Projection for MMI: Unable to determine at this time    Prognosis: Guarded prognosis in the setting of a Workers' Compensation Injury    Long-term Impairments: Unable to determine if there will be future IMPAIRMENTS at this time, significant injury    Long-term or Permanent Restrictions: Unable to determine if there will be long-term RESTRICTIONS at this time      Right SHOULDER  Rotator cuff tear - Arthroscopic rotator cuff repair CPT code 44479  Biceps tendonitis - Arthroscopic biceps tenodesis CPT code 44660-rtevjisms possible tenotomy or debridement if he has poor quality remaining tissue or full-thickness tearing of the long head of the biceps  Shoulder impingement syndrome      Ultrasling III - a neutral rotation sling is recommended for this patient for perioperative care.  The patient was fitted for the sling and the sling was provided today.  The sling will be a critical part of the perioperative care for these diagnoses.      The patient desires to proceed with right shoulder surgery.      Follow Up: RIGHT SHOULDER, cardiac clearance, SC          Korey Bejarano MD, FAAOS  Orthopedic Surgeon, Shoulder Surgery  [facility]  Orthopedics and Sports Medicine  [address], [address]  [place] [address]    & New Location:  [facility] Location  [address], [address]  [place] [address]    05/06/25  08:53 EDT

## 2025-05-06 NOTE — TELEPHONE ENCOUNTER
Rx Refill Note  Requested Prescriptions     Pending Prescriptions Disp Refills    fluticasone (FLONASE) 50 MCG/ACT nasal spray 16 g 0          Last office visit with prescribing clinician: 12/17/2024     Next office visit with prescribing clinician: 6/17/2025         Renae Radford MA  05/06/25, 14:11 EDT

## 2025-05-07 RX ORDER — FLUTICASONE PROPIONATE 50 MCG
2 SPRAY, SUSPENSION (ML) NASAL DAILY
Qty: 16 G | Refills: 0 | Status: SHIPPED | OUTPATIENT
Start: 2025-05-07

## 2025-05-09 ENCOUNTER — TELEPHONE (OUTPATIENT)
Dept: CARDIOLOGY | Facility: CLINIC | Age: 65
End: 2025-05-09
Payer: COMMERCIAL

## 2025-05-09 NOTE — TELEPHONE ENCOUNTER
Requesting pre op/procedure risk assessment for right should scope rotator cuff repair and biceps tenodesis with Dr. Bejarano under general and regional block  Patient on Effient    Last office visit 05/13/2024  Assessment & Plan  Diagnoses and all orders for this visit:     1. Coronary artery disease involving native coronary artery of native heart without angina pectoris (Primary), stable.  No anginal complaints.  On aspirin and Effient.     2. Hypertension, unspecified type, controlled.  On beta-blocker.     3. Dyslipidemia, stable.  On statin.  Most recent LDL was controlled.        Plan:  Patient is overall stable from cardiac standpoint.  Continue current cardiac medications.  Follow-up in 1 years time or sooner if needed.      Next visit 06/16/25

## 2025-05-19 ENCOUNTER — TELEPHONE (OUTPATIENT)
Dept: ORTHOPEDIC SURGERY | Facility: CLINIC | Age: 65
End: 2025-05-19
Payer: COMMERCIAL

## 2025-05-19 NOTE — TELEPHONE ENCOUNTER
I attempted to contact amadou saha to let her know we are still needing the authorization. I left a voicemail stating this , along with our phone number , so she can contact us back. Once we have authorization , then amena can get him scheduled. I checked with the front office and they have not received it.

## 2025-06-02 ENCOUNTER — OUTSIDE FACILITY SERVICE (OUTPATIENT)
Dept: ORTHOPEDIC SURGERY | Facility: CLINIC | Age: 65
End: 2025-06-02
Payer: COMMERCIAL

## 2025-06-02 ENCOUNTER — DOCUMENTATION (OUTPATIENT)
Dept: ORTHOPEDIC SURGERY | Facility: CLINIC | Age: 65
End: 2025-06-02

## 2025-06-02 DIAGNOSIS — S46.011A TRAUMATIC COMPLETE TEAR OF RIGHT ROTATOR CUFF, INITIAL ENCOUNTER: ICD-10-CM

## 2025-06-02 DIAGNOSIS — R29.818 PSEUDOPARALYSIS: ICD-10-CM

## 2025-06-02 DIAGNOSIS — Z98.890 STATUS POST RIGHT ROTATOR CUFF REPAIR: Primary | ICD-10-CM

## 2025-06-02 DIAGNOSIS — S46.001A INJURY OF RIGHT ROTATOR CUFF, INITIAL ENCOUNTER: ICD-10-CM

## 2025-06-02 DIAGNOSIS — M25.511 RIGHT SHOULDER PAIN, UNSPECIFIED CHRONICITY: ICD-10-CM

## 2025-06-02 DIAGNOSIS — S46.119A LABRAL TEAR OF LONG HEAD OF BICEPS TENDON, INITIAL ENCOUNTER: ICD-10-CM

## 2025-06-02 RX ORDER — METHOCARBAMOL 500 MG/1
500 TABLET, FILM COATED ORAL 3 TIMES DAILY PRN
Qty: 40 TABLET | Refills: 1 | Status: SHIPPED | OUTPATIENT
Start: 2025-06-02 | End: 2025-06-16

## 2025-06-02 RX ORDER — HYDROCODONE BITARTRATE AND ACETAMINOPHEN 10; 325 MG/1; MG/1
1 TABLET ORAL EVERY 6 HOURS PRN
Qty: 28 TABLET | Refills: 0 | Status: SHIPPED | OUTPATIENT
Start: 2025-06-02 | End: 2025-06-09

## 2025-06-02 RX ORDER — ONDANSETRON 4 MG/1
4 TABLET, FILM COATED ORAL EVERY 6 HOURS PRN
Qty: 30 TABLET | Refills: 1 | Status: SHIPPED | OUTPATIENT
Start: 2025-06-02 | End: 2025-06-23

## 2025-06-02 NOTE — PROGRESS NOTES
Operative Report  LOCATION:   [facility]  [address] [place] [address]    [facility] OPERATIVE REPORT    Surgeon: Korey Bejarano MD  Assistant: CARLOS Kim     The skilled assistance of the above noted first assistant was necessary during this complex surgical procedure.  The surgical assistant assisted with every aspect of the operation including, but not limited to, proper and safe positioning of the patient, obtaining adequate surgical exposure, manipulation of surgical instruments, suture management, surgical knot tying when necessary, the continual process of hemostasis during the procedure itself in addition to surgical wound closure and removal of the patient from the operating table and returning the patient back to the [facility].  The assistance of the surgical assistant allowed me to perform the most sensitive and technical potions of this operation using 2 hands, thus enhancing efficiency and patient safety.  This would not be possible without the help of a skilled assistant familiar with the procedure and capable of safely performing the aforementioned tasks.     Date of Surgery: 6/2/2025  Shoulder: Right shoulder   Preoperative Diagnosis:  1.     Rotator cuff tear large to massive, significant tearing, pseudoparalysis- treated with arthroscopic rotator cuff repair - CPT 95054  2.     Shoulder impingement syndrome, partial tearing CA ligament - treated with arthroscopic subacromial decompression with acromioplasty - CPT 33609     Postoperative Diagnosis:  1.     SAME as preoperative diagnoses, full-thickness long head of the biceps tendon tear, biceps tendon absence     Procedure:  1.     Arthroscopic rotator cuff repair (2x2) - CPT 61575  2.     Arthroscopic subacromial decompression with acromioplasty - CPT 74549        Admission status: elective outpatient surgery  Complications: None  EBL: 10mL  Specimen:  NONE  Anesthesia: general anesthesia  Block: regional interscalene block with single shot per anesthesia  Antibiotics: weight based IV antibiotics infused prior to incision  Time out: Time out was called and the patient, side, site, and intended procedures were confirmed.  Counts: Needle and sponge counts were correct prior to closure.  Marked: The patient was marked with an indelible marker in the preoperative holding area confirming the correct side and site.     History & Physical:   A history and physical examination was completed and updated in the preoperative holding area.     Consent: The patient signed the consent form for surgery with an understanding of the risks and benefits as outlined in the clinic and in the preoperative holding area.     Risks and Benefits: Specific risks and benefits discussed included - pain, bleeding, infection, injury to nerves or blood vessels, fracture, stiffness, failure to heal, revision surgery, deformity of biceps or asymmetry, complications of the block, anesthetic complications, and medical complications associated with surgery.       Indications for surgery:  The patient has history, physical examination, and radiographic findings confirming the diagnoses.  The patient has persistent pain and functional loss unresponsive to non-operative treatment consisting of selective rest/activity modification, medications, and exercises.  MRI documented the status of the rotator cuff - rotator cuff tearing was noted.     Impingement syndrome - the patient had history and clinical exam findings consistent with shoulder impingement syndrome.  Additionally, the patient had subacromial bursitis which was encountered during the arthroscopic shoulder procedure along with partial tearing CA ligament.  Subacromial decompression with minimal acromioplasty was indicated as part of the treatment of impingement syndrome, and additionally to enhance arthroscopic visualization to enable minimally  invasive, arthroscopic rotator cuff repair.    Long head of biceps tendon tear was noted on MRI and full-thickness tearing was noted intraoperatively.  He had a significant injury he presented with pseudoparalysis likely an acute on chronic type picture with large to massive tearing.  Significant acute injury led to significant avulsion     Operative Findings:  Rotator Cuff Tissue Quality: Large avulsion, large to massive     Status of the Rotator Cuff:  Supraspinatus -large avulsion large to massive  Infraspinatus -tearing of the anterior portion     Size of Rotator Cuff Tear:  Combined supraspinatus and infraspinatus tearing -significantly retracted large to massive tearing     Rotator Cuff Repair Construct:  2x2 Transosseous Equivalent Double Row Arthroscopic Rotator Cuff Repair      Rotator Cuff Implants:  Medial Row: 2 Medial Anchors - double loaded [employer] 4.5mm Healicoil PEEK    Lateral Row: 2 Lateral Anchors - [employer]hew 5.5mm Footprint PEEK      Bone Quality: Solid bone quality with all 4 anchors  Labrum: Degenerative fraying, absence of the long head of the biceps  Biceps: Full-thickness retracted long head of biceps tearing     Humeral Head: Mild degenerative  Glenoid: Mild degenerative  Contracture: Negative  Pathological laxity: Negative  The patient did present with pseudoparalysis in the clinic.     Procedure in detail:  Regional interscalene block was completed in the preoperative area by the anesthesia team.  The patient was supine on the operative table and the anesthesia team initiated general anesthesia.  The patient was placed in a modified beach chair position with the neck secured in neutral alignment and all bony prominences were well padded.     The shoulder was assessed with an examination under anesthesia.     The operative extremity was cleaned with alcohol and then the extremity was prepped and draped in the standard fashion.  The surgical arm was placed into a well-padded arm  oliva. A standard posterior portal was created with an incision made 1 cm inferior 1 cm medial to the posterolateral acromial corner.  A blunt metal trocar and cannula were inserted into the glenohumeral joint.  The arthroscopic pump was connected and the above findings and diagnoses were noted as part of the diagnostic shoulder arthroscopy.       A spinal needle was used to localize the anterior portal position in the rotator interval. A 5.5mm plastic cannula and trocar were inserted followed by a 4.5mm shaver/cautery device.  The shaver was used for debridement in the glenohumeral joint.  The biceps tendon was absent, torn.  Debrided the residual biceps stump region.     The arthroscope and metal cannula were then removed in order to transition to the subacromial space.  The blunt metal trocar and cannula were inserted into the subacromial space and the lateral portal site identified with a spinal needle.  An 8 mm plastic cannula and trocar were inserted.  I turned my attention to the arthroscopic subacromial decompression with acromioplasty. Bursitis was noted in the subacromial space and impacted visualization of the rotator cuff.  The 4.5mm shaver/cautery device was used to clear the subacromial space until the acromion could be identified and bursal resection was completed to allow rotator cuff visualization.  The shaver was used to remove fibrous tissue from the acromial undersurface until the anterolateral and anterior medial corners of the acromion were clearly identified.  The coracoacromial ligament was not released but partial tearing of the CA ligament was noted and debrided as part of the subacromial decompression.  The shaver was used to perform a minimal acromioplasty.  The shaver/cautery device was used to perform the subacromial decompression with minimal acromioplasty.    I turned my attention to the arthroscopic biceps tenodesis. The arm was placed in a slightly external rotator position and the  elbow flexed and shoulder forward flexed into a biceps tendon repair position.  The biceps tendon repair position was achieved in order to try to maintain proper biceps tendon length-tension relationship during the repair.  Biceps tenodesis was performed.  I released the transverse humeral ligament overlying the biceps in the subacromial space above the pectoralis major.  Biceps pathology was visualized as noted above.  Once long head of the biceps tendon mobility was achieved I held it out of the way from the anterior portal and drilled for the 4.5 mm [employer] Nephew footprint PEEK anchor while protecting the biceps tendon.  A  hole was drilled and then the gold awl was utilized.  A self capturing suture passing device was utilized to pass two Ultrabraid sutures in a looped configuration/self capturing configuration.  Excellent suture capture of the biceps tendon in a self capturing looped configuration with both suture limbs.  The sutures were tensioned and no memory was noted in the construct.  The sutures were passed through the knotless anchor.  The sutures and anchor were placed through the  hole that was already placed.  The anchor was impacted into place tensioned and fully seated.  The seated anchor and sutures were dynamically tested and stable fixation was noted.  The repaired biceps tendon was tested and noted to be stable.  The residual stump was excised.      I turned my attention to the rotator cuff tear and the arthroscopic rotator cuff repair.  The torn rotator cuff tendon was grasped with a handheld grasper and assessed for mobility and integrity.  The soft tissue at the greater tuberosity insertion site was removed and a power shaver was used to create a healthy bleeding bed to enhance rotator cuff tendon healing.     Arthroscopic rotator cuff suture anchors were then inserted for the rotator cuff repair.  The two medial row arthroscopic rotator cuff repair anchors were inserted and the  sutures from the anchors were withdrawn through the anterior cannula. An arthroscopic suture passer was used to place the high strength sutures through the rotator cuff tendon in an inverted mattress fashion.  The sutures were then inserted laterally into 2 lateral knotless anchors with appropriate tensioning.  The completed arthroscopic rotator cuff repair was inspected dynamically and the arthroscopic instruments removed along with the arthroscopic fluid. The incisions were closed with nylon suture and steri-strips were placed over the incisions.  A sterile dressing was applied followed by a neutral rotation sling.  The patient tolerated the procedure well and was transported to the recovery room in satisfactory condition.          Rotator Cuff Repair - POSTOPERATIVE PLAN:  Follow-up in the office in 2 weeks with 1 view of the operative shoulder at that time  Sutures: Nylon sutures to come out in 2 weeks  DVT prophylaxis: no additional chemical DVT prophylaxis indicated, okay to resume his daily medication anticoagulants  Weightbearing: Nonweightbearing on operative extremity, no biceps loading, pendulums/elbow/wrist/hand motion encouraged  Neutral rotation sling for 3 to 4 weeks following the surgery  Physical therapy: Formal outpatient physical therapy will be provided at the 2-week appointment in the office.  Rotator cuff repair protocol    Electronically signed by Korey Bejarano MD, 06/02/25, 10:10 AM EDT.

## 2025-06-03 RX ORDER — SEMAGLUTIDE 2.68 MG/ML
INJECTION, SOLUTION SUBCUTANEOUS
Qty: 3 ML | Refills: 0 | Status: SHIPPED | OUTPATIENT
Start: 2025-06-03

## 2025-06-03 RX ORDER — FLUTICASONE PROPIONATE 50 MCG
2 SPRAY, SUSPENSION (ML) NASAL DAILY
Qty: 16 G | Refills: 0 | Status: CANCELLED | OUTPATIENT
Start: 2025-06-03

## 2025-06-03 NOTE — TELEPHONE ENCOUNTER
Rx Refill Note  Requested Prescriptions     Pending Prescriptions Disp Refills    Ozempic, 2 MG/DOSE, 8 MG/3ML solution pen-injector [Pharmacy Med Name: OZEMPIC 2 MG/DOSE (8 MG/3 ML)] 3 mL 5     Sig: DIAL AND INJECT UNDER THE SKIN 2 MG WEEKLY      Last office visit with prescribing clinician: 12/17/2024   Last telemedicine visit with prescribing clinician: Visit date not found   Next office visit with prescribing clinician: 6/3/2025                         Would you like a call back once the refill request has been completed: [] Yes [] No    If the office needs to give you a call back, can they leave a voicemail: [] Yes [] No    Danuta Cornejo MA  06/03/25, 09:39 EDT

## 2025-06-05 RX ORDER — FLUTICASONE PROPIONATE 50 MCG
2 SPRAY, SUSPENSION (ML) NASAL DAILY
Qty: 16 G | Refills: 0 | Status: CANCELLED | OUTPATIENT
Start: 2025-06-05

## 2025-06-09 ENCOUNTER — OFFICE VISIT (OUTPATIENT)
Dept: INTERNAL MEDICINE | Facility: CLINIC | Age: 65
End: 2025-06-09
Payer: COMMERCIAL

## 2025-06-09 ENCOUNTER — LAB (OUTPATIENT)
Dept: LAB | Facility: HOSPITAL | Age: 65
End: 2025-06-09
Payer: COMMERCIAL

## 2025-06-09 VITALS
DIASTOLIC BLOOD PRESSURE: 94 MMHG | TEMPERATURE: 97.8 F | WEIGHT: 252.6 LBS | OXYGEN SATURATION: 96 % | BODY MASS INDEX: 34.21 KG/M2 | HEART RATE: 76 BPM | SYSTOLIC BLOOD PRESSURE: 142 MMHG | HEIGHT: 72 IN

## 2025-06-09 DIAGNOSIS — E23.0 HYPOGONADOTROPIC HYPOGONADISM: ICD-10-CM

## 2025-06-09 DIAGNOSIS — I10 PRIMARY HYPERTENSION: ICD-10-CM

## 2025-06-09 DIAGNOSIS — I10 PRIMARY HYPERTENSION: Primary | ICD-10-CM

## 2025-06-09 DIAGNOSIS — Z98.890 STATUS POST RIGHT ROTATOR CUFF REPAIR: ICD-10-CM

## 2025-06-09 LAB
BASOPHILS # BLD AUTO: 0.02 10*3/MM3 (ref 0–0.2)
BASOPHILS NFR BLD AUTO: 0.3 % (ref 0–1.5)
DEPRECATED RDW RBC AUTO: 48.9 FL (ref 37–54)
EOSINOPHIL # BLD AUTO: 0.11 10*3/MM3 (ref 0–0.4)
EOSINOPHIL NFR BLD AUTO: 1.6 % (ref 0.3–6.2)
ERYTHROCYTE [DISTWIDTH] IN BLOOD BY AUTOMATED COUNT: 18.6 % (ref 12.3–15.4)
HCT VFR BLD AUTO: 40.2 % (ref 37.5–51)
HGB BLD-MCNC: 14.5 G/DL (ref 13–17.7)
IMM GRANULOCYTES # BLD AUTO: 0.03 10*3/MM3 (ref 0–0.05)
IMM GRANULOCYTES NFR BLD AUTO: 0.4 % (ref 0–0.5)
LYMPHOCYTES # BLD AUTO: 2.31 10*3/MM3 (ref 0.7–3.1)
LYMPHOCYTES NFR BLD AUTO: 33.9 % (ref 19.6–45.3)
MCH RBC QN AUTO: 35.5 PG (ref 26.6–33)
MCHC RBC AUTO-ENTMCNC: 36.1 G/DL (ref 31.5–35.7)
MCV RBC AUTO: 98.5 FL (ref 79–97)
MONOCYTES # BLD AUTO: 0.43 10*3/MM3 (ref 0.1–0.9)
MONOCYTES NFR BLD AUTO: 6.3 % (ref 5–12)
NEUTROPHILS NFR BLD AUTO: 3.92 10*3/MM3 (ref 1.7–7)
NEUTROPHILS NFR BLD AUTO: 57.5 % (ref 42.7–76)
NRBC BLD AUTO-RTO: 0 /100 WBC (ref 0–0.2)
PLATELET # BLD AUTO: 175 10*3/MM3 (ref 140–450)
PMV BLD AUTO: 10.5 FL (ref 6–12)
RBC # BLD AUTO: 4.08 10*6/MM3 (ref 4.14–5.8)
TESTOST SERPL-MCNC: 150 NG/DL (ref 193–740)
WBC NRBC COR # BLD AUTO: 6.82 10*3/MM3 (ref 3.4–10.8)

## 2025-06-09 PROCEDURE — 84402 ASSAY OF FREE TESTOSTERONE: CPT

## 2025-06-09 PROCEDURE — 84403 ASSAY OF TOTAL TESTOSTERONE: CPT

## 2025-06-09 PROCEDURE — 85025 COMPLETE CBC W/AUTO DIFF WBC: CPT

## 2025-06-09 PROCEDURE — 99214 OFFICE O/P EST MOD 30 MIN: CPT | Performed by: INTERNAL MEDICINE

## 2025-06-09 PROCEDURE — 80053 COMPREHEN METABOLIC PANEL: CPT

## 2025-06-09 RX ORDER — TESTOSTERONE 1.62 MG/G
GEL TRANSDERMAL
Qty: 150 G | Refills: 0 | Status: SHIPPED | OUTPATIENT
Start: 2025-06-09

## 2025-06-09 RX ORDER — FLUTICASONE PROPIONATE 50 MCG
2 SPRAY, SUSPENSION (ML) NASAL DAILY
Qty: 16 G | Refills: 0 | Status: SHIPPED | OUTPATIENT
Start: 2025-06-09

## 2025-06-09 NOTE — PROGRESS NOTES
Internal Medicine Follow Up    Chief Complaint  Buzz Elmore is a 64 y.o. male who presents today for follow up of chronic medical conditions outlined below.    Chief Complaint   Patient presents with    Hypertension        HPI  Mr. Elmore comes in today for follow up. BP reported to have been controlled on home readings. He did have rotator cuff repair last Monday and has been unable to get his regular knee injection so in some pain today. He has hydrocodone-APAP from surgery but has not needed it the last two days because his shoulder pain is improving. He is going to be able to have knee injection on 7/7. His RUE is immobilized in a sling which is affecting his mood. He is unable to fish or drive is tractor.  He is not sleeping well and feels anxious. He declines any intervention today. He has an upcoming appt with Dr. Rey. He also is scheduled next week for a postop appt with orthopedics.      Follow up  Symptoms include: joint pain.   Pertinent negative symptoms include no abdominal pain, no anorexia, no change in stool, no chest pain, no chills, no congestion, no cough, no diaphoresis, no fatigue, no fever, no headaches, no joint swelling, no myalgias, no nausea, no neck pain, no numbness, no rash, no sore throat, no swollen glands, no dysuria, no vertigo, no visual change, no vomiting and no weakness.        Review of Systems  Review of Systems   Constitutional:  Negative for chills, diaphoresis, fatigue and fever.   HENT:  Negative for congestion, sore throat and swollen glands.    Respiratory:  Negative for cough.    Cardiovascular:  Negative for chest pain.   Gastrointestinal:  Negative for abdominal pain, anorexia, constipation, nausea and vomiting.   Genitourinary:  Negative for decreased urine volume, difficulty urinating and dysuria.   Musculoskeletal:  Positive for arthralgias and joint pain. Negative for myalgias and neck pain.   Skin:  Negative for rash.   Neurological:  Negative for  vertigo, weakness and numbness.   Psychiatric/Behavioral:  Positive for sleep disturbance. The patient is nervous/anxious.         Current Medications  Current Outpatient Medications on File Prior to Visit   Medication Sig Dispense Refill    aspirin 81 MG tablet Take 1 tablet by mouth Daily.      azelastine (ASTELIN) 0.1 % nasal spray Administer 2 sprays into the nostril(s) as directed by provider 2 (Two) Times a Day. Use in each nostril as directed 30 mL 12    carvedilol (COREG) 12.5 MG tablet TAKE 1 TABLET BY MOUTH TWICE A DAY WITH MEALS 180 tablet 3    Cholecalciferol (VITAMIN D) 2000 UNITS capsule Take 1 capsule by mouth Daily.      fluticasone (FLONASE) 50 MCG/ACT nasal spray Administer 2 sprays into the nostril(s) as directed by provider Daily. 16 g 0    HYDROcodone-acetaminophen (NORCO)  MG per tablet Take 1 tablet by mouth Every 6 (Six) Hours As Needed for Severe Pain for up to 7 days. 28 tablet 0    meloxicam (MOBIC) 15 MG tablet Take 1 tablet by mouth Daily As Needed for Mild Pain. 90 tablet 3    methocarbamol (ROBAXIN) 500 MG tablet Take 1 tablet by mouth 3 (Three) Times a Day As Needed for Muscle Spasms for up to 14 days. 40 tablet 1    ondansetron (Zofran) 4 MG tablet Take 1 tablet by mouth Every 6 (Six) Hours As Needed for Nausea or Vomiting for up to 8 days. 30 tablet 1    pantoprazole (PROTONIX) 40 MG EC tablet Take 1 tablet by mouth Daily. 90 tablet 1    prasugrel (EFFIENT) 10 MG tablet Take 1 tablet by mouth Daily. 90 tablet 3    rosuvastatin (CRESTOR) 40 MG tablet Take 1 tablet by mouth Daily. 90 tablet 3    Semaglutide, 2 MG/DOSE, (Ozempic, 2 MG/DOSE,) 8 MG/3ML solution pen-injector DIAL AND INJECT UNDER THE SKIN 2 MG WEEKLY 3 mL 0    tamsulosin (FLOMAX) 0.4 MG capsule 24 hr capsule Take 1 capsule by mouth Daily. 90 capsule 3    telmisartan-hydrochlorothiazide (Micardis HCT) 80-25 MG per tablet Take 1 tablet by mouth Daily. 90 tablet 3    Testosterone 20.25 MG/ACT (1.62%) gel Apply three  "pumps to upper arms and shoulders daily 150 g 3     No current facility-administered medications on file prior to visit.       Allergies  Allergies   Allergen Reactions    Atorvastatin Myalgia       Objective  Visit Vitals  /94 (BP Location: Left arm, Patient Position: Sitting)   Pulse 76   Temp 97.8 °F (36.6 °C) (Temporal)   Ht 182.9 cm (72\")   Wt 115 kg (252 lb 9.6 oz)   SpO2 96%   BMI 34.26 kg/m²        Physical Exam  Physical Exam  Vitals and nursing note reviewed.   Constitutional:       General: He is not in acute distress.     Appearance: He is well-developed. He is obese. He is not ill-appearing or toxic-appearing.   HENT:      Head: Normocephalic and atraumatic.   Eyes:      Conjunctiva/sclera: Conjunctivae normal.   Cardiovascular:      Rate and Rhythm: Normal rate and regular rhythm.      Heart sounds: Normal heart sounds. No murmur heard.  Pulmonary:      Effort: Pulmonary effort is normal. No respiratory distress.      Breath sounds: Normal breath sounds.   Musculoskeletal:      Comments: RUE in sling   Skin:     General: Skin is warm and dry.   Neurological:      Mental Status: He is alert and oriented to person, place, and time. Mental status is at baseline.      Motor: No atrophy.      Gait: Gait normal.   Psychiatric:         Mood and Affect: Mood is depressed.         Behavior: Behavior normal.         Thought Content: Thought content normal.         Judgment: Judgment normal.         Results  Results for orders placed or performed in visit on 12/17/24   POC Glucose, Blood    Collection Time: 12/17/24  8:29 AM    Specimen: Blood   Result Value Ref Range    Glucose 135 (A) 70 - 130 mg/dL    Lot Number [id number]     Expiration Date 08/08/2025    POC Glycosylated Hemoglobin (Hb A1C)    Collection Time: 12/17/24  8:33 AM    Specimen: Blood   Result Value Ref Range    Hemoglobin A1C 5.8 (A) 4.5 - 5.7 %    Lot Number [id number]     Expiration Date 09/16/2026    Comprehensive Metabolic Panel    " Collection Time: 12/17/24  8:52 AM    Specimen: Blood   Result Value Ref Range    Glucose 131 (H) 65 - 99 mg/dL    BUN 20 8 - 23 mg/dL    Creatinine 1.07 0.76 - 1.27 mg/dL    Sodium 141 136 - 145 mmol/L    Potassium 4.1 3.5 - 5.2 mmol/L    Chloride 102 98 - 107 mmol/L    CO2 25.2 22.0 - 29.0 mmol/L    Calcium 9.7 8.6 - 10.5 mg/dL    Total Protein 7.6 6.0 - 8.5 g/dL    Albumin 4.1 3.5 - 5.2 g/dL    ALT (SGPT) 30 1 - 41 U/L    AST (SGOT) 31 1 - 40 U/L    Alkaline Phosphatase 60 39 - 117 U/L    Total Bilirubin 0.9 0.0 - 1.2 mg/dL    Globulin 3.5 gm/dL    A/G Ratio 1.2 g/dL    BUN/Creatinine Ratio 18.7 7.0 - 25.0    Anion Gap 13.8 5.0 - 15.0 mmol/L    eGFR 78.5 >60.0 mL/min/1.73   CBC Auto Differential    Collection Time: 12/17/24  8:52 AM    Specimen: Blood   Result Value Ref Range    WBC 9.08 3.40 - 10.80 10*3/mm3    RBC 5.07 4.14 - 5.80 10*6/mm3    Hemoglobin 15.0 13.0 - 17.7 g/dL    Hematocrit 46.7 37.5 - 51.0 %    MCV 92.1 79.0 - 97.0 fL    MCH 29.6 26.6 - 33.0 pg    MCHC 32.1 31.5 - 35.7 g/dL    RDW 17.0 (H) 12.3 - 15.4 %    RDW-SD 46.8 37.0 - 54.0 fl    MPV 11.0 6.0 - 12.0 fL    Platelets 230 140 - 450 10*3/mm3    Neutrophil % 49.1 42.7 - 76.0 %    Lymphocyte % 40.0 19.6 - 45.3 %    Monocyte % 7.5 5.0 - 12.0 %    Eosinophil % 2.5 0.3 - 6.2 %    Basophil % 0.6 0.0 - 1.5 %    Immature Grans % 0.3 0.0 - 0.5 %    Neutrophils, Absolute 4.46 1.70 - 7.00 10*3/mm3    Lymphocytes, Absolute 3.63 (H) 0.70 - 3.10 10*3/mm3    Monocytes, Absolute 0.68 0.10 - 0.90 10*3/mm3    Eosinophils, Absolute 0.23 0.00 - 0.40 10*3/mm3    Basophils, Absolute 0.05 0.00 - 0.20 10*3/mm3    Immature Grans, Absolute 0.03 0.00 - 0.05 10*3/mm3   Testosterone    Collection Time: 12/17/24  8:52 AM    Specimen: Blood   Result Value Ref Range    Testosterone, Total 120.00 (L) 193.00 - 740.00 ng/dL   Testosterone Free Direct    Collection Time: 12/17/24  8:52 AM    Specimen: Arm, Left; Blood   Result Value Ref Range    Testosterone, Free 2.4 (L) 6.6 -  18.1 pg/mL   PSA Screen    Collection Time: 12/17/24  8:52 AM    Specimen: Blood   Result Value Ref Range    PSA 1.260 0.000 - 4.000 ng/mL        Assessment and Plan  Diagnoses and all orders for this visit:    Primary hypertension  - BP elevated today but controlled on home readings and today likely influenced by pain.  - continue telmisartan-HCTZ 80-25mg daily and coreg 12.5mg BID   - CMP today    Status post right rotator cuff repair  - had surgery last week with Dr. Bejarano and in sling. Has upcoming postop visit next week.    Hypogonadotropic hypogonadism   - managed by Dr. Rey on testosterone gel  - labs ordered ahead of his appt next week    Health Maintenance  - Colonoscopy: 1/2023, repeat 2026  - HCV: negative  - Immunizations: COVID declined. Defers PCV20 and shingrix. Tdap 2021.  - Depression screening: negative 6/2024    Return for Next scheduled follow up, Labs today.

## 2025-06-09 NOTE — TELEPHONE ENCOUNTER
Rx Refill Note  Requested Prescriptions     Pending Prescriptions Disp Refills    fluticasone (FLONASE) 50 MCG/ACT nasal spray 16 g 0     Sig: Administer 2 sprays into the nostril(s) as directed by provider Daily.      Last office visit with prescribing clinician: 12/17/2024     Next office visit with prescribing clinician: 6/17/2025     Pinky Wolf MA  06/09/25, 15:21 EDT

## 2025-06-09 NOTE — TELEPHONE ENCOUNTER
Rx Refill Note  Requested Prescriptions     Pending Prescriptions Disp Refills    Testosterone 20.25 MG/ACT (1.62%) gel 150 g 3     Sig: Apply three pumps to upper arms and shoulders daily      Last office visit with prescribing clinician: Visit date not found   Last telemedicine visit with prescribing clinician: Visit date not found   Next office visit with prescribing clinician: 06/17/2025          Nadira Monteiro MA  06/09/25, 11:02 EDT

## 2025-06-10 LAB
ALBUMIN SERPL-MCNC: 4.1 G/DL (ref 3.5–5.2)
ALBUMIN/GLOB SERPL: 1.1 G/DL
ALP SERPL-CCNC: 60 U/L (ref 39–117)
ALT SERPL W P-5'-P-CCNC: 14 U/L (ref 1–41)
ANION GAP SERPL CALCULATED.3IONS-SCNC: 12.5 MMOL/L (ref 5–15)
AST SERPL-CCNC: 21 U/L (ref 1–40)
BILIRUB SERPL-MCNC: 1 MG/DL (ref 0–1.2)
BUN SERPL-MCNC: 28 MG/DL (ref 8–23)
BUN/CREAT SERPL: 24.8 (ref 7–25)
CALCIUM SPEC-SCNC: 10.1 MG/DL (ref 8.6–10.5)
CHLORIDE SERPL-SCNC: 97 MMOL/L (ref 98–107)
CO2 SERPL-SCNC: 26.5 MMOL/L (ref 22–29)
CREAT SERPL-MCNC: 1.13 MG/DL (ref 0.76–1.27)
EGFRCR SERPLBLD CKD-EPI 2021: 73.5 ML/MIN/1.73
GLOBULIN UR ELPH-MCNC: 3.6 GM/DL
GLUCOSE SERPL-MCNC: 103 MG/DL (ref 65–99)
POTASSIUM SERPL-SCNC: 4.2 MMOL/L (ref 3.5–5.2)
PROT SERPL-MCNC: 7.7 G/DL (ref 6–8.5)
SODIUM SERPL-SCNC: 136 MMOL/L (ref 136–145)

## 2025-06-13 LAB — TESTOST FREE SERPL-MCNC: 3.2 PG/ML (ref 6.6–18.1)

## 2025-06-13 NOTE — PROGRESS NOTES
[facility] Cardiology  Subjective:     Encounter Date: 06/16/2025      Patient ID: Buzz Elmore is a 64 y.o. male.    Chief Complaint: Coronary Artery Disease f/u       PROBLEM LIST:  Ischemic heart disease  [facility] for ACS, 07/28/2011: PTCA/PCI to proximal RCA. EF 60%.  Stress Test, 12/02/2016: EF > 70%. No ischemia  MPS 09/17/2021: EF > 70%. Normal study.   Hyperlipidemia  Hypertension  Borderline DM  Chronic low back pain  GERD  Obesity  Surgeries:  Right TKR  Left hand surgery  Right rotator cuff repair       History of Present Illness  Buzz Elmore returns today for a follow up with a history of CAD and cardiac risk factors. Since last visit, patient has been doing well overall from a cardiovascular standpoint. Patient recently had right rotator cuff repair and is starting PT soon. Patient denies chest pain, shortness of breath, orthopnea, palpitations, edema, dizziness, and syncope.      Allergies   Allergen Reactions    Atorvastatin Myalgia         Current Outpatient Medications:     aspirin 81 MG tablet, Take 1 tablet by mouth Daily., Disp: , Rfl:     azelastine (ASTELIN) 0.1 % nasal spray, Administer 2 sprays into the nostril(s) as directed by provider 2 (Two) Times a Day. Use in each nostril as directed, Disp: 30 mL, Rfl: 12    carvedilol (COREG) 12.5 MG tablet, TAKE 1 TABLET BY MOUTH TWICE A DAY WITH MEALS, Disp: 180 tablet, Rfl: 3    Cholecalciferol (VITAMIN D) 2000 UNITS capsule, Take 1 capsule by mouth Daily., Disp: , Rfl:     fluticasone (FLONASE) 50 MCG/ACT nasal spray, Administer 2 sprays into the nostril(s) as directed by provider Daily., Disp: 16 g, Rfl: 0    meloxicam (MOBIC) 15 MG tablet, Take 1 tablet by mouth Daily As Needed for Mild Pain., Disp: 90 tablet, Rfl: 3    methocarbamol (ROBAXIN) 500 MG tablet, Take 1 tablet by mouth 3 (Three) Times a Day As Needed for Muscle Spasms for up to 14 days., Disp: 40 tablet, Rfl: 1    ondansetron (Zofran) 4 MG  "tablet, Take 1 tablet by mouth Every 6 (Six) Hours As Needed for Nausea or Vomiting for up to 8 days., Disp: 30 tablet, Rfl: 1    pantoprazole (PROTONIX) 40 MG EC tablet, Take 1 tablet by mouth Daily., Disp: 90 tablet, Rfl: 1    prasugrel (EFFIENT) 10 MG tablet, Take 1 tablet by mouth Daily., Disp: 90 tablet, Rfl: 3    rosuvastatin (CRESTOR) 40 MG tablet, Take 1 tablet by mouth Daily., Disp: 90 tablet, Rfl: 3    Semaglutide, 2 MG/DOSE, (Ozempic, 2 MG/DOSE,) 8 MG/3ML solution pen-injector, DIAL AND INJECT UNDER THE SKIN 2 MG WEEKLY, Disp: 3 mL, Rfl: 0    tamsulosin (FLOMAX) 0.4 MG capsule 24 hr capsule, Take 1 capsule by mouth Daily., Disp: 90 capsule, Rfl: 3    telmisartan-hydrochlorothiazide (Micardis HCT) 80-25 MG per tablet, Take 1 tablet by mouth Daily., Disp: 90 tablet, Rfl: 3    Testosterone 20.25 MG/ACT (1.62%) gel, Apply three pumps to upper arms and shoulders daily, Disp: 150 g, Rfl: 0    The following portions of the patient's history were reviewed and updated as appropriate: allergies, current medications, past family history, past medical history, past social history, past surgical history and problem list.    ROS       Objective:     Vitals:    06/16/25 1520   BP: 122/80   BP Location: Left arm   Patient Position: Sitting   Cuff Size: Adult   Pulse: 80   SpO2: 98%   Weight: 112 kg (247 lb)   Height: 182.9 cm (72.01\")         Vitals reviewed.   Constitutional:       Appearance: Well-developed and not in distress.   Neck:      Thyroid: No thyromegaly.      Vascular: No carotid bruit or JVD.   Pulmonary:      Breath sounds: Normal breath sounds.   Cardiovascular:      Regular rhythm.      No gallop. No S3 and S4 gallop.   Pulses:     Intact distal pulses.      Carotid: 2+ bilaterally.     Radial: 2+ bilaterally.  Edema:     Peripheral edema absent.   Abdominal:      General: Bowel sounds are normal.      Palpations: Abdomen is soft. There is no abdominal mass.      Tenderness: There is no abdominal " tenderness.   Musculoskeletal:         General: No deformity.      Extremities: No clubbing present.Skin:     General: Skin is warm and dry.      Findings: No rash.   Neurological:      Mental Status: Alert and oriented to person, place, and time.         Lab Review:  Lab Results   Component Value Date    GLUCOSE 103 (H) 06/09/2025    BUN 28.0 (H) 06/09/2025    CREATININE 1.13 06/09/2025    BCR 24.8 06/09/2025    K 4.2 06/09/2025    CO2 26.5 06/09/2025    CALCIUM 10.1 06/09/2025    ALBUMIN 4.1 06/09/2025    ALKPHOS 60 06/09/2025    AST 21 06/09/2025    ALT 14 06/09/2025     Lab Results   Component Value Date    CHOL 107 08/06/2024    TRIG 129 08/06/2024    HDL 34 (L) 08/06/2024    LDL 50 08/06/2024      Lab Results   Component Value Date    WBC 6.82 06/09/2025    RBC 4.08 (L) 06/09/2025    HGB 14.5 06/09/2025    HCT 40.2 06/09/2025    MCV 98.5 (H) 06/09/2025     06/09/2025     Lab Results   Component Value Date    TSH 1.850 08/06/2024     Lab Results   Component Value Date    HGBA1C 5.8 (A) 12/17/2024          ECG 12 Lead    Date/Time: 6/16/2025 3:45 PM  Performed by: Db Kitchen MD    Authorized by: Db Kitchen MD  Comparison: compared with previous ECG from 2/8/2023  Rhythm: sinus rhythm  BPM: 80  Other findings: poor R wave progression    Clinical impression: abnormal EKG            Advance Care Planning              Assessment:   Diagnoses and all orders for this visit:    1. Coronary artery disease involving native coronary artery of native heart without angina pectoris (Primary)    2. Essential hypertension    3. Dyslipidemia    Other orders  -     ECG 12 Lead        Impression:  1. Coronary artery disease. Stable without angina on current activity. Continue on aspirin 81 mg and Effient 10 mg for antiplatelet therapy.    2. Essential hypertension. Well controlled. Continue on carvedilol 12.5 mg BID for hypertension. Continue telmisartan-hctz 80-25 mg daily for hypertension.     3.  Dyslipidemia. Well controlled. Continue on rosuvastatin 40 mg daily for hyperlipidemia.     Plan:  Stable cardiac status.   Continue current medications.  Revisit in 12 MO, or sooner as needed.        Scribed for Db Kitchen MD by Jamaica Chen. 6/16/2025  16:27 EDT  Db Kitchen MD      Please note that portions of this note may have been completed with a voice recognition program. Efforts were made to edit the dictations, but occasionally words are mistranscribed.

## 2025-06-16 ENCOUNTER — OFFICE VISIT (OUTPATIENT)
Dept: CARDIOLOGY | Facility: CLINIC | Age: 65
End: 2025-06-16
Payer: COMMERCIAL

## 2025-06-16 VITALS
BODY MASS INDEX: 33.46 KG/M2 | OXYGEN SATURATION: 98 % | SYSTOLIC BLOOD PRESSURE: 122 MMHG | HEIGHT: 72 IN | HEART RATE: 80 BPM | DIASTOLIC BLOOD PRESSURE: 80 MMHG | WEIGHT: 247 LBS

## 2025-06-16 DIAGNOSIS — I10 ESSENTIAL HYPERTENSION: ICD-10-CM

## 2025-06-16 DIAGNOSIS — I25.10 CORONARY ARTERY DISEASE INVOLVING NATIVE CORONARY ARTERY OF NATIVE HEART WITHOUT ANGINA PECTORIS: Primary | ICD-10-CM

## 2025-06-16 DIAGNOSIS — E78.5 DYSLIPIDEMIA: ICD-10-CM

## 2025-06-16 PROCEDURE — 99213 OFFICE O/P EST LOW 20 MIN: CPT | Performed by: INTERNAL MEDICINE

## 2025-06-16 PROCEDURE — 93000 ELECTROCARDIOGRAM COMPLETE: CPT | Performed by: INTERNAL MEDICINE

## 2025-06-17 ENCOUNTER — OFFICE VISIT (OUTPATIENT)
Dept: ENDOCRINOLOGY | Facility: CLINIC | Age: 65
End: 2025-06-17
Payer: COMMERCIAL

## 2025-06-17 ENCOUNTER — OFFICE VISIT (OUTPATIENT)
Age: 65
End: 2025-06-17
Payer: OTHER MISCELLANEOUS

## 2025-06-17 VITALS
DIASTOLIC BLOOD PRESSURE: 72 MMHG | HEART RATE: 76 BPM | HEIGHT: 72 IN | SYSTOLIC BLOOD PRESSURE: 120 MMHG | BODY MASS INDEX: 33.46 KG/M2 | WEIGHT: 247 LBS

## 2025-06-17 VITALS — TEMPERATURE: 97.6 F

## 2025-06-17 DIAGNOSIS — E78.5 DYSLIPIDEMIA: ICD-10-CM

## 2025-06-17 DIAGNOSIS — E55.9 VITAMIN D DEFICIENCY: ICD-10-CM

## 2025-06-17 DIAGNOSIS — E11.9 TYPE 2 DIABETES MELLITUS WITHOUT COMPLICATION, WITHOUT LONG-TERM CURRENT USE OF INSULIN: Primary | ICD-10-CM

## 2025-06-17 DIAGNOSIS — I10 ESSENTIAL HYPERTENSION: ICD-10-CM

## 2025-06-17 DIAGNOSIS — Z98.890 S/P RIGHT ROTATOR CUFF REPAIR: Primary | ICD-10-CM

## 2025-06-17 DIAGNOSIS — Z09 SURGERY FOLLOW-UP: ICD-10-CM

## 2025-06-17 LAB
ALBUMIN SERPL-MCNC: 4.2 G/DL (ref 3.5–5.2)
ALBUMIN UR-MCNC: <1.2 MG/DL
ALBUMIN/GLOB SERPL: 1.2 G/DL
ALP SERPL-CCNC: 66 U/L (ref 39–117)
ALT SERPL W P-5'-P-CCNC: 19 U/L (ref 1–41)
ANION GAP SERPL CALCULATED.3IONS-SCNC: 11.7 MMOL/L (ref 5–15)
AST SERPL-CCNC: 19 U/L (ref 1–40)
BILIRUB SERPL-MCNC: 1 MG/DL (ref 0–1.2)
BUN SERPL-MCNC: 23 MG/DL (ref 8–23)
BUN/CREAT SERPL: 20.7 (ref 7–25)
CALCIUM SPEC-SCNC: 9.9 MG/DL (ref 8.6–10.5)
CHLORIDE SERPL-SCNC: 97 MMOL/L (ref 98–107)
CHOLEST SERPL-MCNC: 123 MG/DL (ref 0–200)
CO2 SERPL-SCNC: 27.3 MMOL/L (ref 22–29)
CREAT SERPL-MCNC: 1.11 MG/DL (ref 0.76–1.27)
CREAT UR-MCNC: 140.9 MG/DL
EGFRCR SERPLBLD CKD-EPI 2021: 75.1 ML/MIN/1.73
EXPIRATION DATE: NORMAL
GLOBULIN UR ELPH-MCNC: 3.5 GM/DL
GLUCOSE BLDC GLUCOMTR-MCNC: 121 MG/DL (ref 70–130)
GLUCOSE SERPL-MCNC: 104 MG/DL (ref 65–99)
HBA1C MFR BLD: 5.7 % (ref 4.8–5.6)
HDLC SERPL-MCNC: 40 MG/DL (ref 40–60)
LDLC SERPL CALC-MCNC: 53 MG/DL (ref 0–100)
LDLC/HDLC SERPL: 1.16 {RATIO}
Lab: NORMAL
MICROALBUMIN/CREAT UR: NORMAL MG/G{CREAT}
POTASSIUM SERPL-SCNC: 3.8 MMOL/L (ref 3.5–5.2)
PROT SERPL-MCNC: 7.7 G/DL (ref 6–8.5)
SODIUM SERPL-SCNC: 136 MMOL/L (ref 136–145)
T4 FREE SERPL-MCNC: 1.36 NG/DL (ref 0.92–1.68)
TRIGL SERPL-MCNC: 184 MG/DL (ref 0–150)
TSH SERPL DL<=0.05 MIU/L-ACNC: 2.17 UIU/ML (ref 0.27–4.2)
VLDLC SERPL-MCNC: 30 MG/DL (ref 5–40)

## 2025-06-17 PROCEDURE — 83036 HEMOGLOBIN GLYCOSYLATED A1C: CPT | Performed by: INTERNAL MEDICINE

## 2025-06-17 PROCEDURE — 84439 ASSAY OF FREE THYROXINE: CPT | Performed by: INTERNAL MEDICINE

## 2025-06-17 PROCEDURE — 80053 COMPREHEN METABOLIC PANEL: CPT | Performed by: INTERNAL MEDICINE

## 2025-06-17 PROCEDURE — 84443 ASSAY THYROID STIM HORMONE: CPT | Performed by: INTERNAL MEDICINE

## 2025-06-17 PROCEDURE — 80061 LIPID PANEL: CPT | Performed by: INTERNAL MEDICINE

## 2025-06-17 PROCEDURE — 82570 ASSAY OF URINE CREATININE: CPT | Performed by: INTERNAL MEDICINE

## 2025-06-17 PROCEDURE — 82043 UR ALBUMIN QUANTITATIVE: CPT | Performed by: INTERNAL MEDICINE

## 2025-06-17 NOTE — ASSESSMENT & PLAN NOTE
Blood sugar and 90 day average sugar reviewed  Results for orders placed or performed in visit on 06/17/25   POC Glucose, Blood    Collection Time: 06/17/25  8:18 AM    Specimen: Blood   Result Value Ref Range    Glucose 121 70 - 130 mg/dL    Lot Number [id number]     Expiration Date 08/30/2025      Average sugar pending- prior good control taking ozempic 2 mg weekly   Update along with urine alb

## 2025-06-17 NOTE — PROGRESS NOTES
[facility] Follow Up       Office Follow Up Visit       Patient Name: Buzz Elmore    Chief Complaint:   Chief Complaint   Patient presents with   • Post-op     2 Week Status post right rotator cuff repair 6/2/25       Referring Physician: No ref. provider found    History of Present Illness:   Buzz Elmore returns to clinic today for 2-week postop visit s/p right arthroscopic rotator cuff repair and subacromial decompression with Dr. Bejarano. Here with supportive wife. He reports to be doing well. Taking tylenol as needed. He has been wearing sling. Off work currently. Work related injury.    Date of Surgery: 6/2/2025  Shoulder: Right shoulder   Preoperative Diagnosis:  1.     Rotator cuff tear large to massive, significant tearing, pseudoparalysis- treated with arthroscopic rotator cuff repair - CPT 97149  2.     Shoulder impingement syndrome, partial tearing CA ligament - treated with arthroscopic subacromial decompression with acromioplasty - CPT 26063     Postoperative Diagnosis:  1.     SAME as preoperative diagnoses, full-thickness long head of the biceps tendon tear, biceps tendon absence     Procedure:  1.     Arthroscopic rotator cuff repair (2x2) - CPT 82084  2.     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        I have reviewed and updated the following portions of the patient's history and review of systems: allergies, current medications, past family history, past medical history, past social history, past surgical history and problem list.    Medications:   Current Outpatient Medications:   •  aspirin 81 MG tablet, Take 1 tablet by mouth Daily., Disp: , Rfl:   •  azelastine (ASTELIN) 0.1 % nasal spray, Administer 2 sprays into the nostril(s) as directed by provider 2 (Two) Times a Day. Use in each nostril as directed, Disp: 30 mL, Rfl: 12  •  carvedilol (COREG) 12.5 MG tablet, TAKE 1 TABLET BY MOUTH TWICE A DAY WITH MEALS, Disp: 180 tablet, Rfl: 3  •  Cholecalciferol (VITAMIN D) 2000 UNITS capsule, Take 1 capsule by mouth Daily., Disp: , Rfl:   •  fluticasone (FLONASE) 50 MCG/ACT nasal spray, Administer 2 sprays into the nostril(s) as directed by provider Daily., Disp: 16 g, Rfl: 0  •  meloxicam (MOBIC) 15 MG tablet, Take 1 tablet by mouth Daily As Needed for Mild Pain., Disp: 90 tablet, Rfl: 3  •  ondansetron (Zofran) 4 MG tablet, Take 1 tablet by mouth Every 6 (Six) Hours As Needed for Nausea or Vomiting for up to 8 days., Disp: 30 tablet, Rfl: 1  •  pantoprazole (PROTONIX) 40 MG EC tablet, Take 1 tablet by mouth Daily., Disp: 90 tablet, Rfl: 1  •  prasugrel (EFFIENT) 10 MG tablet, Take 1 tablet by mouth Daily., Disp: 90 tablet, Rfl: 3  •  rosuvastatin (CRESTOR) 40 MG tablet, Take 1 tablet by mouth Daily., Disp: 90 tablet, Rfl: 3  •  Semaglutide, 2 MG/DOSE, (Ozempic, 2 MG/DOSE,) 8 MG/3ML solution pen-injector, DIAL AND INJECT UNDER THE SKIN 2 MG WEEKLY, Disp: 3 mL, Rfl: 0  •  tamsulosin (FLOMAX) 0.4 MG capsule 24 hr capsule, Take 1 capsule by mouth Daily., Disp: 90 capsule, Rfl: 3  •  telmisartan-hydrochlorothiazide (Micardis  HCT) 80-25 MG per tablet, Take 1 tablet by mouth Daily., Disp: 90 tablet, Rfl: 3  •  Testosterone 20.25 MG/ACT (1.62%) gel, Apply three pumps to upper arms and shoulders daily, Disp: 150 g, Rfl: 0    Allergies:   Allergies   Allergen Reactions   • Atorvastatin Myalgia         Objective      Vital Signs:   Vitals:    06/17/25 0932   Temp: 97.6 °F (36.4 °C)   TempSrc: Oral       Ortho Exam:  General: comfortable  Vascular: 2+ radial pulse  Neurologic: sensation to light touch is intact distally, elbow flexion/elbow extension/wrist flexion/wrist extension/hand intrinsics intact, able to fire deltoid; no residual defects noted from the block  Dermatologic: surgical incisions are well appearing, with no drainage or surrounding erythema; no signs or symptoms of infection or DVT      Results Review:  XR Shoulder 1 View Right  Right Shoulder X-Ray    Indication: Pain    Study:  Grashey AP, axillary lateral, and scapular Y views    Comparison: 4/1/2025    Findings:  No acute fractures. No bony lesions. Normal soft tissues.  Glenohumeral   joint:  no joint space narrowing.  There is no acute change from prior   imaging.    Impression:    No acute bony abnormalities noted.         Assessment / Plan      Assessment:   Diagnoses and all orders for this visit:    1. S/P right rotator cuff repair (Primary)  -     Ambulatory Referral to Physical Therapy for Evaluation & Treatment    2. Surgery follow-up  -     XR Shoulder 1 View Right  -     XR Shoulder 1 View Right  -     Ambulatory Referral to Physical Therapy for Evaluation & Treatment        Quality Metrics:   BMI:   BMI is >= 30 and <35. (Class 1 Obesity). The following options were offered after discussion;: weight loss educational material (shared in after visit summary)       Tobacco:   Buzz Elmore  reports that he has never smoked. He has never been exposed to tobacco smoke. He has never used smokeless tobacco.           Plan:  X-ray images right shoulder reviewed  today.  No acute bony findings.  He is doing well 2 weeks s/p right complex arthroscopic rotator cuff repair.  Recommend Tylenol as needed for pain.  Recommend sling use for an additional 2 weeks.  May take breaks as needed.  Avoid weightbearing activity on the right side.  Referral to physical therapy.  [facility] [place].  Protocol provided.  Sutures removed today.  Remain off work until cleared.      Follow Up:   6 weeks        Adrienne Urrutia PA-C  [facility]    Dictated using Dragon Speech Recognition.

## 2025-06-17 NOTE — PROGRESS NOTES
Buzz Elmore 64 y.o.  CC: follow up II Diabetes, Hypertension, Hyperlipidemia    Native: follow up II Diabetes, Hypertension, Hyperlipidemia    Blood sugar and 90 day average sugar reviewed  Results for orders placed or performed in visit on 06/17/25   POC Glucose, Blood    Collection Time: 06/17/25  8:18 AM    Specimen: Blood   Result Value Ref Range    Glucose 121 70 - 130 mg/dL    Lot Number [id number]     Expiration Date 08/30/2025      BP Is good   Is eating low fat diet, taking HIT statin   A1c improved with ozempic   S/p right rotator cuff surgery   Doing well overall   Dr Villagomez checked T levels- low but he has not been using supplement (due to shoulder surgery )    Allergies   Allergen Reactions    Atorvastatin Myalgia       Current Outpatient Medications:     aspirin 81 MG tablet, Take 1 tablet by mouth Daily., Disp: , Rfl:     azelastine (ASTELIN) 0.1 % nasal spray, Administer 2 sprays into the nostril(s) as directed by provider 2 (Two) Times a Day. Use in each nostril as directed, Disp: 30 mL, Rfl: 12    carvedilol (COREG) 12.5 MG tablet, TAKE 1 TABLET BY MOUTH TWICE A DAY WITH MEALS, Disp: 180 tablet, Rfl: 3    Cholecalciferol (VITAMIN D) 2000 UNITS capsule, Take 1 capsule by mouth Daily., Disp: , Rfl:     fluticasone (FLONASE) 50 MCG/ACT nasal spray, Administer 2 sprays into the nostril(s) as directed by provider Daily., Disp: 16 g, Rfl: 0    meloxicam (MOBIC) 15 MG tablet, Take 1 tablet by mouth Daily As Needed for Mild Pain., Disp: 90 tablet, Rfl: 3    ondansetron (Zofran) 4 MG tablet, Take 1 tablet by mouth Every 6 (Six) Hours As Needed for Nausea or Vomiting for up to 8 days., Disp: 30 tablet, Rfl: 1    pantoprazole (PROTONIX) 40 MG EC tablet, Take 1 tablet by mouth Daily., Disp: 90 tablet, Rfl: 1    prasugrel (EFFIENT) 10 MG tablet, Take 1 tablet by mouth Daily., Disp: 90 tablet, Rfl: 3    rosuvastatin (CRESTOR) 40 MG tablet, Take 1 tablet by mouth Daily., Disp: 90 tablet, Rfl: 3     Semaglutide, 2 MG/DOSE, (Ozempic, 2 MG/DOSE,) 8 MG/3ML solution pen-injector, DIAL AND INJECT UNDER THE SKIN 2 MG WEEKLY, Disp: 3 mL, Rfl: 0    tamsulosin (FLOMAX) 0.4 MG capsule 24 hr capsule, Take 1 capsule by mouth Daily., Disp: 90 capsule, Rfl: 3    telmisartan-hydrochlorothiazide (Micardis HCT) 80-25 MG per tablet, Take 1 tablet by mouth Daily., Disp: 90 tablet, Rfl: 3    Testosterone 20.25 MG/ACT (1.62%) gel, Apply three pumps to upper arms and shoulders daily, Disp: 150 g, Rfl: 0    Patient Active Problem List    Diagnosis     Status post right rotator cuff repair [Z98.890]     Work related injury [Y99.0]     Traumatic complete tear of right rotator cuff [S46.011A]     Labral tear of long head of biceps tendon, initial encounter [S46.119A]     Superior glenoid labrum lesion of right shoulder [S43.431A]     Biceps tendinitis of right upper extremity [M75.21]     Bursitis of right shoulder [M75.51]     Impingement syndrome of right shoulder [M75.41]     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]     Benign prostatic hyperplasia with lower urinary tract symptoms [N40.1]     Screening for prostate cancer [Z12.5]     Hypogonadotropic 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]     Family history of colon cancer [Z80.0]     Primary osteoarthritis of left knee [M17.12]     S/P total knee replacement, right [Z96.651]     Degenerative arthritis of right knee [M17.11]     Colon polyps [K63.5]     IHD (ischemic heart disease) [I25.9]     Obesity [E66.9]     Chronic low back pain [M54.50, G89.29]     Coagulation disorder [D68.9]     Mechanical complication due to tissue graft [T85.698A]     Coronary artery disease involving native coronary artery of native heart without angina pectoris [I25.10]     Type 2 diabetes mellitus without complication, without long-term current use of insulin [E11.9]     Gastroesophageal reflux disease [K21.9]     Dyslipidemia [E78.5]     Essential hypertension [I10]     Vitamin D deficiency [E55.9]     Acute serous otitis media  [H65.00]     Mixed conductive 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 Negative for appetite change and unexpected weight change.   HENT:  Negative for congestion and rhinorrhea.    Eyes:  Negative for visual disturbance.   Respiratory:  Negative for cough and shortness of breath.    Cardiovascular:  Negative for palpitations and leg swelling.   Gastrointestinal:  Negative for constipation, diarrhea and nausea.   Genitourinary:  Negative for hematuria.   Musculoskeletal:  Positive for arthralgias. Negative for back pain, gait problem, joint swelling and myalgias.   Skin:  Negative for color change, rash and wound.   Allergic/Immunologic: Negative for environmental allergies, food allergies and immunocompromised state.   Neurological:  Negative for dizziness, weakness and light-headedness.   Psychiatric/Behavioral:  Negative for confusion, decreased concentration, dysphoric mood and sleep disturbance. The patient is not nervous/anxious.      Social History     Socioeconomic History    Marital status:    Tobacco Use    Smoking status: Never     Passive exposure: Never    Smokeless tobacco: Never   Vaping Use    Vaping status: Never Used   Substance and Sexual Activity    Alcohol use: Never    Drug use: Never    Sexual activity: Yes     Partners: Female     Birth control/protection: None, Natural family planning/Rhythm     Family History   Problem Relation Age of Onset    Breast cancer Mother     Heart disease Mother     Hypertension Mother     Heart attack Mother     Hyperlipidemia Mother     Diabetes Mother     Obesity Mother     Cancer Mother     Colon cancer Father         diagnosed in his 60s    Hypertension Father     Heart attack Father     Diabetes Father     Obesity Father     Colon cancer Sister 58    No Known Problems Sister     Heart  "disease Brother     No Known Problems Brother     Heart disease Paternal Grandfather      /72   Pulse 76   Ht 182.9 cm (72.01\")   Wt 112 kg (247 lb)   BMI 33.49 kg/m²   Physical Exam  Vitals and nursing note reviewed.   Constitutional:       Appearance: Normal appearance. He is well-developed.   HENT:      Head: Normocephalic and atraumatic.   Eyes:      General: Lids are normal.      Extraocular Movements: Extraocular movements intact.      Conjunctiva/sclera: Conjunctivae normal.      Pupils: Pupils are equal, round, and reactive to light.   Neck:      Thyroid: No thyroid mass or thyromegaly.      Vascular: No carotid bruit.      Trachea: Trachea normal. No tracheal deviation.   Cardiovascular:      Rate and Rhythm: Normal rate and regular rhythm.      Heart sounds: Normal heart sounds. No murmur heard.     No friction rub. No gallop.   Pulmonary:      Effort: Pulmonary effort is normal. No respiratory distress.      Breath sounds: Normal breath sounds. No wheezing.   Musculoskeletal:         General: No deformity. Normal range of motion.      Cervical back: Normal range of motion and neck supple.   Lymphadenopathy:      Cervical: No cervical adenopathy.   Skin:     General: Skin is warm and dry.      Findings: No erythema or rash.      Nails: There is no clubbing.   Neurological:      General: No focal deficit present.      Mental Status: He is alert and oriented to person, place, and time.      Cranial Nerves: No cranial nerve deficit.      Deep Tendon Reflexes: Reflexes are normal and symmetric. Reflexes normal.   Psychiatric:         Speech: Speech normal.         Behavior: Behavior normal.         Thought Content: Thought content normal.         Judgment: Judgment normal.       Results for orders placed or performed in visit on 06/17/25   POC Glucose, Blood    Collection Time: 06/17/25  8:18 AM    Specimen: Blood   Result Value Ref Range    Glucose 121 70 - 130 mg/dL    Lot Number [id number]     " Expiration Date 08/30/2025      Diagnoses and all orders for this visit:    1. Type 2 diabetes mellitus without complication, without long-term current use of insulin (Primary)  Assessment & Plan:  Blood sugar and 90 day average sugar reviewed  Results for orders placed or performed in visit on 06/17/25   POC Glucose, Blood    Collection Time: 06/17/25  8:18 AM    Specimen: Blood   Result Value Ref Range    Glucose 121 70 - 130 mg/dL    Lot Number [id number]     Expiration Date 08/30/2025      Average sugar pending- prior good control taking ozempic 2 mg weekly   Update along with urine alb     Orders:  -     POC Glucose, Blood  -     Comprehensive Metabolic Panel; Future  -     Hemoglobin A1c; Future  -     Microalbumin / Creatinine Urine Ratio - Urine, Clean Catch; Future  -     Comprehensive Metabolic Panel  -     Hemoglobin A1c  -     Microalbumin / Creatinine Urine Ratio - Urine, Clean Catch    2. Essential hypertension  Assessment & Plan:  BP is controlled  Continue medications and monitoring       3. Dyslipidemia  Assessment & Plan:  Continue statin - check flp     Orders:  -     T4, Free; Future  -     TSH; Future  -     Lipid Panel; Future  -     T4, Free  -     TSH  -     Lipid Panel    4. Vitamin D deficiency    Return in about 4 months (around 10/17/2025) for Recheck.    Electronically signed by: Maida Rey MD

## 2025-06-19 ENCOUNTER — RESULTS FOLLOW-UP (OUTPATIENT)
Dept: ENDOCRINOLOGY | Facility: CLINIC | Age: 65
End: 2025-06-19
Payer: COMMERCIAL

## 2025-06-19 NOTE — LETTER
Buzz Elmore  [address] [facility] [address]    June 19, 2025     Dear Mr. Elmore:    Below are the results from your recent visit:    Resulted Orders   POC Glucose, Blood   Result Value Ref Range    Glucose 121 70 - 130 mg/dL    Lot Number [id number]     Expiration Date 08/30/2025    T4, Free   Result Value Ref Range    Free T4 1.36 0.92 - 1.68 ng/dL   TSH   Result Value Ref Range    TSH 2.170 0.270 - 4.200 uIU/mL   Comprehensive Metabolic Panel   Result Value Ref Range    Glucose 104 (H) 65 - 99 mg/dL    BUN 23.0 8.0 - 23.0 mg/dL    Creatinine 1.11 0.76 - 1.27 mg/dL    Sodium 136 136 - 145 mmol/L    Potassium 3.8 3.5 - 5.2 mmol/L    Chloride 97 (L) 98 - 107 mmol/L    CO2 27.3 22.0 - 29.0 mmol/L    Calcium 9.9 8.6 - 10.5 mg/dL    Total Protein 7.7 6.0 - 8.5 g/dL    Albumin 4.2 3.5 - 5.2 g/dL    ALT (SGPT) 19 1 - 41 U/L    AST (SGOT) 19 1 - 40 U/L    Alkaline Phosphatase 66 39 - 117 U/L    Total Bilirubin 1.0 0.0 - 1.2 mg/dL    Globulin 3.5 gm/dL    A/G Ratio 1.2 g/dL    BUN/Creatinine Ratio 20.7 7.0 - 25.0    Anion Gap 11.7 5.0 - 15.0 mmol/L    eGFR 75.1 >60.0 mL/min/1.73   Hemoglobin A1c   Result Value Ref Range    Hemoglobin A1C 5.70 (H) 4.80 - 5.60 %   Microalbumin / Creatinine Urine Ratio - Urine, Clean Catch   Result Value Ref Range    Microalbumin/Creatinine Ratio        Comment:      Unable to calculate    Creatinine, Urine 140.9 mg/dL    Microalbumin, Urine <1.2 mg/dL   Lipid Panel   Result Value Ref Range    Total Cholesterol 123 0 - 200 mg/dL    Triglycerides 184 (H) 0 - 150 mg/dL    HDL Cholesterol 40 40 - 60 mg/dL    LDL Cholesterol  53 0 - 100 mg/dL    VLDL Cholesterol 30 5 - 40 mg/dL    LDL/HDL Ratio 1.16        Results are stable overall- continue medications and low fat diet.  No other changes for now.      If you have any questions or concerns, please don't hesitate to call.         Sincerely,        Maida Rey MD

## 2025-06-30 RX ORDER — SEMAGLUTIDE 2.68 MG/ML
2 INJECTION, SOLUTION SUBCUTANEOUS WEEKLY
Qty: 3 ML | Refills: 0 | Status: SHIPPED | OUTPATIENT
Start: 2025-06-30

## 2025-07-07 ENCOUNTER — OFFICE VISIT (OUTPATIENT)
Dept: ORTHOPEDIC SURGERY | Facility: CLINIC | Age: 65
End: 2025-07-07
Payer: COMMERCIAL

## 2025-07-07 VITALS
BODY MASS INDEX: 34 KG/M2 | SYSTOLIC BLOOD PRESSURE: 138 MMHG | HEIGHT: 72 IN | WEIGHT: 251 LBS | DIASTOLIC BLOOD PRESSURE: 70 MMHG

## 2025-07-07 DIAGNOSIS — M17.12 PRIMARY OSTEOARTHRITIS OF LEFT KNEE: Primary | ICD-10-CM

## 2025-07-07 PROCEDURE — 20610 DRAIN/INJ JOINT/BURSA W/O US: CPT | Performed by: ORTHOPAEDIC SURGERY

## 2025-07-07 RX ORDER — TRIAMCINOLONE ACETONIDE 40 MG/ML
40 INJECTION, SUSPENSION INTRA-ARTICULAR; INTRAMUSCULAR
Status: COMPLETED | OUTPATIENT
Start: 2025-07-07 | End: 2025-07-07

## 2025-07-07 RX ORDER — FLUTICASONE PROPIONATE 50 MCG
SPRAY, SUSPENSION (ML) NASAL
Qty: 11.1 G | Refills: 3 | Status: SHIPPED | OUTPATIENT
Start: 2025-07-07

## 2025-07-07 RX ORDER — ROPIVACAINE HYDROCHLORIDE 5 MG/ML
4 INJECTION, SOLUTION EPIDURAL; INFILTRATION; PERINEURAL
Status: COMPLETED | OUTPATIENT
Start: 2025-07-07 | End: 2025-07-07

## 2025-07-07 RX ADMIN — ROPIVACAINE HYDROCHLORIDE 4 ML: 5 INJECTION, SOLUTION EPIDURAL; INFILTRATION; PERINEURAL at 13:22

## 2025-07-07 RX ADMIN — TRIAMCINOLONE ACETONIDE 40 MG: 40 INJECTION, SUSPENSION INTRA-ARTICULAR; INTRAMUSCULAR at 13:22

## 2025-07-07 NOTE — PROGRESS NOTES
Procedure   - Large Joint Arthrocentesis: L knee on 7/7/2025 1:22 PM  Indications: pain  Details: 21 G needle, anterolateral approach  Medications: 4 mL ropivacaine 0.5 %; 40 mg triamcinolone acetonide 40 MG/ML  Outcome: tolerated well, no immediate complications  Procedure, treatment alternatives, risks and benefits explained, specific risks discussed. Consent was given by the patient. Immediately prior to procedure a time out was called to verify the correct patient, procedure, equipment, support staff and site/side marked as required. Patient was prepped and draped in the usual sterile fashion.

## 2025-07-07 NOTE — TELEPHONE ENCOUNTER
Rx Refill Note  Requested Prescriptions     Pending Prescriptions Disp Refills    fluticasone (FLONASE) 50 MCG/ACT nasal spray [Pharmacy Med Name: FLUTICASONE PROP 50 MCG SPRAY]       Sig: SPRAY 2 SPRAYS IN EACH NOSTRIL ONCE DAILY      Last office visit with prescribing clinician: 6/17/2025   Last telemedicine visit with prescribing clinician: Visit date not found   Next office visit with prescribing clinician: 10/20/2025                         Would you like a call back once the refill request has been completed: [] Yes [] No    If the office needs to give you a call back, can they leave a voicemail: [] Yes [] No    Danuta Cornejo MA  07/07/25, 15:33 EDT

## 2025-07-07 NOTE — PROGRESS NOTES
[facility] [place] [facility] [place]    Subjective     Chief Complaint   Patient presents with    Follow-up     4.5 Month Follow Up - Primary osteoarthritis of left knee        HPI    It has been 4  month(s) since Mr. Elmore's last visit. He returns to clinic today for follow-up of left knee arthritis. The issue has been ongoing for 2 year(s). He rates his pain a 9/10 on the pain scale. Previous/current treatments: bracing and steroid injection (last injection 2/17/25). Current symptoms: pain and giving way/buckling. The pain is worse with walking, standing, climbing stairs, sleeping, working, leisure, lying on affected side, and any movement of the joint; ice and pain medication and/or NSAID improve the pain. Overall, he is doing worse.  Last injection provided relief.  He would like a repeat injection today.  He is also inquiring about possible viscosupplementation injections.      I have reviewed the following portions of the patient's history and agree with: History of Present Illness and Review of Systems    Patient Active Problem List   Diagnosis    Coagulation disorder    Mechanical complication due to tissue graft    Coronary artery disease involving native coronary artery of native heart without angina pectoris    Type 2 diabetes mellitus without complication, without long-term current use of insulin    Gastroesophageal reflux disease    Dyslipidemia    Essential hypertension    Vitamin D deficiency    IHD (ischemic heart disease)    Obesity    Chronic low back pain    Colon polyps    Degenerative arthritis of right knee    S/P total knee replacement, right    Primary osteoarthritis of left knee    Family history of colon cancer    Mixed conductive and sensorineural hearing loss    Sensorineural hearing loss (SNHL) of both ears    Otitis media of left ear    Acute serous otitis media    Hypogonadotropic hypogonadism    Screening for prostate cancer    Benign prostatic hyperplasia with lower urinary  tract symptoms    Work related injury    Traumatic complete tear of right rotator cuff    Labral tear of long head of biceps tendon, initial encounter    Superior glenoid labrum lesion of right shoulder    Biceps tendinitis of right upper extremity    Bursitis of right shoulder    Impingement syndrome of right shoulder    Pseudoparalysis    Status post right rotator cuff repair     Past Medical History:   Diagnosis Date    Abdominal wall cellulitis 03/11/2021    Arthritis     Chronic low back pain     Coagulation disorder 06/20/2016    Coronary artery disease     GERD (gastroesophageal reflux disease)     Hyperlipidemia     Hypertension     Hypogonadotropic hypogonadism 08/06/2024    IHD (ischemic heart disease)     IHD (ischemic heart disease)     Knee swelling     Knee hurts    Obesity     Tear of meniscus of knee     Tendinitis of knee     Knee hurts    Type 2 diabetes mellitus without complication, without long-term current use of insulin 06/20/2016      Past Surgical History:   Procedure Laterality Date    CARDIAC CATHETERIZATION      COLONOSCOPY      CORONARY STENT PLACEMENT      HAND SURGERY Left     skin graft on finger, L middle finger    JOINT REPLACEMENT      Knee replacement    KNEE ARTHROTOMY      History of Arthrotomy Of Knee With Medial Meniscectomy    SHOULDER SURGERY  March 13    [facility]    TOTAL KNEE ARTHROPLASTY Right 02/14/2023    Procedure: TOTAL KNEE ARTHROPLASTY WITH CORI ROBOT - RIGHT;  Surgeon: Prince Castellanos MD;  Location: [facility];  Service: Robotics - Ortho;  Laterality: Right;      Family History   Problem Relation Age of Onset    Breast cancer Mother     Heart disease Mother     Hypertension Mother     Heart attack Mother     Hyperlipidemia Mother     Diabetes Mother     Obesity Mother     Cancer Mother     Colon cancer Father         diagnosed in his 60s    Hypertension Father     Heart attack Father     Diabetes Father     Obesity Father     Colon cancer  Sister 58    No Known Problems Sister     Heart disease Brother     No Known Problems Brother     Heart disease Paternal Grandfather      Social History     Socioeconomic History    Marital status:    Tobacco Use    Smoking status: Never     Passive exposure: Never    Smokeless tobacco: Never   Vaping Use    Vaping status: Never Used   Substance and Sexual Activity    Alcohol use: Never    Drug use: Never    Sexual activity: Yes     Partners: Female     Birth control/protection: None, Natural family planning/Rhythm      Current Outpatient Medications on File Prior to Visit   Medication Sig Dispense Refill    aspirin 81 MG tablet Take 1 tablet by mouth Daily.      azelastine (ASTELIN) 0.1 % nasal spray Administer 2 sprays into the nostril(s) as directed by provider 2 (Two) Times a Day. Use in each nostril as directed 30 mL 12    carvedilol (COREG) 12.5 MG tablet TAKE 1 TABLET BY MOUTH TWICE A DAY WITH MEALS 180 tablet 3    Cholecalciferol (VITAMIN D) 2000 UNITS capsule Take 1 capsule by mouth Daily.      fluticasone (FLONASE) 50 MCG/ACT nasal spray Administer 2 sprays into the nostril(s) as directed by provider Daily. 16 g 0    meloxicam (MOBIC) 15 MG tablet Take 1 tablet by mouth Daily As Needed for Mild Pain. 90 tablet 3    pantoprazole (PROTONIX) 40 MG EC tablet Take 1 tablet by mouth Daily. 90 tablet 1    prasugrel (EFFIENT) 10 MG tablet Take 1 tablet by mouth Daily. 90 tablet 3    rosuvastatin (CRESTOR) 40 MG tablet Take 1 tablet by mouth Daily. 90 tablet 3    Semaglutide, 2 MG/DOSE, (Ozempic, 2 MG/DOSE,) 8 MG/3ML solution pen-injector Inject 2 mg under the skin into the appropriate area as directed 1 (One) Time Per Week. 3 mL 0    tamsulosin (FLOMAX) 0.4 MG capsule 24 hr capsule Take 1 capsule by mouth Daily. 90 capsule 3    telmisartan-hydrochlorothiazide (Micardis HCT) 80-25 MG per tablet Take 1 tablet by mouth Daily. 90 tablet 3    Testosterone 20.25 MG/ACT (1.62%) gel Apply three pumps to upper arms  and shoulders daily 150 g 0     No current facility-administered medications on file prior to visit.      Allergies   Allergen Reactions    Atorvastatin Myalgia        Review of Systems   Constitutional:  Negative for activity change, appetite change, chills, diaphoresis, fatigue, fever and unexpected weight change.   HENT:  Negative for congestion, dental problem, drooling, ear discharge, ear pain, facial swelling, hearing loss, mouth sores, nosebleeds, postnasal drip, rhinorrhea, sinus pressure, sneezing, sore throat, tinnitus, trouble swallowing and voice change.    Eyes:  Negative for photophobia, pain, discharge, redness, itching and visual disturbance.   Respiratory:  Negative for apnea, cough, choking, chest tightness, shortness of breath, wheezing and stridor.    Cardiovascular:  Negative for chest pain, palpitations and leg swelling.   Gastrointestinal:  Negative for abdominal distention, abdominal pain, anal bleeding, blood in stool, constipation, diarrhea, nausea, rectal pain and vomiting.   Endocrine: Negative for cold intolerance, heat intolerance, polydipsia, polyphagia and polyuria.   Genitourinary:  Negative for decreased urine volume, difficulty urinating, dysuria, enuresis, flank pain, frequency, genital sores, hematuria and urgency.   Musculoskeletal:  Positive for arthralgias. Negative for back pain, gait problem, joint swelling, myalgias, neck pain and neck stiffness.   Skin:  Negative for color change, pallor, rash and wound.   Allergic/Immunologic: Negative for environmental allergies, food allergies and immunocompromised state.   Neurological:  Negative for dizziness, tremors, seizures, syncope, facial asymmetry, speech difficulty, weakness, light-headedness, numbness and headaches.   Hematological:  Negative for adenopathy. Does not bruise/bleed easily.   Psychiatric/Behavioral:  Negative for agitation, behavioral problems, confusion, decreased concentration, dysphoric mood, hallucinations,  "self-injury, sleep disturbance and suicidal ideas. The patient is not nervous/anxious and is not hyperactive.    All other systems reviewed and are negative.       Objective      Physical Exam  /70 (BP Location: Left arm, Patient Position: Sitting)   Ht 182.9 cm (72.01\")   Wt 114 kg (251 lb)   BMI 34.03 kg/m²     Body mass index is 34.03 kg/m².         General:   Mental Status:  Alert   Appearance: Cooperative, in no acute distress   Build and Nutrition: Overweight by BMI male   Orientation: Alert and oriented to person, place and time   Posture: Normal   Gait: Limp on the left    Integument:   Left knee: No skin lesions, no rash, no ecchymosis    Lower Extremities:   Left Knee:    Tenderness:  Medial joint line tenderness    Effusion:  None    Swelling:  None    Crepitus: Positive    Range of motion:  Extension: 0°       Flexion: 120°  Instability: No varus laxity, no valgus laxity, negative anterior drawer  Deformities:  Varus      Imaging/Studies  Imaging Results (Last 24 Hours)       ** No results found for the last 24 hours. **          No new imaging today.    Assessment and Plan     Diagnoses and all orders for this visit:    1. Primary osteoarthritis of left knee (Primary)  -     - Large Joint Arthrocentesis: L knee  -     Large Joint Arthrocentesis        1. Primary osteoarthritis of left knee          I reviewed my findings with the patient.  He would like a repeat injection for his left knee today, and this was provided.  I will see him back in 2 months, at which point he would like to try viscosupplementation injection series if appropriate.  We will go ahead and submit for approval at this time.  Ultimately he is a candidate for knee replacement surgery, but he is not keen on that currently.    Procedure Note:  The potential benefits of performing a therapeutic left knee joint injection, as well as potential risks (including, but not limited to infection, swelling, pain, bleeding, bruising, " nerve/blood vessel damage, skin color changes, transient elevation in blood glucose levels, and fat atrophy) were discussed with the patient.  After informed consent, timeout procedure was performed, and the skin on the left knee was prepped with chlorhexidine soap and alcohol, after which ethyl chloride was applied to the skin at the injection site. Via the anterolateral approach, 1ml of Kenalog 40mg/ml mixed with 4ml 0.5% ropivacaine plain was injected into the knee joint.  The patient tolerated the procedure well, experiencing 100% improvement a few minutes following the injection. There were no complications.  Band-Aid was applied to the injection site. Post-procedural instructions were given to the patient and/or their caregiver.      Return in about 2 months (around 9/7/2025) for after approval of visco injection(s).      Prince Castellanos MD  07/07/25  13:38 EDT    Dictated Utilizing [employer]

## 2025-07-14 DIAGNOSIS — E23.0 HYPOGONADOTROPIC HYPOGONADISM: ICD-10-CM

## 2025-07-14 RX ORDER — TESTOSTERONE 20.25 MG/1.25G
GEL TOPICAL
Qty: 150 G | Refills: 0 | OUTPATIENT
Start: 2025-07-14

## 2025-07-14 RX ORDER — PANTOPRAZOLE SODIUM 40 MG/1
40 TABLET, DELAYED RELEASE ORAL DAILY
Qty: 90 TABLET | Refills: 1 | OUTPATIENT
Start: 2025-07-14

## 2025-07-14 RX ORDER — TESTOSTERONE 1.62 MG/G
GEL TRANSDERMAL
Qty: 150 G | Refills: 0 | Status: SHIPPED | OUTPATIENT
Start: 2025-07-14

## 2025-07-14 RX ORDER — PANTOPRAZOLE SODIUM 40 MG/1
40 TABLET, DELAYED RELEASE ORAL DAILY
Qty: 90 TABLET | Refills: 0 | Status: SHIPPED | OUTPATIENT
Start: 2025-07-14

## 2025-07-14 NOTE — TELEPHONE ENCOUNTER
Rx Refill Note  Requested Prescriptions     Pending Prescriptions Disp Refills    pantoprazole (PROTONIX) 40 MG EC tablet 90 tablet 0     Sig: Take 1 tablet by mouth Daily.      Last office visit with prescribing clinician: 6/17/2025     Next office visit with prescribing clinician: 10/20/2025     Katey Viera MA  07/14/25, 14:32 EDT

## 2025-07-14 NOTE — TELEPHONE ENCOUNTER
Rx Refill Note  Requested Prescriptions     Pending Prescriptions Disp Refills    Testosterone 20.25 MG/ACT (1.62%) gel 150 g 0     Sig: Apply three pumps to upper arms and shoulders daily      Last office visit with prescribing clinician: 6/17/2025   Last telemedicine visit with prescribing clinician: Visit date not found   Next office visit with prescribing clinician: 10/20/2025                         Would you like a call back once the refill request has been completed: [] Yes [] No    If the office needs to give you a call back, can they leave a voicemail: [] Yes [] No    Danuta Cornejo MA  07/14/25, 12:06 EDT

## 2025-07-28 RX ORDER — SEMAGLUTIDE 2.68 MG/ML
INJECTION, SOLUTION SUBCUTANEOUS
Qty: 9 ML | Refills: 0 | Status: SHIPPED | OUTPATIENT
Start: 2025-07-28

## 2025-07-28 NOTE — TELEPHONE ENCOUNTER
Rx Refill Note  Requested Prescriptions     Pending Prescriptions Disp Refills    Ozempic, 2 MG/DOSE, 8 MG/3ML solution pen-injector [Pharmacy Med Name: OZEMPIC 2 MG/DOSE (8 MG/3 ML)] 3 mL 0     Sig: DIAL AND INJECT UNDER THE SKIN 2 MG WEEKLY      Last office visit with prescribing clinician: 6/17/2025     Next office visit with prescribing clinician: 10/20/2025

## 2025-08-05 ENCOUNTER — OFFICE VISIT (OUTPATIENT)
Age: 65
End: 2025-08-05
Payer: OTHER MISCELLANEOUS

## 2025-08-05 DIAGNOSIS — Z98.890 S/P RIGHT ROTATOR CUFF REPAIR: ICD-10-CM
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
## 2025-08-05 DIAGNOSIS — Z09 SURGERY FOLLOW-UP: Primary | ICD-10-CM

## 2025-08-05 PROCEDURE — 99024 POSTOP FOLLOW-UP VISIT: CPT

## (undated) DEVICE — TBG PENCL TELESCP MEGADYNE SMOKE EVAC 10FT

## (undated) DEVICE — STRYKER PERFORMANCE SERIES SAGITTAL BLADE: Brand: STRYKER PERFORMANCE SERIES

## (undated) DEVICE — KT PUMP INFUBLOCK MDL 2100 PMKITSOLIS

## (undated) DEVICE — PATIENT RETURN ELECTRODE, SINGLE-USE, CONTACT QUALITY MONITORING, ADULT, WITH 9FT CORD, FOR PATIENTS WEIGING OVER 33LBS. (15KG): Brand: MEGADYNE

## (undated) DEVICE — ANTIBACTERIAL UNDYED BRAIDED (POLYGLACTIN 910), SYNTHETIC ABSORBABLE SUTURE: Brand: COATED VICRYL

## (undated) DEVICE — Device

## (undated) DEVICE — SHEET,DRAPE,40X58,STERILE: Brand: MEDLINE

## (undated) DEVICE — BLANKT WARM UPPR/BDY ARM/OUT 57X196CM

## (undated) DEVICE — SYS CLS SKIN PREMIERPRO EXOFINFUSION 22CM

## (undated) DEVICE — DRSNG PAD ABD 8X10IN STRL

## (undated) DEVICE — BNDG ELAS W/CLIP 6IN 10YD LF STRL

## (undated) DEVICE — DRAPE,TOP,102X53,STERILE: Brand: MEDLINE

## (undated) DEVICE — SUT MONOCRYL PLS ANTIB UND 3/0  PS1 27IN

## (undated) DEVICE — PAD ARMBRD SURG CONVOL 7.5X20X2IN

## (undated) DEVICE — GLV SURG SENSICARE W/ALOE PF LF 9 STRL

## (undated) DEVICE — TRAP FLD MINIVAC MEGADYNE 100ML

## (undated) DEVICE — ADHS SKIN PREMIERPRO EXOFIN TOPICAL HI/VISC .5ML

## (undated) DEVICE — CVR FTSWITCH UNIV

## (undated) DEVICE — UNDERCAST PADDING: Brand: DEROYAL

## (undated) DEVICE — PK KN TOTL 10

## (undated) DEVICE — GLV SURG PREMIERPRO MIC LTX PF SZ8.5 BRN

## (undated) DEVICE — CEMENT MIXING SYSTEM WITH MIS FEMORAL BREAKAWAY NOZZLE: Brand: REVOLUTION

## (undated) DEVICE — NDL HYPO ECLPS SFTY 18G 1 1/2IN